# Patient Record
Sex: FEMALE | Race: WHITE | Employment: OTHER | URBAN - METROPOLITAN AREA
[De-identification: names, ages, dates, MRNs, and addresses within clinical notes are randomized per-mention and may not be internally consistent; named-entity substitution may affect disease eponyms.]

---

## 2017-01-06 RX ORDER — FLUTICASONE PROPIONATE 50 MCG
1 SPRAY, SUSPENSION (ML) NASAL DAILY
Qty: 3 BOTTLE | Refills: 3 | Status: SHIPPED | OUTPATIENT
Start: 2017-01-06 | End: 2017-12-17 | Stop reason: SDUPTHER

## 2017-02-13 ENCOUNTER — OFFICE VISIT (OUTPATIENT)
Dept: INTERNAL MEDICINE CLINIC | Age: 77
End: 2017-02-13

## 2017-02-13 VITALS
TEMPERATURE: 97.8 F | DIASTOLIC BLOOD PRESSURE: 78 MMHG | WEIGHT: 222 LBS | BODY MASS INDEX: 40.6 KG/M2 | SYSTOLIC BLOOD PRESSURE: 130 MMHG | HEART RATE: 86 BPM

## 2017-02-13 DIAGNOSIS — N30.01 ACUTE CYSTITIS WITH HEMATURIA: Primary | ICD-10-CM

## 2017-02-13 DIAGNOSIS — J00 ACUTE NASOPHARYNGITIS: ICD-10-CM

## 2017-02-13 DIAGNOSIS — R30.0 DYSURIA: ICD-10-CM

## 2017-02-13 LAB
BILIRUBIN, POC: ABNORMAL
BLOOD URINE, POC: ABNORMAL
CLARITY, POC: ABNORMAL
COLOR, POC: ABNORMAL
GLUCOSE URINE, POC: ABNORMAL
KETONES, POC: ABNORMAL
LEUKOCYTE EST, POC: ABNORMAL
NITRITE, POC: ABNORMAL
PH, POC: 6
PROTEIN, POC: ABNORMAL
SPECIFIC GRAVITY, POC: 1.02
UROBILINOGEN, POC: 0.2

## 2017-02-13 PROCEDURE — 99213 OFFICE O/P EST LOW 20 MIN: CPT | Performed by: NURSE PRACTITIONER

## 2017-02-13 PROCEDURE — 81002 URINALYSIS NONAUTO W/O SCOPE: CPT | Performed by: NURSE PRACTITIONER

## 2017-02-13 PROCEDURE — G8484 FLU IMMUNIZE NO ADMIN: HCPCS | Performed by: NURSE PRACTITIONER

## 2017-02-13 PROCEDURE — 3017F COLORECTAL CA SCREEN DOC REV: CPT | Performed by: NURSE PRACTITIONER

## 2017-02-13 PROCEDURE — 3014F SCREEN MAMMO DOC REV: CPT | Performed by: NURSE PRACTITIONER

## 2017-02-13 PROCEDURE — 1123F ACP DISCUSS/DSCN MKR DOCD: CPT | Performed by: NURSE PRACTITIONER

## 2017-02-13 PROCEDURE — 1036F TOBACCO NON-USER: CPT | Performed by: NURSE PRACTITIONER

## 2017-02-13 PROCEDURE — 4040F PNEUMOC VAC/ADMIN/RCVD: CPT | Performed by: NURSE PRACTITIONER

## 2017-02-13 PROCEDURE — 1090F PRES/ABSN URINE INCON ASSESS: CPT | Performed by: NURSE PRACTITIONER

## 2017-02-13 PROCEDURE — G8417 CALC BMI ABV UP PARAM F/U: HCPCS | Performed by: NURSE PRACTITIONER

## 2017-02-13 PROCEDURE — G8427 DOCREV CUR MEDS BY ELIG CLIN: HCPCS | Performed by: NURSE PRACTITIONER

## 2017-02-13 PROCEDURE — G8399 PT W/DXA RESULTS DOCUMENT: HCPCS | Performed by: NURSE PRACTITIONER

## 2017-02-13 RX ORDER — CIPROFLOXACIN 500 MG/1
500 TABLET, FILM COATED ORAL 2 TIMES DAILY
Qty: 10 TABLET | Refills: 0 | Status: SHIPPED | OUTPATIENT
Start: 2017-02-13 | End: 2017-02-18

## 2017-02-13 ASSESSMENT — ENCOUNTER SYMPTOMS
NAUSEA: 0
SINUS PRESSURE: 1
DIARRHEA: 0
CONSTIPATION: 0
TROUBLE SWALLOWING: 0
SORE THROAT: 0
EYE ITCHING: 0
RECTAL PAIN: 0
WHEEZING: 0
PHOTOPHOBIA: 0
CHEST TIGHTNESS: 0
FACIAL SWELLING: 0
CHOKING: 0
EYE PAIN: 0
ABDOMINAL DISTENTION: 0
ABDOMINAL PAIN: 0
ALLERGIC/IMMUNOLOGIC NEGATIVE: 1
VOMITING: 0
RHINORRHEA: 1
COUGH: 1
STRIDOR: 0
ANAL BLEEDING: 0
EYE REDNESS: 0
BLOOD IN STOOL: 0
VOICE CHANGE: 0
EYE DISCHARGE: 0
SHORTNESS OF BREATH: 1

## 2017-02-15 LAB
ORGANISM: ABNORMAL
URINE CULTURE, ROUTINE: ABNORMAL

## 2017-03-02 PROBLEM — R07.9 CHEST PAIN: Status: ACTIVE | Noted: 2017-03-02

## 2017-03-03 ENCOUNTER — TELEPHONE (OUTPATIENT)
Dept: ORTHOPEDIC SURGERY | Age: 77
End: 2017-03-03

## 2017-03-04 ENCOUNTER — CARE COORDINATION (OUTPATIENT)
Dept: CASE MANAGEMENT | Age: 77
End: 2017-03-04

## 2017-03-07 ENCOUNTER — CARE COORDINATION (OUTPATIENT)
Dept: CASE MANAGEMENT | Age: 77
End: 2017-03-07

## 2017-03-09 ENCOUNTER — CARE COORDINATOR VISIT (OUTPATIENT)
Dept: INTERNAL MEDICINE CLINIC | Age: 77
End: 2017-03-09

## 2017-03-09 ENCOUNTER — OFFICE VISIT (OUTPATIENT)
Dept: INTERNAL MEDICINE CLINIC | Age: 77
End: 2017-03-09

## 2017-03-09 VITALS
SYSTOLIC BLOOD PRESSURE: 136 MMHG | BODY MASS INDEX: 39.86 KG/M2 | WEIGHT: 216.6 LBS | HEART RATE: 72 BPM | HEIGHT: 62 IN | DIASTOLIC BLOOD PRESSURE: 76 MMHG

## 2017-03-09 DIAGNOSIS — J44.9 COPD, SEVERE (HCC): ICD-10-CM

## 2017-03-09 DIAGNOSIS — M19.012 ARTHRITIS OF LEFT SHOULDER REGION: ICD-10-CM

## 2017-03-09 DIAGNOSIS — R07.9 CHEST PAIN, UNSPECIFIED TYPE: ICD-10-CM

## 2017-03-09 DIAGNOSIS — I10 ESSENTIAL HYPERTENSION: Chronic | ICD-10-CM

## 2017-03-09 DIAGNOSIS — Z09 HOSPITAL DISCHARGE FOLLOW-UP: Primary | ICD-10-CM

## 2017-03-09 RX ORDER — TRAMADOL HYDROCHLORIDE 50 MG/1
50 TABLET ORAL EVERY 6 HOURS PRN
Qty: 120 TABLET | Refills: 0 | Status: SHIPPED | OUTPATIENT
Start: 2017-03-09 | End: 2017-05-09 | Stop reason: SDUPTHER

## 2017-03-09 RX ORDER — CELECOXIB 200 MG/1
200 CAPSULE ORAL DAILY
Qty: 90 CAPSULE | Refills: 3 | Status: ON HOLD | OUTPATIENT
Start: 2017-03-09 | End: 2017-12-29 | Stop reason: HOSPADM

## 2017-04-01 PROCEDURE — 99496 TRANSJ CARE MGMT HIGH F2F 7D: CPT | Performed by: NURSE PRACTITIONER

## 2017-04-13 ENCOUNTER — CARE COORDINATION (OUTPATIENT)
Dept: INTERNAL MEDICINE CLINIC | Age: 77
End: 2017-04-13

## 2017-05-09 ENCOUNTER — CARE COORDINATOR VISIT (OUTPATIENT)
Dept: INTERNAL MEDICINE CLINIC | Age: 77
End: 2017-05-09

## 2017-05-09 ENCOUNTER — OFFICE VISIT (OUTPATIENT)
Dept: INTERNAL MEDICINE CLINIC | Age: 77
End: 2017-05-09

## 2017-05-09 VITALS
HEART RATE: 80 BPM | SYSTOLIC BLOOD PRESSURE: 160 MMHG | BODY MASS INDEX: 39.32 KG/M2 | WEIGHT: 215 LBS | DIASTOLIC BLOOD PRESSURE: 80 MMHG

## 2017-05-09 DIAGNOSIS — M19.012 ARTHRITIS OF LEFT SHOULDER REGION: ICD-10-CM

## 2017-05-09 DIAGNOSIS — R73.9 HYPERGLYCEMIA: Primary | ICD-10-CM

## 2017-05-09 PROCEDURE — 3014F SCREEN MAMMO DOC REV: CPT | Performed by: INTERNAL MEDICINE

## 2017-05-09 PROCEDURE — 99214 OFFICE O/P EST MOD 30 MIN: CPT | Performed by: INTERNAL MEDICINE

## 2017-05-09 PROCEDURE — G8427 DOCREV CUR MEDS BY ELIG CLIN: HCPCS | Performed by: INTERNAL MEDICINE

## 2017-05-09 PROCEDURE — G8399 PT W/DXA RESULTS DOCUMENT: HCPCS | Performed by: INTERNAL MEDICINE

## 2017-05-09 PROCEDURE — 1123F ACP DISCUSS/DSCN MKR DOCD: CPT | Performed by: INTERNAL MEDICINE

## 2017-05-09 PROCEDURE — 1036F TOBACCO NON-USER: CPT | Performed by: INTERNAL MEDICINE

## 2017-05-09 PROCEDURE — G8417 CALC BMI ABV UP PARAM F/U: HCPCS | Performed by: INTERNAL MEDICINE

## 2017-05-09 PROCEDURE — 1090F PRES/ABSN URINE INCON ASSESS: CPT | Performed by: INTERNAL MEDICINE

## 2017-05-09 PROCEDURE — 3017F COLORECTAL CA SCREEN DOC REV: CPT | Performed by: INTERNAL MEDICINE

## 2017-05-09 PROCEDURE — 4040F PNEUMOC VAC/ADMIN/RCVD: CPT | Performed by: INTERNAL MEDICINE

## 2017-05-09 RX ORDER — ALBUTEROL SULFATE 90 UG/1
2 AEROSOL, METERED RESPIRATORY (INHALATION) EVERY 6 HOURS PRN
Qty: 1 INHALER | Refills: 3 | Status: SHIPPED | OUTPATIENT
Start: 2017-05-09 | End: 2017-07-18 | Stop reason: SDUPTHER

## 2017-05-09 RX ORDER — TRAMADOL HYDROCHLORIDE 50 MG/1
50 TABLET ORAL EVERY 6 HOURS PRN
Qty: 120 TABLET | Refills: 0 | Status: SHIPPED | OUTPATIENT
Start: 2017-05-09 | End: 2017-07-25 | Stop reason: SDUPTHER

## 2017-05-09 ASSESSMENT — PATIENT HEALTH QUESTIONNAIRE - PHQ9
SUM OF ALL RESPONSES TO PHQ QUESTIONS 1-9: 1
SUM OF ALL RESPONSES TO PHQ9 QUESTIONS 1 & 2: 1
2. FEELING DOWN, DEPRESSED OR HOPELESS: 0
1. LITTLE INTEREST OR PLEASURE IN DOING THINGS: 1

## 2017-05-10 LAB
ESTIMATED AVERAGE GLUCOSE: 105.4 MG/DL
HBA1C MFR BLD: 5.3 %

## 2017-06-07 ENCOUNTER — CARE COORDINATION (OUTPATIENT)
Dept: INTERNAL MEDICINE CLINIC | Age: 77
End: 2017-06-07

## 2017-07-12 ENCOUNTER — CARE COORDINATION (OUTPATIENT)
Dept: INTERNAL MEDICINE CLINIC | Age: 77
End: 2017-07-12

## 2017-07-18 ENCOUNTER — OFFICE VISIT (OUTPATIENT)
Dept: PULMONOLOGY | Age: 77
End: 2017-07-18

## 2017-07-18 VITALS
WEIGHT: 217 LBS | DIASTOLIC BLOOD PRESSURE: 86 MMHG | SYSTOLIC BLOOD PRESSURE: 129 MMHG | HEART RATE: 82 BPM | BODY MASS INDEX: 39.69 KG/M2

## 2017-07-18 DIAGNOSIS — G47.33 OSA ON CPAP: Chronic | ICD-10-CM

## 2017-07-18 DIAGNOSIS — J96.11 CHRONIC RESPIRATORY FAILURE WITH HYPOXIA (HCC): ICD-10-CM

## 2017-07-18 DIAGNOSIS — Z99.89 OSA ON CPAP: Chronic | ICD-10-CM

## 2017-07-18 DIAGNOSIS — I10 ESSENTIAL HYPERTENSION: Chronic | ICD-10-CM

## 2017-07-18 DIAGNOSIS — J44.9 COPD, SEVERE (HCC): Primary | ICD-10-CM

## 2017-07-18 PROCEDURE — 3014F SCREEN MAMMO DOC REV: CPT | Performed by: INTERNAL MEDICINE

## 2017-07-18 PROCEDURE — 99214 OFFICE O/P EST MOD 30 MIN: CPT | Performed by: INTERNAL MEDICINE

## 2017-07-18 PROCEDURE — 3023F SPIROM DOC REV: CPT | Performed by: INTERNAL MEDICINE

## 2017-07-18 PROCEDURE — 4040F PNEUMOC VAC/ADMIN/RCVD: CPT | Performed by: INTERNAL MEDICINE

## 2017-07-18 PROCEDURE — 1090F PRES/ABSN URINE INCON ASSESS: CPT | Performed by: INTERNAL MEDICINE

## 2017-07-18 PROCEDURE — 1036F TOBACCO NON-USER: CPT | Performed by: INTERNAL MEDICINE

## 2017-07-18 PROCEDURE — 3017F COLORECTAL CA SCREEN DOC REV: CPT | Performed by: INTERNAL MEDICINE

## 2017-07-18 PROCEDURE — G8427 DOCREV CUR MEDS BY ELIG CLIN: HCPCS | Performed by: INTERNAL MEDICINE

## 2017-07-18 PROCEDURE — 1123F ACP DISCUSS/DSCN MKR DOCD: CPT | Performed by: INTERNAL MEDICINE

## 2017-07-18 PROCEDURE — G8926 SPIRO NO PERF OR DOC: HCPCS | Performed by: INTERNAL MEDICINE

## 2017-07-18 PROCEDURE — G8399 PT W/DXA RESULTS DOCUMENT: HCPCS | Performed by: INTERNAL MEDICINE

## 2017-07-18 PROCEDURE — G8417 CALC BMI ABV UP PARAM F/U: HCPCS | Performed by: INTERNAL MEDICINE

## 2017-07-18 RX ORDER — ALBUTEROL SULFATE 90 UG/1
2 AEROSOL, METERED RESPIRATORY (INHALATION) EVERY 6 HOURS PRN
Qty: 1 INHALER | Refills: 3 | Status: SHIPPED | OUTPATIENT
Start: 2017-07-18

## 2017-07-25 ENCOUNTER — TELEPHONE (OUTPATIENT)
Dept: RHEUMATOLOGY | Age: 77
End: 2017-07-25

## 2017-07-25 DIAGNOSIS — M19.012 ARTHRITIS OF LEFT SHOULDER REGION: ICD-10-CM

## 2017-07-25 RX ORDER — TRAMADOL HYDROCHLORIDE 50 MG/1
50 TABLET ORAL EVERY 6 HOURS PRN
Qty: 120 TABLET | Refills: 0 | Status: SHIPPED | OUTPATIENT
Start: 2017-07-25 | End: 2017-09-27 | Stop reason: SDUPTHER

## 2017-08-02 ENCOUNTER — OFFICE VISIT (OUTPATIENT)
Dept: INTERNAL MEDICINE CLINIC | Age: 77
End: 2017-08-02

## 2017-08-02 VITALS
BODY MASS INDEX: 39.93 KG/M2 | WEIGHT: 217 LBS | HEIGHT: 62 IN | HEART RATE: 72 BPM | SYSTOLIC BLOOD PRESSURE: 160 MMHG | DIASTOLIC BLOOD PRESSURE: 80 MMHG

## 2017-08-02 DIAGNOSIS — B37.0 THRUSH, ORAL: Primary | ICD-10-CM

## 2017-08-02 PROCEDURE — G8428 CUR MEDS NOT DOCUMENT: HCPCS | Performed by: INTERNAL MEDICINE

## 2017-08-02 PROCEDURE — 1036F TOBACCO NON-USER: CPT | Performed by: INTERNAL MEDICINE

## 2017-08-02 PROCEDURE — 1123F ACP DISCUSS/DSCN MKR DOCD: CPT | Performed by: INTERNAL MEDICINE

## 2017-08-02 PROCEDURE — G8417 CALC BMI ABV UP PARAM F/U: HCPCS | Performed by: INTERNAL MEDICINE

## 2017-08-02 PROCEDURE — 4040F PNEUMOC VAC/ADMIN/RCVD: CPT | Performed by: INTERNAL MEDICINE

## 2017-08-02 PROCEDURE — 3017F COLORECTAL CA SCREEN DOC REV: CPT | Performed by: INTERNAL MEDICINE

## 2017-08-02 PROCEDURE — G8399 PT W/DXA RESULTS DOCUMENT: HCPCS | Performed by: INTERNAL MEDICINE

## 2017-08-02 PROCEDURE — 1090F PRES/ABSN URINE INCON ASSESS: CPT | Performed by: INTERNAL MEDICINE

## 2017-08-02 PROCEDURE — 99213 OFFICE O/P EST LOW 20 MIN: CPT | Performed by: INTERNAL MEDICINE

## 2017-08-24 ENCOUNTER — TELEPHONE (OUTPATIENT)
Dept: INTERNAL MEDICINE CLINIC | Age: 77
End: 2017-08-24

## 2017-08-24 DIAGNOSIS — D64.9 ANEMIA, UNSPECIFIED TYPE: ICD-10-CM

## 2017-08-24 DIAGNOSIS — E03.4 HYPOTHYROIDISM DUE TO ACQUIRED ATROPHY OF THYROID: Primary | ICD-10-CM

## 2017-08-24 DIAGNOSIS — R73.9 HYPERGLYCEMIA: ICD-10-CM

## 2017-08-25 LAB
IRON SATURATION: 24 % (ref 15–50)
IRON: 83 UG/DL (ref 37–145)
TOTAL IRON BINDING CAPACITY: 353 UG/DL (ref 260–445)
TSH REFLEX FT4: 1.5 UIU/ML (ref 0.27–4.2)

## 2017-08-26 LAB
ESTIMATED AVERAGE GLUCOSE: 108.3 MG/DL
HBA1C MFR BLD: 5.4 %

## 2017-08-30 ENCOUNTER — CARE COORDINATION (OUTPATIENT)
Dept: INTERNAL MEDICINE CLINIC | Age: 77
End: 2017-08-30

## 2017-08-31 ENCOUNTER — CARE COORDINATION (OUTPATIENT)
Dept: INTERNAL MEDICINE CLINIC | Age: 77
End: 2017-08-31

## 2017-09-21 RX ORDER — AMLODIPINE BESYLATE 10 MG/1
TABLET ORAL
Qty: 90 TABLET | Refills: 3 | Status: ON HOLD | OUTPATIENT
Start: 2017-09-21 | End: 2017-12-29 | Stop reason: HOSPADM

## 2017-09-27 ENCOUNTER — OFFICE VISIT (OUTPATIENT)
Dept: INTERNAL MEDICINE CLINIC | Age: 77
End: 2017-09-27

## 2017-09-27 VITALS
HEART RATE: 72 BPM | SYSTOLIC BLOOD PRESSURE: 122 MMHG | DIASTOLIC BLOOD PRESSURE: 60 MMHG | BODY MASS INDEX: 39.32 KG/M2 | TEMPERATURE: 98.2 F | WEIGHT: 215 LBS

## 2017-09-27 DIAGNOSIS — R11.2 NON-INTRACTABLE VOMITING WITH NAUSEA, UNSPECIFIED VOMITING TYPE: Primary | ICD-10-CM

## 2017-09-27 DIAGNOSIS — R11.2 NON-INTRACTABLE VOMITING WITH NAUSEA, UNSPECIFIED VOMITING TYPE: ICD-10-CM

## 2017-09-27 DIAGNOSIS — M19.012 ARTHRITIS OF LEFT SHOULDER REGION: ICD-10-CM

## 2017-09-27 LAB
A/G RATIO: 1.7 (ref 1.1–2.2)
ALBUMIN SERPL-MCNC: 4.3 G/DL (ref 3.4–5)
ALP BLD-CCNC: 116 U/L (ref 40–129)
ALT SERPL-CCNC: 14 U/L (ref 10–40)
AMYLASE: 61 U/L (ref 25–115)
ANION GAP SERPL CALCULATED.3IONS-SCNC: 15 MMOL/L (ref 3–16)
AST SERPL-CCNC: 15 U/L (ref 15–37)
BASOPHILS ABSOLUTE: 0.1 K/UL (ref 0–0.2)
BASOPHILS RELATIVE PERCENT: 0.6 %
BILIRUB SERPL-MCNC: 0.4 MG/DL (ref 0–1)
BUN BLDV-MCNC: 15 MG/DL (ref 7–20)
CALCIUM SERPL-MCNC: 9.7 MG/DL (ref 8.3–10.6)
CHLORIDE BLD-SCNC: 103 MMOL/L (ref 99–110)
CO2: 26 MMOL/L (ref 21–32)
CREAT SERPL-MCNC: 0.6 MG/DL (ref 0.6–1.2)
EOSINOPHILS ABSOLUTE: 0.3 K/UL (ref 0–0.6)
EOSINOPHILS RELATIVE PERCENT: 3.3 %
GFR AFRICAN AMERICAN: >60
GFR NON-AFRICAN AMERICAN: >60
GLOBULIN: 2.6 G/DL
GLUCOSE BLD-MCNC: 102 MG/DL (ref 70–99)
HCT VFR BLD CALC: 45 % (ref 36–48)
HEMOGLOBIN: 15.1 G/DL (ref 12–16)
LYMPHOCYTES ABSOLUTE: 2.1 K/UL (ref 1–5.1)
LYMPHOCYTES RELATIVE PERCENT: 22 %
MCH RBC QN AUTO: 31.3 PG (ref 26–34)
MCHC RBC AUTO-ENTMCNC: 33.5 G/DL (ref 31–36)
MCV RBC AUTO: 93.3 FL (ref 80–100)
MONOCYTES ABSOLUTE: 0.5 K/UL (ref 0–1.3)
MONOCYTES RELATIVE PERCENT: 5.3 %
NEUTROPHILS ABSOLUTE: 6.6 K/UL (ref 1.7–7.7)
NEUTROPHILS RELATIVE PERCENT: 68.8 %
PDW BLD-RTO: 14.8 % (ref 12.4–15.4)
PLATELET # BLD: 240 K/UL (ref 135–450)
PMV BLD AUTO: 9.3 FL (ref 5–10.5)
POTASSIUM SERPL-SCNC: 4.9 MMOL/L (ref 3.5–5.1)
RBC # BLD: 4.82 M/UL (ref 4–5.2)
SODIUM BLD-SCNC: 144 MMOL/L (ref 136–145)
TOTAL PROTEIN: 6.9 G/DL (ref 6.4–8.2)
WBC # BLD: 9.5 K/UL (ref 4–11)

## 2017-09-27 PROCEDURE — G8417 CALC BMI ABV UP PARAM F/U: HCPCS | Performed by: INTERNAL MEDICINE

## 2017-09-27 PROCEDURE — G8399 PT W/DXA RESULTS DOCUMENT: HCPCS | Performed by: INTERNAL MEDICINE

## 2017-09-27 PROCEDURE — 1036F TOBACCO NON-USER: CPT | Performed by: INTERNAL MEDICINE

## 2017-09-27 PROCEDURE — 99213 OFFICE O/P EST LOW 20 MIN: CPT | Performed by: INTERNAL MEDICINE

## 2017-09-27 PROCEDURE — 1090F PRES/ABSN URINE INCON ASSESS: CPT | Performed by: INTERNAL MEDICINE

## 2017-09-27 PROCEDURE — 1123F ACP DISCUSS/DSCN MKR DOCD: CPT | Performed by: INTERNAL MEDICINE

## 2017-09-27 PROCEDURE — G8427 DOCREV CUR MEDS BY ELIG CLIN: HCPCS | Performed by: INTERNAL MEDICINE

## 2017-09-27 PROCEDURE — 3017F COLORECTAL CA SCREEN DOC REV: CPT | Performed by: INTERNAL MEDICINE

## 2017-09-27 PROCEDURE — 4040F PNEUMOC VAC/ADMIN/RCVD: CPT | Performed by: INTERNAL MEDICINE

## 2017-09-27 RX ORDER — TRAMADOL HYDROCHLORIDE 50 MG/1
50 TABLET ORAL EVERY 6 HOURS PRN
Qty: 120 TABLET | Refills: 0 | Status: SHIPPED | OUTPATIENT
Start: 2017-09-27 | End: 2017-12-19 | Stop reason: SDUPTHER

## 2017-09-27 RX ORDER — ONDANSETRON 4 MG/1
4 TABLET, FILM COATED ORAL EVERY 8 HOURS PRN
Qty: 12 TABLET | Refills: 0 | Status: SHIPPED | OUTPATIENT
Start: 2017-09-27 | End: 2017-12-22 | Stop reason: SDUPTHER

## 2017-10-19 ENCOUNTER — CARE COORDINATION (OUTPATIENT)
Dept: INTERNAL MEDICINE CLINIC | Age: 77
End: 2017-10-19

## 2017-10-20 ENCOUNTER — CARE COORDINATION (OUTPATIENT)
Dept: INTERNAL MEDICINE CLINIC | Age: 77
End: 2017-10-20

## 2017-10-20 NOTE — CARE COORDINATION
12/22/2017 11:00 AM [facility] MAMMO  1 [facility] MAMMO [facility]   3/6/2018 10:45 AM Julio Longoria MD PULM & CC [facility]

## 2017-10-26 ENCOUNTER — CARE COORDINATION (OUTPATIENT)
Dept: INTERNAL MEDICINE CLINIC | Age: 77
End: 2017-10-26

## 2017-10-26 ENCOUNTER — OFFICE VISIT (OUTPATIENT)
Dept: INTERNAL MEDICINE CLINIC | Age: 77
End: 2017-10-26

## 2017-10-26 VITALS
BODY MASS INDEX: 40.42 KG/M2 | HEART RATE: 72 BPM | SYSTOLIC BLOOD PRESSURE: 120 MMHG | DIASTOLIC BLOOD PRESSURE: 70 MMHG | WEIGHT: 221 LBS

## 2017-10-26 DIAGNOSIS — M65.311 TRIGGER FINGER OF RIGHT THUMB: ICD-10-CM

## 2017-10-26 DIAGNOSIS — M19.90 ARTHRITIS: Primary | ICD-10-CM

## 2017-10-26 PROCEDURE — G8417 CALC BMI ABV UP PARAM F/U: HCPCS | Performed by: INTERNAL MEDICINE

## 2017-10-26 PROCEDURE — 1036F TOBACCO NON-USER: CPT | Performed by: INTERNAL MEDICINE

## 2017-10-26 PROCEDURE — 99213 OFFICE O/P EST LOW 20 MIN: CPT | Performed by: INTERNAL MEDICINE

## 2017-10-26 PROCEDURE — 3017F COLORECTAL CA SCREEN DOC REV: CPT | Performed by: INTERNAL MEDICINE

## 2017-10-26 PROCEDURE — 4040F PNEUMOC VAC/ADMIN/RCVD: CPT | Performed by: INTERNAL MEDICINE

## 2017-10-26 PROCEDURE — G8427 DOCREV CUR MEDS BY ELIG CLIN: HCPCS | Performed by: INTERNAL MEDICINE

## 2017-10-26 PROCEDURE — G8484 FLU IMMUNIZE NO ADMIN: HCPCS | Performed by: INTERNAL MEDICINE

## 2017-10-26 PROCEDURE — 1123F ACP DISCUSS/DSCN MKR DOCD: CPT | Performed by: INTERNAL MEDICINE

## 2017-10-26 PROCEDURE — G8399 PT W/DXA RESULTS DOCUMENT: HCPCS | Performed by: INTERNAL MEDICINE

## 2017-10-26 PROCEDURE — 1090F PRES/ABSN URINE INCON ASSESS: CPT | Performed by: INTERNAL MEDICINE

## 2017-10-26 NOTE — PROGRESS NOTES
The patient is here for an acute appointment    She comes in with pain in her hands    She has symptoms of a trigger finger right thumb    She also has diffuse arthralgias in the DIP and PIP joints associated with Heberden's and Parish's nodes    There was mention of some concern regarding rheumatoid arthritis however after discussion she felt comfortable with the diagnosis of osteoarthritis given the classical findings    She is having some diffuse abdominal pain. She reduced her dose of Celebrex because of the pain. She is uncertain whether it may represent diverticular disease gastritis or ulcers.     Physical examination  Trigger finger present  Heberden's and Parish's nodes  Abdomen no guarding or rebound tenderness bowel sounds are normal  She is not tender in the left lower quadrant  There is no specific tenderness in the epigastric region    Impression and plan  Surgical referral for trigger finger  She may have acetaminophen in addition to the tramadol and Celebrex she is taking for pain  Certainly is possible that abdominal pain may be related to Celebrex but no symptoms of gastrointestinal bleeding, she does not have a surgical abdomen and symptoms and physical findings are not consistent with acute diverticulitis

## 2017-11-08 ENCOUNTER — HOSPITAL ENCOUNTER (OUTPATIENT)
Dept: ENDOSCOPY | Age: 77
Discharge: HOME | End: 2017-11-08
Attending: INTERNAL MEDICINE | Admitting: INTERNAL MEDICINE

## 2017-11-08 LAB
GLUCOSE BLD-MCNC: 91 MG/DL (ref 70–99)
GLUCOSE BLD-MCNC: 93 MG/DL (ref 70–99)
PERFORMED ON: NORMAL
PERFORMED ON: NORMAL

## 2017-11-08 RX ORDER — SODIUM CHLORIDE 0.9 % (FLUSH) 0.9 %
10 SYRINGE (ML) INJECTION EVERY 12 HOURS SCHEDULED
Status: CANCELLED | OUTPATIENT
Start: 2017-11-08

## 2017-11-08 RX ORDER — SODIUM CHLORIDE 9 MG/ML
INJECTION, SOLUTION INTRAVENOUS CONTINUOUS
Status: CANCELLED | OUTPATIENT
Start: 2017-11-08

## 2017-11-08 RX ORDER — SODIUM CHLORIDE 0.9 % (FLUSH) 0.9 %
10 SYRINGE (ML) INJECTION PRN
Status: CANCELLED | OUTPATIENT
Start: 2017-11-08

## 2017-11-08 ASSESSMENT — COPD QUESTIONNAIRES: CAT_SEVERITY: SEVERE

## 2017-11-08 ASSESSMENT — ENCOUNTER SYMPTOMS: SHORTNESS OF BREATH: 1

## 2017-11-08 NOTE — ANESTHESIA POST-OP
Anesthesia Post-op Note    Patient: Antonella Terrazas  MRN: [id number]  YOB: 1940  Date of evaluation: 11/8/2017  Time:  1:25 PM     Procedure(s) Performed:     Last Vitals: There were no vitals taken for this visit. Marielle Phase I:      Marielle Phase II:      Anesthesia Post Evaluation    Final anesthesia type: TIVA  Patient location during evaluation: PACU  Patient participation: complete - patient participated  Level of consciousness: awake and alert  Airway patency: patent  Nausea & Vomiting: no vomiting  Complications: no  Cardiovascular status: hemodynamically stable  Respiratory status: acceptable  Hydration status: euvolemic  Comments:   Patient vomited during colonoscopy bilious fluid. Patient appeared to protect her airway. No signs or symptoms of aspiration. Patient observed in recovery room. Patient is a former nurse and verbalizes understanding to report to ER in the unlikely case she develops wheezing or SOB. She agrees with plan.  Lungs CTA b/l VSS        Emil Arechiga MD  1:25 PM

## 2017-11-08 NOTE — ANESTHESIA PRE-OP
[facility] Anesthesiology  Pre-Anesthesia Evaluation/Consultation       Name:  Mala Mullins                                         Age:  78 y.o. MRN:    [id number]           Procedure (Scheduled): COLONOSCOPY  Surgeon:     Dr. Jonathan Heart MD     Allergies   Allergen Reactions    Adhesive Tape      Blisters takes skin off    Amoxicillin-Pot Clavulanate Hives     Augmentin    Nickel Rash     Patient Active Problem List   Diagnosis    Hypoxemia    COPD (chronic obstructive pulmonary disease)    Pulmonary hypertension    DVT (deep venous thrombosis) (HCC)    Pulmonary embolism (HCC)    Chronic respiratory failure (HCC)    HTN (hypertension)    EKATERINA on CPAP    Abnormal echocardiogram    SOB (shortness of breath)    COPD, severe ([address]    Chest pain     Past Medical History:   Diagnosis Date    Arthritis     osteoarthritis    Cancer ([address]     basal cell on nose 2008    COPD (chronic obstructive pulmonary disease) (HCC)     chronic bronchitis    Diabetes mellitus ([address]     chromium    GERD (gastroesophageal reflux disease)     5/2015 patient states resolved    Hypertension     Impaired glucose tolerance     Kidney stone     several kidney stones    Pneumonia     Pulmonary emboli (HCC)     Sleep apnea      Past Surgical History:   Procedure Laterality Date    ABDOMEN SURGERY      CHOLECYSTECTOMY      COLONOSCOPY      polyps removed    EYE SURGERY      sanju cataracts removed    HYSTERECTOMY      JOINT REPLACEMENT  08/2016    RIGHT    SKIN BIOPSY       Social History   Substance Use Topics    Smoking status: Former Smoker     Years: 30.00     Types: Cigarettes     Quit date: 1/1/1988    Smokeless tobacco: Never Used      Comment: quit date approximate    Alcohol use Yes      Comment: less than once a week       Prior to Admission medications    Medication Sig Start Date End Date Taking?  Authorizing Provider   ondansetron (ZOFRAN) 4 MG tablet Take 1 tablet by mouth every 8 hours as needed for Nausea or Vomiting 9/27/17   Bishop Closs, MD   traMADol Carletha Tina) 50 MG tablet Take 1 tablet by mouth every 6 hours as needed for Pain 9/27/17   Bishop Closs, MD   amLODIPine (NORVASC) 10 MG tablet TAKE 1 TABLET BY MOUTH EVERY DAY 9/21/17   Bishop Closs, MD   Roflumilast (DALIRESP) 500 MCG tablet Take 500 mcg by mouth daily 7/18/17   Phi Waddell MD   tiotropium [facility]) 18 MCG inhalation capsule Inhale 1 capsule into the lungs daily 7/18/17   Phi Waddell MD   fluticasone-salmeterol (ADVAIR DISKUS) 250-50 MCG/DOSE AEPB Inhale 1 puff into the lungs every 12 hours 7/18/17   Phi Waddell MD   albuterol sulfate HFA (PROAIR HFA) 108 (90 Base) MCG/ACT inhaler Inhale 2 puffs into the lungs every 6 hours as needed for Wheezing 7/18/17   Phi Waddell MD   celecoxib (CELEBREX) 200 MG capsule Take 1 capsule by mouth daily  Patient taking differently: Take 200 mg by mouth every other day  3/9/17   Jw Reese CNP   vitamin D (CHOLECALCIFEROL) 1000 UNITS TABS tablet Take 1,000 Units by mouth daily    Historical Provider, MD   magnesium oxide (MAG-OX) 400 MG tablet Take 400 mg by mouth daily    Historical Provider, MD   Chromium 1 MG CAPS Take by mouth    Historical Provider, MD   polyethylene glycol (MIRALAX) PACK packet Take 17 g by mouth daily    Historical Provider, MD   guaiFENesin (MUCINEX) 600 MG extended release tablet Take 600 mg by mouth 2 times daily    Historical Provider, MD   fluticasone (FLONASE) 50 MCG/ACT nasal spray 1 spray by Nasal route daily 1/6/17   Bishop Closs, MD   albuterol sulfate HFA (PROVENTIL HFA) 108 (90 BASE) MCG/ACT inhaler Inhale 2 puffs into the lungs 4 times daily 11/28/16   Bishop Closs, MD   valsartan (DIOVAN) 80 MG tablet TAKE 1 TABLET BY MOUTH EVERY NIGHT 10/10/16   Bishop Closs, MD   gentamicin (GARAMYCIN) 0.1 % cream Apply topically 3 times daily.  6/9/16   Bishop Closs, MD   OXYGEN Inhale into the lungs 2 l Inhale 2 puffs into the lungs 4 times daily 3 Inhaler 3    valsartan (DIOVAN) 80 MG tablet TAKE 1 TABLET BY MOUTH EVERY NIGHT 90 tablet 3    gentamicin (GARAMYCIN) 0.1 % cream Apply topically 3 times daily. 3 Tube 5    OXYGEN Inhale into the lungs 2 l oxygen at night      Glucosamine-Chondroitin (GLUCOSAMINE CHONDR COMPLEX PO) Take 2 tablets by mouth daily       Glucose Blood (BLOOD GLUCOSE TEST STRIPS) STRP Test qd      DX  250.00  DM 50 strip 11    aspirin EC 81 MG EC tablet Twice a week. 30 tablet 3    senna (SENOKOT) 8.6 MG tablet Take 1 tablet by mouth daily as needed. No current facility-administered medications for this encounter. Vital Signs  (Current) There were no vitals filed for this visit.   (for past 48 hrs)  No Data Recorded  (last three values)   BP Readings from Last 3 Encounters:   10/26/17 120/70   09/27/17 122/60   08/02/17 (!) 160/80       CBC  Lab Results   Component Value Date    WBC 9.5 09/27/2017    RBC 4.82 09/27/2017    HGB 15.1 09/27/2017    HCT 45.0 09/27/2017    MCV 93.3 09/27/2017    RDW 14.8 09/27/2017     09/27/2017       CMP    Lab Results   Component Value Date     09/27/2017    K 4.9 09/27/2017     09/27/2017    CO2 26 09/27/2017    BUN 15 09/27/2017    CREATININE 0.6 09/27/2017    GFRAA >60 09/27/2017    GFRAA >60 10/08/2012    AGRATIO 1.7 09/27/2017    LABGLOM >60 09/27/2017    GLUCOSE 102 09/27/2017    PROT 6.9 09/27/2017    PROT 7.3 10/08/2012    CALCIUM 9.7 09/27/2017    BILITOT 0.4 09/27/2017    ALKPHOS 116 09/27/2017    AST 15 09/27/2017    ALT 14 09/27/2017       BMP    Lab Results   Component Value Date     09/27/2017    K 4.9 09/27/2017     09/27/2017    CO2 26 09/27/2017    BUN 15 09/27/2017    CREATININE 0.6 09/27/2017    CALCIUM 9.7 09/27/2017    GFRAA >60 09/27/2017    GFRAA >60 10/08/2012    LABGLOM >60 09/27/2017    GLUCOSE 102 09/27/2017       [place]   Lab Results   Component Value Date    PROTIME 11.4 03/03/2017 INR 1.01 03/03/2017    APTT 26.1 03/03/2017       HCG (If Applicable) No results found for: PREGTESTUR, PREGSERUM, HCG, HCGQUANT     ABGs  Lab Results   Component Value Date    PHART 7.447 10/16/2013    PO2ART 67.5 10/16/2013    MJH9XDA 48.5 10/16/2013    RHG1QMY 32.7 10/16/2013    BEART 7.4 10/16/2013    G8TQYKKQ 94.0 10/16/2013        Type & Screen (If Applicable)  No results found for: LABABO, LABRH      POCGlucose  No results for input(s): GLUCOSE in the last 72 hours. NPO Status  > 8 hours except finished prep 7 am                     BMI  There is no height or weight on file to calculate BMI. Estimated body mass index is 40.42 kg/m² as calculated from the following:    Height as of 10/26/17: 5' 2\" (1.575 m). Weight as of 10/26/17: 221 lb (100.2 kg). Additional Testing (Echo, Stress, ECG, PFTs, etc)        Anesthesia Evaluation  Patient summary reviewed and Nursing notes reviewed no history of anesthetic complications:   Airway: Mallampati: II  TM distance: >3 FB   Neck ROM: full  Mouth opening: > = 3 FB Dental: normal exam         Pulmonary:   (+) COPD: severe,  shortness of breath:  sleep apnea: on CPAP,  decreased breath sounds,      (-) asthma, recent URI, rhonchi and wheezes                        ROS comment: 2 L oxygen with CPAP at night  Former smoker  H/o PE  Used 3 inhalers 7 am   Cardiovascular:  Exercise tolerance: good (>4 METS),   (+) hypertension:,     (-) pacemaker, valvular problems/murmurs, past MI, CAD, CABG/stent, dysrhythmias,  angina,  CHF, orthopnea, PND and  BEAVERS      Rhythm: regular  Rate: normal                 ROS comment: Normal left ventricle size and systolic function with an estimated ejection   fraction of 60%.  No regional wall motion abnormalities are seen.  [place] is mild concentric left ventricular hypertrophy.   E/e\"= 23.   Mild mitral regurgitation.   The left atrium is dilated.  [place] is an aneurysmal interatrial septum with no evidence of shunting.   The

## 2017-11-09 RX ORDER — VALSARTAN 80 MG/1
TABLET ORAL
Qty: 90 TABLET | Refills: 3 | Status: ON HOLD | OUTPATIENT
Start: 2017-11-09 | End: 2017-12-29 | Stop reason: HOSPADM

## 2017-12-15 ENCOUNTER — CARE COORDINATION (OUTPATIENT)
Dept: CARE COORDINATION | Age: 77
End: 2017-12-15

## 2017-12-18 RX ORDER — FLUTICASONE PROPIONATE 50 MCG
SPRAY, SUSPENSION (ML) NASAL
Qty: 48 G | Refills: 3 | Status: ON HOLD | OUTPATIENT
Start: 2017-12-18 | End: 2018-11-23 | Stop reason: HOSPADM

## 2017-12-19 DIAGNOSIS — M19.012 ARTHRITIS OF LEFT SHOULDER REGION: ICD-10-CM

## 2017-12-19 RX ORDER — TRAMADOL HYDROCHLORIDE 50 MG/1
50 TABLET ORAL EVERY 6 HOURS PRN
Qty: 120 TABLET | Refills: 3 | Status: SHIPPED | OUTPATIENT
Start: 2017-12-19 | End: 2018-09-04 | Stop reason: SDUPTHER

## 2017-12-22 ENCOUNTER — TELEPHONE (OUTPATIENT)
Dept: RHEUMATOLOGY | Age: 77
End: 2017-12-22

## 2017-12-22 RX ORDER — ONDANSETRON 4 MG/1
4 TABLET, FILM COATED ORAL EVERY 8 HOURS PRN
Qty: 12 TABLET | Refills: 0 | Status: SHIPPED | OUTPATIENT
Start: 2017-12-22

## 2017-12-23 PROBLEM — K56.609 SBO (SMALL BOWEL OBSTRUCTION) (HCC): Status: ACTIVE | Noted: 2017-12-23

## 2017-12-24 PROBLEM — I63.9 ACUTE CVA (CEREBROVASCULAR ACCIDENT) (HCC): Status: ACTIVE | Noted: 2017-12-24

## 2018-01-11 ENCOUNTER — CARE COORDINATOR VISIT (OUTPATIENT)
Dept: CARE COORDINATION | Age: 78
End: 2018-01-11

## 2018-01-11 ENCOUNTER — OFFICE VISIT (OUTPATIENT)
Dept: INTERNAL MEDICINE CLINIC | Age: 78
End: 2018-01-11

## 2018-01-11 VITALS
HEART RATE: 84 BPM | BODY MASS INDEX: 40.6 KG/M2 | DIASTOLIC BLOOD PRESSURE: 76 MMHG | WEIGHT: 222 LBS | SYSTOLIC BLOOD PRESSURE: 160 MMHG

## 2018-01-11 DIAGNOSIS — J44.9 CHRONIC OBSTRUCTIVE PULMONARY DISEASE, UNSPECIFIED COPD TYPE (HCC): ICD-10-CM

## 2018-01-11 DIAGNOSIS — I10 BENIGN ESSENTIAL HYPERTENSION: ICD-10-CM

## 2018-01-11 DIAGNOSIS — I63.521 CEREBROVASCULAR ACCIDENT (CVA) DUE TO OCCLUSION OF RIGHT ANTERIOR CEREBRAL ARTERY (HCC): ICD-10-CM

## 2018-01-11 DIAGNOSIS — Z09 HOSPITAL DISCHARGE FOLLOW-UP: ICD-10-CM

## 2018-01-11 DIAGNOSIS — K56.609 SMALL BOWEL OBSTRUCTION (HCC): Primary | ICD-10-CM

## 2018-01-11 PROCEDURE — 1111F DSCHRG MED/CURRENT MED MERGE: CPT | Performed by: INTERNAL MEDICINE

## 2018-01-11 PROCEDURE — 99495 TRANSJ CARE MGMT MOD F2F 14D: CPT | Performed by: INTERNAL MEDICINE

## 2018-01-11 RX ORDER — AMLODIPINE BESYLATE 10 MG/1
TABLET ORAL
Qty: 90 TABLET | Refills: 3 | Status: SHIPPED | OUTPATIENT
Start: 2018-01-11 | End: 2018-06-08

## 2018-01-11 RX ORDER — AMLODIPINE BESYLATE 10 MG/1
TABLET ORAL
Qty: 90 TABLET | Refills: 3 | Status: SHIPPED | OUTPATIENT
Start: 2018-01-11 | End: 2018-01-11 | Stop reason: SDUPTHER

## 2018-01-11 RX ORDER — CLOPIDOGREL BISULFATE 75 MG/1
75 TABLET ORAL DAILY
Qty: 90 TABLET | Refills: 3 | Status: SHIPPED | OUTPATIENT
Start: 2018-01-11 | End: 2018-10-05 | Stop reason: SDUPTHER

## 2018-01-11 RX ORDER — CLOPIDOGREL BISULFATE 75 MG/1
75 TABLET ORAL DAILY
Qty: 90 TABLET | Refills: 3 | Status: SHIPPED | OUTPATIENT
Start: 2018-01-11 | End: 2018-01-11 | Stop reason: SDUPTHER

## 2018-01-11 RX ORDER — ATORVASTATIN CALCIUM 40 MG/1
40 TABLET, FILM COATED ORAL DAILY
Qty: 90 TABLET | Refills: 3 | Status: SHIPPED | OUTPATIENT
Start: 2018-01-11 | End: 2018-10-05 | Stop reason: SDUPTHER

## 2018-01-11 NOTE — PROGRESS NOTES
Post-Discharge Transitional Care Management Anmol Joshi   YOB: 1940    Date of Office Visit:  1/11/2018  Date of Hospital Admission: 12/23/17  Date of Hospital Discharge: 12/29/17  [facility] Risk Score [risk of hospital readmission >=10  medium risk (chance of readmission ~ 12%) >14  high risk (chance of readmission ~18%)]:Risk Score: 23.5    Care management risk score Rising risk (score 2-5) and Complex Care (Scores >=6): 5   The patient is here after a recent hospitalization for small bowel obstruction. She has had 2 prior abdominal surgeries. She underwent an extensive evaluation with a suspicion of adhesion causing her obstruction    She was treated with intravenous fluids and bowel rest.    Subsequently she developed some focal left-sided neurological symptoms. MRI showed evidence of a cerebral infarct and white matter disease. She had further evaluation including carotid studies showing no significant carotid stenosis, also had an echocardiogram showing no evidence of a PFO and no evidence of intracardiac thrombus    It was advised that she go on aspirin and Plavix. It looks like she was placed on a proton pump inhibitor for ulcer prophylaxis and wonders whether she needs to continue taking it    She needs refills on her amlodipine, this was stopped during the course of her hospitalization however her systolic blood pressure today is 160. She is also on valsartan this was also stopped will reintroduce medications as above.     The rest of her medications were reviewed and reconciled, advised to stop the Protonix, medications otherwise as listed reviewed with patient    Patient Active Problem List   Diagnosis    Hypoxemia    COPD (chronic obstructive pulmonary disease)    Pulmonary hypertension    DVT (deep venous thrombosis) (HCC)    Pulmonary embolism (HCC)    Chronic respiratory failure (HCC)    HTN (hypertension)    EKATERINA on CPAP    Abnormal echocardiogram every 48hrs             senna (SENOKOT) 8.6 MG tablet  Take 1 tablet by mouth daily as needed. tiotropium (SPIRIVA) 18 MCG inhalation capsule  Inhale 1 capsule into the lungs daily             traMADol (ULTRAM) 50 MG tablet  Take 1 tablet by mouth every 6 hours as needed for Pain .             vitamin D (CHOLECALCIFEROL) 1000 UNITS TABS tablet  Take 1,000 Units by mouth daily                   Medications marked \"taking\" at this time  Outpatient Prescriptions Marked as Taking for the 1/11/18 encounter (Office Visit) with Jozef Penn MD   Medication Sig Dispense Refill    atorvastatin (LIPITOR) 40 MG tablet Take 1 tablet by mouth daily 90 tablet 3    amLODIPine (NORVASC) 10 MG tablet TAKE 1 TABLET BY MOUTH EVERY DAY 90 tablet 3    clopidogrel (PLAVIX) 75 MG tablet Take 1 tablet by mouth daily 90 tablet 3        Medications patient taking as of now reconciled against medications ordered at time of hospital discharge  Completed as above    Vitals:    01/11/18 1512 01/11/18 1545   BP: (!) 160/76 (!) 160/76   Cuff Size:  Large Adult   Pulse: 84    Weight: 222 lb (100.7 kg)      Body mass index is 40.6 kg/m². Wt Readings from Last 3 Encounters:   01/11/18 222 lb (100.7 kg)   12/29/17 216 lb 0.8 oz (98 kg)   10/26/17 221 lb (100.2 kg)     BP Readings from Last 3 Encounters:   01/11/18 (!) 160/76   12/29/17 133/66   10/26/17 120/70        Inpatient course: Discharge summary reviewed- see chart. Chief Complaint   Patient presents with    Follow-Up from Hospital     partial bowel obstruction , stroke        HPI see above  Review of Systems    Non face to face  following discharge, date last encounter closed (first attempt may have been earlier): 1/2/2018 11:12 AM 1/2/2018 11:12 AM    Call initiated 2 business days of discharge:  Yes            Physical Exam        Assessment/Plan:  Vikas Russell was seen today for follow-up from hospital.    Diagnoses and all orders for this visit:    Small bowel

## 2018-03-06 ENCOUNTER — CARE COORDINATION (OUTPATIENT)
Dept: CARE COORDINATION | Age: 78
End: 2018-03-06

## 2018-03-12 ENCOUNTER — OFFICE VISIT (OUTPATIENT)
Dept: PULMONOLOGY | Age: 78
End: 2018-03-12

## 2018-03-12 VITALS — DIASTOLIC BLOOD PRESSURE: 77 MMHG | SYSTOLIC BLOOD PRESSURE: 140 MMHG | HEART RATE: 105 BPM

## 2018-03-12 DIAGNOSIS — G47.33 OSA ON CPAP: Primary | Chronic | ICD-10-CM

## 2018-03-12 DIAGNOSIS — J44.9 COPD, SEVERE (HCC): ICD-10-CM

## 2018-03-12 DIAGNOSIS — Z99.89 OSA ON CPAP: Primary | Chronic | ICD-10-CM

## 2018-03-12 DIAGNOSIS — I27.20 PULMONARY HYPERTENSION (HCC): Chronic | ICD-10-CM

## 2018-03-12 DIAGNOSIS — J96.11 CHRONIC RESPIRATORY FAILURE WITH HYPOXIA (HCC): ICD-10-CM

## 2018-03-12 PROCEDURE — G8598 ASA/ANTIPLAT THER USED: HCPCS | Performed by: INTERNAL MEDICINE

## 2018-03-12 PROCEDURE — 3023F SPIROM DOC REV: CPT | Performed by: INTERNAL MEDICINE

## 2018-03-12 PROCEDURE — 99214 OFFICE O/P EST MOD 30 MIN: CPT | Performed by: INTERNAL MEDICINE

## 2018-03-12 PROCEDURE — 4040F PNEUMOC VAC/ADMIN/RCVD: CPT | Performed by: INTERNAL MEDICINE

## 2018-03-12 PROCEDURE — 1036F TOBACCO NON-USER: CPT | Performed by: INTERNAL MEDICINE

## 2018-03-12 PROCEDURE — 3017F COLORECTAL CA SCREEN DOC REV: CPT | Performed by: INTERNAL MEDICINE

## 2018-03-12 PROCEDURE — G8399 PT W/DXA RESULTS DOCUMENT: HCPCS | Performed by: INTERNAL MEDICINE

## 2018-03-12 PROCEDURE — G8417 CALC BMI ABV UP PARAM F/U: HCPCS | Performed by: INTERNAL MEDICINE

## 2018-03-12 PROCEDURE — G8926 SPIRO NO PERF OR DOC: HCPCS | Performed by: INTERNAL MEDICINE

## 2018-03-12 PROCEDURE — G8482 FLU IMMUNIZE ORDER/ADMIN: HCPCS | Performed by: INTERNAL MEDICINE

## 2018-03-12 PROCEDURE — G8427 DOCREV CUR MEDS BY ELIG CLIN: HCPCS | Performed by: INTERNAL MEDICINE

## 2018-03-12 PROCEDURE — 1123F ACP DISCUSS/DSCN MKR DOCD: CPT | Performed by: INTERNAL MEDICINE

## 2018-03-12 PROCEDURE — 1090F PRES/ABSN URINE INCON ASSESS: CPT | Performed by: INTERNAL MEDICINE

## 2018-03-12 NOTE — PROGRESS NOTES
[facility]  Progress Note  MD Eugene Carpenter   YOB: 1940    Date of Visit:  3/12/2018    Assessment/Plan:  1. COPD, severe ([address]  PFT 2016:  Spirometry reveals normal FVC and FEV1. FEV1-FVC ratio is reduced. There is  no significant change after inhaled bronchodilators. Lung volumes reveal  normal total lung capacity and vital capacity. Residual volume is increased  at 129% predicted. Diffusion capacity is decreased at 74% predicted. In  comparison to a study from October 2013, FVC has improved by 18%, FEV1 has  improved by 53%, and diffusion capacity has improved by 22%.       IMPRESSION:  Mild obstructive lung disease with air trapping. There has been  significant improvement in air flow and lung volumes since the preceding  study. I have independently reviewed pulmonary function testing. There has been improvement in PFT from the previous study. Advair  Spiriva  Albuterol      2. Chronic respiratory failure with hypoxia (HCC)  O2 sats are acceptable on supplemental O2. The patient benefits from the use of supplemental O2.      3. EKATERINA on CPAP  Benefits from CPAP  Wears with O2 nightly    4. Essential hypertension  BP is ok  Stable. FOLLOW UP: 6 months      Chief Complaint   Patient presents with    6 Month Follow-Up     was in the hospital back in December and told she had a stroke       HPI  The patient present for follow up of severe COPD with chronic hypoxemic respiratory failure. She also has EKATERINA and is on nightly CPAP. She was hospitalized with a bowel obstruction. This resolved spontaneously. During her hospitalization, she had a stroke. This also seemed to resolve spontaneously. She is back to her baseline. . No Chest pain, Nausea or vomiting reported. This has stopped.       Review of Systems  As documented in HPI     Physical Exam:  Well developed, well nourished  Alert and oriented  Sclera is

## 2018-04-01 PROBLEM — R11.2 NAUSEA AND VOMITING: Status: ACTIVE | Noted: 2018-04-01

## 2018-04-01 PROBLEM — E78.5 HYPERLIPIDEMIA: Status: ACTIVE | Noted: 2018-04-01

## 2018-04-01 PROBLEM — E66.01 MORBID OBESITY DUE TO EXCESS CALORIES (HCC): Status: ACTIVE | Noted: 2018-04-01

## 2018-04-04 PROBLEM — E87.0 HYPERNATREMIA: Status: ACTIVE | Noted: 2018-04-04

## 2018-04-04 PROBLEM — N17.9 AKI (ACUTE KIDNEY INJURY) (HCC): Status: ACTIVE | Noted: 2018-04-04

## 2018-04-05 PROBLEM — N17.9 AKI (ACUTE KIDNEY INJURY) (HCC): Status: RESOLVED | Noted: 2018-04-04 | Resolved: 2018-04-05

## 2018-04-05 PROBLEM — E87.0 HYPERNATREMIA: Status: RESOLVED | Noted: 2018-04-04 | Resolved: 2018-04-05

## 2018-04-05 PROBLEM — R11.2 NAUSEA AND VOMITING: Status: RESOLVED | Noted: 2018-04-01 | Resolved: 2018-04-05

## 2018-04-05 PROBLEM — K56.609 SBO (SMALL BOWEL OBSTRUCTION) (HCC): Status: RESOLVED | Noted: 2017-12-23 | Resolved: 2018-04-05

## 2018-04-06 ENCOUNTER — CARE COORDINATION (OUTPATIENT)
Dept: CASE MANAGEMENT | Age: 78
End: 2018-04-06

## 2018-04-06 DIAGNOSIS — I27.20 PULMONARY HYPERTENSION (HCC): Primary | Chronic | ICD-10-CM

## 2018-04-06 PROCEDURE — 1111F DSCHRG MED/CURRENT MED MERGE: CPT | Performed by: INTERNAL MEDICINE

## 2018-04-16 ENCOUNTER — CARE COORDINATION (OUTPATIENT)
Dept: CASE MANAGEMENT | Age: 78
End: 2018-04-16

## 2018-04-18 ENCOUNTER — OFFICE VISIT (OUTPATIENT)
Dept: SURGERY | Age: 78
End: 2018-04-18

## 2018-04-18 ENCOUNTER — TELEPHONE (OUTPATIENT)
Dept: INTERNAL MEDICINE CLINIC | Age: 78
End: 2018-04-18

## 2018-04-18 VITALS — BODY MASS INDEX: 39.69 KG/M2 | WEIGHT: 217 LBS | DIASTOLIC BLOOD PRESSURE: 84 MMHG | SYSTOLIC BLOOD PRESSURE: 144 MMHG

## 2018-04-18 DIAGNOSIS — K42.9 UMBILICAL HERNIA WITHOUT OBSTRUCTION AND WITHOUT GANGRENE: Primary | ICD-10-CM

## 2018-04-18 PROCEDURE — G8399 PT W/DXA RESULTS DOCUMENT: HCPCS | Performed by: SURGERY

## 2018-04-18 PROCEDURE — 4040F PNEUMOC VAC/ADMIN/RCVD: CPT | Performed by: SURGERY

## 2018-04-18 PROCEDURE — G8427 DOCREV CUR MEDS BY ELIG CLIN: HCPCS | Performed by: SURGERY

## 2018-04-18 PROCEDURE — 1111F DSCHRG MED/CURRENT MED MERGE: CPT | Performed by: SURGERY

## 2018-04-18 PROCEDURE — 1090F PRES/ABSN URINE INCON ASSESS: CPT | Performed by: SURGERY

## 2018-04-18 PROCEDURE — 1123F ACP DISCUSS/DSCN MKR DOCD: CPT | Performed by: SURGERY

## 2018-04-18 PROCEDURE — 3017F COLORECTAL CA SCREEN DOC REV: CPT | Performed by: SURGERY

## 2018-04-18 PROCEDURE — 1036F TOBACCO NON-USER: CPT | Performed by: SURGERY

## 2018-04-18 PROCEDURE — 99213 OFFICE O/P EST LOW 20 MIN: CPT | Performed by: SURGERY

## 2018-04-18 PROCEDURE — G8417 CALC BMI ABV UP PARAM F/U: HCPCS | Performed by: SURGERY

## 2018-04-18 PROCEDURE — G8598 ASA/ANTIPLAT THER USED: HCPCS | Performed by: SURGERY

## 2018-04-18 ASSESSMENT — ENCOUNTER SYMPTOMS
ALLERGIC/IMMUNOLOGIC NEGATIVE: 1
EYES NEGATIVE: 1
RESPIRATORY NEGATIVE: 1
ABDOMINAL PAIN: 1

## 2018-04-24 DIAGNOSIS — E11.9 TYPE 2 DIABETES MELLITUS WITHOUT COMPLICATION, WITHOUT LONG-TERM CURRENT USE OF INSULIN (HCC): Primary | ICD-10-CM

## 2018-05-01 LAB
ESTIMATED AVERAGE GLUCOSE: 125.5 MG/DL
HBA1C MFR BLD: 6 %

## 2018-05-02 ENCOUNTER — OFFICE VISIT (OUTPATIENT)
Dept: SURGERY | Age: 78
End: 2018-05-02

## 2018-05-02 VITALS — DIASTOLIC BLOOD PRESSURE: 84 MMHG | SYSTOLIC BLOOD PRESSURE: 132 MMHG

## 2018-05-02 DIAGNOSIS — K42.9 UMBILICAL HERNIA WITHOUT OBSTRUCTION AND WITHOUT GANGRENE: Primary | ICD-10-CM

## 2018-05-02 PROCEDURE — 99999 PR OFFICE/OUTPT VISIT,PROCEDURE ONLY: CPT | Performed by: SURGERY

## 2018-05-07 ENCOUNTER — HOSPITAL ENCOUNTER (OUTPATIENT)
Dept: SURGERY | Age: 78
Discharge: HOME | End: 2018-05-07
Attending: SURGERY | Admitting: SURGERY

## 2018-05-07 VITALS
HEIGHT: 62 IN | RESPIRATION RATE: 12 BRPM | DIASTOLIC BLOOD PRESSURE: 68 MMHG | BODY MASS INDEX: 40.35 KG/M2 | TEMPERATURE: 97.3 F | HEART RATE: 75 BPM | OXYGEN SATURATION: 93 % | WEIGHT: 219.3 LBS | SYSTOLIC BLOOD PRESSURE: 117 MMHG

## 2018-05-07 DIAGNOSIS — K42.9 UMBILICAL HERNIA WITHOUT OBSTRUCTION AND WITHOUT GANGRENE: Primary | ICD-10-CM

## 2018-05-07 LAB
ANION GAP SERPL CALCULATED.3IONS-SCNC: 14 MMOL/L (ref 3–16)
BUN BLDV-MCNC: 21 MG/DL (ref 7–20)
CALCIUM SERPL-MCNC: 8.8 MG/DL (ref 8.3–10.6)
CHLORIDE BLD-SCNC: 107 MMOL/L (ref 99–110)
CO2: 22 MMOL/L (ref 21–32)
CREAT SERPL-MCNC: 0.6 MG/DL (ref 0.6–1.2)
GFR AFRICAN AMERICAN: >60
GFR NON-AFRICAN AMERICAN: >60
GLUCOSE BLD-MCNC: 105 MG/DL (ref 70–99)
HCT VFR BLD CALC: 41.6 % (ref 36–48)
HEMOGLOBIN: 14 G/DL (ref 12–16)
MCH RBC QN AUTO: 30.6 PG (ref 26–34)
MCHC RBC AUTO-ENTMCNC: 33.6 G/DL (ref 31–36)
MCV RBC AUTO: 91 FL (ref 80–100)
PDW BLD-RTO: 14.5 % (ref 12.4–15.4)
PLATELET # BLD: 225 K/UL (ref 135–450)
PMV BLD AUTO: 8 FL (ref 5–10.5)
POTASSIUM SERPL-SCNC: 4.1 MMOL/L (ref 3.5–5.1)
RBC # BLD: 4.57 M/UL (ref 4–5.2)
SODIUM BLD-SCNC: 143 MMOL/L (ref 136–145)
WBC # BLD: 7.5 K/UL (ref 4–11)

## 2018-05-07 PROCEDURE — 49585 REPAIR UMBILICAL HERN,5+Y/O,REDUC: CPT | Performed by: SURGERY

## 2018-05-07 RX ORDER — HYDROMORPHONE HCL 110MG/55ML
0.25 PATIENT CONTROLLED ANALGESIA SYRINGE INTRAVENOUS EVERY 5 MIN PRN
Status: DISCONTINUED | OUTPATIENT
Start: 2018-05-07 | End: 2018-05-08 | Stop reason: HOSPADM

## 2018-05-07 RX ORDER — HYDROCODONE BITARTRATE AND ACETAMINOPHEN 5; 325 MG/1; MG/1
1-2 TABLET ORAL EVERY 4 HOURS PRN
Qty: 30 TABLET | Refills: 0 | Status: SHIPPED | OUTPATIENT
Start: 2018-05-07 | End: 2018-05-10

## 2018-05-07 RX ORDER — DIPHENHYDRAMINE HYDROCHLORIDE 50 MG/ML
12.5 INJECTION INTRAMUSCULAR; INTRAVENOUS
Status: ACTIVE | OUTPATIENT
Start: 2018-05-07 | End: 2018-05-07

## 2018-05-07 RX ORDER — SODIUM CHLORIDE 0.9 % (FLUSH) 0.9 %
10 SYRINGE (ML) INJECTION EVERY 12 HOURS SCHEDULED
Status: DISCONTINUED | OUTPATIENT
Start: 2018-05-07 | End: 2018-05-08 | Stop reason: HOSPADM

## 2018-05-07 RX ORDER — SODIUM CHLORIDE 9 MG/ML
INJECTION, SOLUTION INTRAVENOUS CONTINUOUS
Status: DISCONTINUED | OUTPATIENT
Start: 2018-05-07 | End: 2018-05-08 | Stop reason: HOSPADM

## 2018-05-07 RX ORDER — CEFAZOLIN SODIUM 2 G/100ML
2 INJECTION, SOLUTION INTRAVENOUS ONCE
Status: COMPLETED | OUTPATIENT
Start: 2018-05-07 | End: 2018-05-07

## 2018-05-07 RX ORDER — HYDROMORPHONE HCL 110MG/55ML
0.5 PATIENT CONTROLLED ANALGESIA SYRINGE INTRAVENOUS EVERY 5 MIN PRN
Status: DISCONTINUED | OUTPATIENT
Start: 2018-05-07 | End: 2018-05-08 | Stop reason: HOSPADM

## 2018-05-07 RX ORDER — LIDOCAINE HYDROCHLORIDE 10 MG/ML
1 INJECTION, SOLUTION EPIDURAL; INFILTRATION; INTRACAUDAL; PERINEURAL
Status: ACTIVE | OUTPATIENT
Start: 2018-05-07 | End: 2018-05-07

## 2018-05-07 RX ORDER — HYDROCODONE BITARTRATE AND ACETAMINOPHEN 5; 325 MG/1; MG/1
1 TABLET ORAL PRN
Status: ACTIVE | OUTPATIENT
Start: 2018-05-07 | End: 2018-05-07

## 2018-05-07 RX ORDER — MEPERIDINE HYDROCHLORIDE 25 MG/ML
12.5 INJECTION INTRAMUSCULAR; INTRAVENOUS; SUBCUTANEOUS EVERY 5 MIN PRN
Status: DISCONTINUED | OUTPATIENT
Start: 2018-05-07 | End: 2018-05-08 | Stop reason: HOSPADM

## 2018-05-07 RX ORDER — SODIUM CHLORIDE 0.9 % (FLUSH) 0.9 %
10 SYRINGE (ML) INJECTION PRN
Status: DISCONTINUED | OUTPATIENT
Start: 2018-05-07 | End: 2018-05-08 | Stop reason: HOSPADM

## 2018-05-07 RX ORDER — FENTANYL CITRATE 50 UG/ML
50 INJECTION, SOLUTION INTRAMUSCULAR; INTRAVENOUS EVERY 5 MIN PRN
Status: DISCONTINUED | OUTPATIENT
Start: 2018-05-07 | End: 2018-05-08 | Stop reason: HOSPADM

## 2018-05-07 RX ORDER — PROMETHAZINE HYDROCHLORIDE 25 MG/ML
6.25 INJECTION, SOLUTION INTRAMUSCULAR; INTRAVENOUS EVERY 30 MIN PRN
Status: DISCONTINUED | OUTPATIENT
Start: 2018-05-07 | End: 2018-05-08 | Stop reason: HOSPADM

## 2018-05-07 RX ORDER — HYDROCODONE BITARTRATE AND ACETAMINOPHEN 5; 325 MG/1; MG/1
2 TABLET ORAL PRN
Status: ACTIVE | OUTPATIENT
Start: 2018-05-07 | End: 2018-05-07

## 2018-05-07 RX ORDER — FENTANYL CITRATE 50 UG/ML
25 INJECTION, SOLUTION INTRAMUSCULAR; INTRAVENOUS EVERY 5 MIN PRN
Status: DISCONTINUED | OUTPATIENT
Start: 2018-05-07 | End: 2018-05-08 | Stop reason: HOSPADM

## 2018-05-07 RX ADMIN — CEFAZOLIN SODIUM 2 G: 2 INJECTION, SOLUTION INTRAVENOUS at 10:00

## 2018-05-07 RX ADMIN — SODIUM CHLORIDE: 9 INJECTION, SOLUTION INTRAVENOUS at 08:22

## 2018-05-07 RX ADMIN — Medication 0.25 MG: at 11:40

## 2018-05-07 RX ADMIN — Medication 0.25 MG: at 11:26

## 2018-05-07 RX ADMIN — Medication 0.25 MG: at 12:06

## 2018-05-07 ASSESSMENT — COPD QUESTIONNAIRES: CAT_SEVERITY: SEVERE

## 2018-05-07 ASSESSMENT — PAIN SCALES - GENERAL
PAINLEVEL_OUTOF10: 6
PAINLEVEL_OUTOF10: 6
PAINLEVEL_OUTOF10: 0
PAINLEVEL_OUTOF10: 6

## 2018-05-07 ASSESSMENT — ENCOUNTER SYMPTOMS: SHORTNESS OF BREATH: 1

## 2018-05-07 ASSESSMENT — PAIN - FUNCTIONAL ASSESSMENT: PAIN_FUNCTIONAL_ASSESSMENT: 0-10

## 2018-05-11 ENCOUNTER — TELEPHONE (OUTPATIENT)
Dept: PULMONOLOGY | Age: 78
End: 2018-05-11

## 2018-05-12 PROBLEM — J96.20 ACUTE ON CHRONIC RESPIRATORY FAILURE (HCC): Status: ACTIVE | Noted: 2018-05-12

## 2018-05-15 ENCOUNTER — CARE COORDINATION (OUTPATIENT)
Dept: CASE MANAGEMENT | Age: 78
End: 2018-05-15

## 2018-05-15 ENCOUNTER — TELEPHONE (OUTPATIENT)
Dept: PHARMACY | Facility: CLINIC | Age: 78
End: 2018-05-15

## 2018-05-15 DIAGNOSIS — J96.20 ACUTE ON CHRONIC RESPIRATORY FAILURE, UNSPECIFIED WHETHER WITH HYPOXIA OR HYPERCAPNIA (HCC): Primary | ICD-10-CM

## 2018-05-15 PROCEDURE — 1111F DSCHRG MED/CURRENT MED MERGE: CPT | Performed by: INTERNAL MEDICINE

## 2018-05-15 RX ORDER — GUAIFENESIN 600 MG/1
600 TABLET, EXTENDED RELEASE ORAL 2 TIMES DAILY
COMMUNITY

## 2018-05-15 RX ORDER — LANSOPRAZOLE 30 MG/1
30 CAPSULE, DELAYED RELEASE ORAL DAILY PRN
COMMUNITY

## 2018-05-15 RX ORDER — MAGNESIUM 30 MG
30 TABLET ORAL DAILY
Status: ON HOLD | COMMUNITY
End: 2018-11-23 | Stop reason: HOSPADM

## 2018-05-23 ENCOUNTER — OFFICE VISIT (OUTPATIENT)
Dept: SURGERY | Age: 78
End: 2018-05-23

## 2018-05-23 ENCOUNTER — OFFICE VISIT (OUTPATIENT)
Dept: PULMONOLOGY | Age: 78
End: 2018-05-23

## 2018-05-23 VITALS — DIASTOLIC BLOOD PRESSURE: 80 MMHG | SYSTOLIC BLOOD PRESSURE: 122 MMHG

## 2018-05-23 VITALS
HEART RATE: 90 BPM | OXYGEN SATURATION: 93 % | DIASTOLIC BLOOD PRESSURE: 72 MMHG | SYSTOLIC BLOOD PRESSURE: 119 MMHG | HEIGHT: 62 IN | BODY MASS INDEX: 39.93 KG/M2 | WEIGHT: 217 LBS

## 2018-05-23 DIAGNOSIS — J98.11 ATELECTASIS: ICD-10-CM

## 2018-05-23 DIAGNOSIS — K42.9 UMBILICAL HERNIA WITHOUT OBSTRUCTION AND WITHOUT GANGRENE: Primary | ICD-10-CM

## 2018-05-23 DIAGNOSIS — J96.11 CHRONIC RESPIRATORY FAILURE WITH HYPOXIA (HCC): ICD-10-CM

## 2018-05-23 DIAGNOSIS — Z99.89 OSA ON CPAP: ICD-10-CM

## 2018-05-23 DIAGNOSIS — Z09 HOSPITAL DISCHARGE FOLLOW-UP: Primary | ICD-10-CM

## 2018-05-23 DIAGNOSIS — G47.33 OSA ON CPAP: ICD-10-CM

## 2018-05-23 DIAGNOSIS — J44.9 COPD, MILD (HCC): ICD-10-CM

## 2018-05-23 PROCEDURE — 4040F PNEUMOC VAC/ADMIN/RCVD: CPT | Performed by: NURSE PRACTITIONER

## 2018-05-23 PROCEDURE — 3017F COLORECTAL CA SCREEN DOC REV: CPT | Performed by: NURSE PRACTITIONER

## 2018-05-23 PROCEDURE — G8417 CALC BMI ABV UP PARAM F/U: HCPCS | Performed by: NURSE PRACTITIONER

## 2018-05-23 PROCEDURE — 1111F DSCHRG MED/CURRENT MED MERGE: CPT | Performed by: NURSE PRACTITIONER

## 2018-05-23 PROCEDURE — G8926 SPIRO NO PERF OR DOC: HCPCS | Performed by: NURSE PRACTITIONER

## 2018-05-23 PROCEDURE — 99024 POSTOP FOLLOW-UP VISIT: CPT | Performed by: SURGERY

## 2018-05-23 PROCEDURE — G8399 PT W/DXA RESULTS DOCUMENT: HCPCS | Performed by: NURSE PRACTITIONER

## 2018-05-23 PROCEDURE — 99214 OFFICE O/P EST MOD 30 MIN: CPT | Performed by: NURSE PRACTITIONER

## 2018-05-23 PROCEDURE — 1123F ACP DISCUSS/DSCN MKR DOCD: CPT | Performed by: NURSE PRACTITIONER

## 2018-05-23 PROCEDURE — G8598 ASA/ANTIPLAT THER USED: HCPCS | Performed by: NURSE PRACTITIONER

## 2018-05-23 PROCEDURE — 3023F SPIROM DOC REV: CPT | Performed by: NURSE PRACTITIONER

## 2018-05-23 PROCEDURE — 1090F PRES/ABSN URINE INCON ASSESS: CPT | Performed by: NURSE PRACTITIONER

## 2018-05-23 PROCEDURE — G8427 DOCREV CUR MEDS BY ELIG CLIN: HCPCS | Performed by: NURSE PRACTITIONER

## 2018-05-23 PROCEDURE — 1036F TOBACCO NON-USER: CPT | Performed by: NURSE PRACTITIONER

## 2018-05-23 RX ORDER — TIZANIDINE 2 MG/1
2 TABLET ORAL NIGHTLY PRN
Qty: 7 TABLET | Refills: 0 | Status: SHIPPED | OUTPATIENT
Start: 2018-05-23 | End: 2018-05-30

## 2018-05-23 ASSESSMENT — ENCOUNTER SYMPTOMS
SINUS PAIN: 0
CONSTIPATION: 1
DIARRHEA: 0
COLOR CHANGE: 0
COUGH: 0
ABDOMINAL PAIN: 0
SHORTNESS OF BREATH: 1

## 2018-05-30 ENCOUNTER — TELEPHONE (OUTPATIENT)
Dept: CASE MANAGEMENT | Age: 78
End: 2018-05-30

## 2018-05-31 ENCOUNTER — OFFICE VISIT (OUTPATIENT)
Dept: INTERNAL MEDICINE CLINIC | Age: 78
End: 2018-05-31

## 2018-05-31 VITALS
BODY MASS INDEX: 40.85 KG/M2 | HEART RATE: 96 BPM | WEIGHT: 222 LBS | DIASTOLIC BLOOD PRESSURE: 60 MMHG | HEIGHT: 62 IN | SYSTOLIC BLOOD PRESSURE: 128 MMHG

## 2018-05-31 DIAGNOSIS — R09.02 HYPOXIA: ICD-10-CM

## 2018-05-31 DIAGNOSIS — R60.0 LOCALIZED EDEMA: ICD-10-CM

## 2018-05-31 DIAGNOSIS — Z86.718 HISTORY OF DEEP VEIN THROMBOSIS (DVT) OF LOWER EXTREMITY: ICD-10-CM

## 2018-05-31 DIAGNOSIS — R60.0 LOCALIZED EDEMA: Primary | ICD-10-CM

## 2018-05-31 DIAGNOSIS — E66.01 MORBID OBESITY WITH BMI OF 40.0-44.9, ADULT (HCC): ICD-10-CM

## 2018-05-31 LAB — D DIMER: 262 NG/ML DDU (ref 0–229)

## 2018-05-31 PROCEDURE — 3017F COLORECTAL CA SCREEN DOC REV: CPT | Performed by: INTERNAL MEDICINE

## 2018-05-31 PROCEDURE — 4040F PNEUMOC VAC/ADMIN/RCVD: CPT | Performed by: INTERNAL MEDICINE

## 2018-05-31 PROCEDURE — G8427 DOCREV CUR MEDS BY ELIG CLIN: HCPCS | Performed by: INTERNAL MEDICINE

## 2018-05-31 PROCEDURE — 1090F PRES/ABSN URINE INCON ASSESS: CPT | Performed by: INTERNAL MEDICINE

## 2018-05-31 PROCEDURE — 99214 OFFICE O/P EST MOD 30 MIN: CPT | Performed by: INTERNAL MEDICINE

## 2018-05-31 PROCEDURE — 1111F DSCHRG MED/CURRENT MED MERGE: CPT | Performed by: INTERNAL MEDICINE

## 2018-05-31 PROCEDURE — 1123F ACP DISCUSS/DSCN MKR DOCD: CPT | Performed by: INTERNAL MEDICINE

## 2018-05-31 PROCEDURE — 1036F TOBACCO NON-USER: CPT | Performed by: INTERNAL MEDICINE

## 2018-05-31 PROCEDURE — G8598 ASA/ANTIPLAT THER USED: HCPCS | Performed by: INTERNAL MEDICINE

## 2018-05-31 PROCEDURE — G8399 PT W/DXA RESULTS DOCUMENT: HCPCS | Performed by: INTERNAL MEDICINE

## 2018-05-31 PROCEDURE — G8417 CALC BMI ABV UP PARAM F/U: HCPCS | Performed by: INTERNAL MEDICINE

## 2018-05-31 RX ORDER — TIZANIDINE 2 MG/1
2 TABLET ORAL EVERY 8 HOURS PRN
Qty: 30 TABLET | Refills: 1 | Status: ON HOLD | OUTPATIENT
Start: 2018-05-31 | End: 2018-11-23 | Stop reason: HOSPADM

## 2018-05-31 RX ORDER — FUROSEMIDE 20 MG/1
20 TABLET ORAL DAILY
Qty: 60 TABLET | Refills: 3 | Status: SHIPPED | OUTPATIENT
Start: 2018-05-31 | End: 2018-06-29 | Stop reason: SDUPTHER

## 2018-05-31 RX ORDER — TIZANIDINE 2 MG/1
2 TABLET ORAL EVERY 8 HOURS PRN
Qty: 30 TABLET | Refills: 1 | Status: SHIPPED | OUTPATIENT
Start: 2018-05-31 | End: 2018-05-31 | Stop reason: SDUPTHER

## 2018-06-05 ASSESSMENT — PATIENT HEALTH QUESTIONNAIRE - PHQ9
1. LITTLE INTEREST OR PLEASURE IN DOING THINGS: 0
2. FEELING DOWN, DEPRESSED OR HOPELESS: 0
SUM OF ALL RESPONSES TO PHQ9 QUESTIONS 1 & 2: 0
SUM OF ALL RESPONSES TO PHQ QUESTIONS 1-9: 0

## 2018-06-08 ENCOUNTER — OFFICE VISIT (OUTPATIENT)
Dept: INTERNAL MEDICINE CLINIC | Age: 78
End: 2018-06-08

## 2018-06-08 VITALS
SYSTOLIC BLOOD PRESSURE: 140 MMHG | HEART RATE: 88 BPM | WEIGHT: 220 LBS | DIASTOLIC BLOOD PRESSURE: 80 MMHG | BODY MASS INDEX: 40.24 KG/M2

## 2018-06-08 DIAGNOSIS — I10 ESSENTIAL HYPERTENSION: Chronic | ICD-10-CM

## 2018-06-08 DIAGNOSIS — R60.0 LOCALIZED EDEMA: Primary | ICD-10-CM

## 2018-06-08 LAB
A/G RATIO: 1.9 (ref 1.1–2.2)
ALBUMIN SERPL-MCNC: 4.5 G/DL (ref 3.4–5)
ALP BLD-CCNC: 118 U/L (ref 40–129)
ALT SERPL-CCNC: 12 U/L (ref 10–40)
ANION GAP SERPL CALCULATED.3IONS-SCNC: 18 MMOL/L (ref 3–16)
AST SERPL-CCNC: 12 U/L (ref 15–37)
BASOPHILS ABSOLUTE: 0 K/UL (ref 0–0.2)
BASOPHILS RELATIVE PERCENT: 0.5 %
BILIRUB SERPL-MCNC: 0.4 MG/DL (ref 0–1)
BUN BLDV-MCNC: 11 MG/DL (ref 7–20)
CALCIUM SERPL-MCNC: 9.4 MG/DL (ref 8.3–10.6)
CHLORIDE BLD-SCNC: 103 MMOL/L (ref 99–110)
CO2: 24 MMOL/L (ref 21–32)
CREAT SERPL-MCNC: 0.6 MG/DL (ref 0.6–1.2)
EOSINOPHILS ABSOLUTE: 0.3 K/UL (ref 0–0.6)
EOSINOPHILS RELATIVE PERCENT: 3.6 %
GFR AFRICAN AMERICAN: >60
GFR NON-AFRICAN AMERICAN: >60
GLOBULIN: 2.4 G/DL
GLUCOSE BLD-MCNC: 118 MG/DL (ref 70–99)
HCT VFR BLD CALC: 44.9 % (ref 36–48)
HEMOGLOBIN: 15.2 G/DL (ref 12–16)
LYMPHOCYTES ABSOLUTE: 1.5 K/UL (ref 1–5.1)
LYMPHOCYTES RELATIVE PERCENT: 16.4 %
MCH RBC QN AUTO: 30.9 PG (ref 26–34)
MCHC RBC AUTO-ENTMCNC: 33.7 G/DL (ref 31–36)
MCV RBC AUTO: 91.6 FL (ref 80–100)
MONOCYTES ABSOLUTE: 0.6 K/UL (ref 0–1.3)
MONOCYTES RELATIVE PERCENT: 6.2 %
NEUTROPHILS ABSOLUTE: 6.5 K/UL (ref 1.7–7.7)
NEUTROPHILS RELATIVE PERCENT: 73.3 %
PDW BLD-RTO: 14.6 % (ref 12.4–15.4)
PLATELET # BLD: 268 K/UL (ref 135–450)
PMV BLD AUTO: 8.7 FL (ref 5–10.5)
POTASSIUM SERPL-SCNC: 4.1 MMOL/L (ref 3.5–5.1)
RBC # BLD: 4.9 M/UL (ref 4–5.2)
SODIUM BLD-SCNC: 145 MMOL/L (ref 136–145)
TOTAL PROTEIN: 6.9 G/DL (ref 6.4–8.2)
TSH REFLEX FT4: 1.23 UIU/ML (ref 0.27–4.2)
WBC # BLD: 8.9 K/UL (ref 4–11)

## 2018-06-08 PROCEDURE — 1090F PRES/ABSN URINE INCON ASSESS: CPT | Performed by: INTERNAL MEDICINE

## 2018-06-08 PROCEDURE — 3017F COLORECTAL CA SCREEN DOC REV: CPT | Performed by: INTERNAL MEDICINE

## 2018-06-08 PROCEDURE — G8417 CALC BMI ABV UP PARAM F/U: HCPCS | Performed by: INTERNAL MEDICINE

## 2018-06-08 PROCEDURE — G8598 ASA/ANTIPLAT THER USED: HCPCS | Performed by: INTERNAL MEDICINE

## 2018-06-08 PROCEDURE — 1111F DSCHRG MED/CURRENT MED MERGE: CPT | Performed by: INTERNAL MEDICINE

## 2018-06-08 PROCEDURE — G8399 PT W/DXA RESULTS DOCUMENT: HCPCS | Performed by: INTERNAL MEDICINE

## 2018-06-08 PROCEDURE — G8427 DOCREV CUR MEDS BY ELIG CLIN: HCPCS | Performed by: INTERNAL MEDICINE

## 2018-06-08 PROCEDURE — 4040F PNEUMOC VAC/ADMIN/RCVD: CPT | Performed by: INTERNAL MEDICINE

## 2018-06-08 PROCEDURE — 99213 OFFICE O/P EST LOW 20 MIN: CPT | Performed by: INTERNAL MEDICINE

## 2018-06-08 PROCEDURE — 1123F ACP DISCUSS/DSCN MKR DOCD: CPT | Performed by: INTERNAL MEDICINE

## 2018-06-08 PROCEDURE — 1036F TOBACCO NON-USER: CPT | Performed by: INTERNAL MEDICINE

## 2018-06-08 RX ORDER — VALSARTAN 80 MG/1
80 TABLET ORAL DAILY
Qty: 30 TABLET | Refills: 3 | Status: SHIPPED | OUTPATIENT
Start: 2018-06-08 | End: 2018-06-08

## 2018-06-29 RX ORDER — FUROSEMIDE 20 MG/1
40 TABLET ORAL DAILY
Qty: 60 TABLET | Refills: 3 | Status: SHIPPED | OUTPATIENT
Start: 2018-06-29 | End: 2018-07-16 | Stop reason: SDUPTHER

## 2018-07-16 ENCOUNTER — PATIENT MESSAGE (OUTPATIENT)
Dept: INTERNAL MEDICINE CLINIC | Age: 78
End: 2018-07-16

## 2018-07-16 DIAGNOSIS — I10 ESSENTIAL HYPERTENSION: Chronic | ICD-10-CM

## 2018-07-17 RX ORDER — VALSARTAN 80 MG/1
80 TABLET ORAL DAILY
Qty: 90 TABLET | Refills: 3 | Status: SHIPPED | OUTPATIENT
Start: 2018-07-17 | End: 2018-08-20

## 2018-08-13 ENCOUNTER — TELEPHONE (OUTPATIENT)
Dept: CARDIOLOGY CLINIC | Age: 78
End: 2018-08-13

## 2018-08-14 ENCOUNTER — OFFICE VISIT (OUTPATIENT)
Dept: INTERNAL MEDICINE CLINIC | Age: 78
End: 2018-08-14

## 2018-08-14 VITALS
DIASTOLIC BLOOD PRESSURE: 86 MMHG | WEIGHT: 224 LBS | BODY MASS INDEX: 41.22 KG/M2 | HEART RATE: 88 BPM | HEIGHT: 62 IN | SYSTOLIC BLOOD PRESSURE: 132 MMHG

## 2018-08-14 DIAGNOSIS — R60.0 BILATERAL LOWER EXTREMITY EDEMA: ICD-10-CM

## 2018-08-14 DIAGNOSIS — R60.0 BILATERAL LOWER EXTREMITY EDEMA: Primary | ICD-10-CM

## 2018-08-14 LAB
A/G RATIO: 1.9 (ref 1.1–2.2)
ALBUMIN SERPL-MCNC: 4.4 G/DL (ref 3.4–5)
ALP BLD-CCNC: 122 U/L (ref 40–129)
ALT SERPL-CCNC: 14 U/L (ref 10–40)
ANION GAP SERPL CALCULATED.3IONS-SCNC: 15 MMOL/L (ref 3–16)
AST SERPL-CCNC: 16 U/L (ref 15–37)
BILIRUB SERPL-MCNC: 0.3 MG/DL (ref 0–1)
BUN BLDV-MCNC: 21 MG/DL (ref 7–20)
CALCIUM SERPL-MCNC: 9.5 MG/DL (ref 8.3–10.6)
CHLORIDE BLD-SCNC: 103 MMOL/L (ref 99–110)
CO2: 28 MMOL/L (ref 21–32)
CREAT SERPL-MCNC: 0.8 MG/DL (ref 0.6–1.2)
CREATININE URINE: 128.8 MG/DL (ref 28–259)
GFR AFRICAN AMERICAN: >60
GFR NON-AFRICAN AMERICAN: >60
GLOBULIN: 2.3 G/DL
GLUCOSE BLD-MCNC: 95 MG/DL (ref 70–99)
MICROALBUMIN UR-MCNC: <1.2 MG/DL
MICROALBUMIN/CREAT UR-RTO: NORMAL MG/G (ref 0–30)
POTASSIUM SERPL-SCNC: 4.3 MMOL/L (ref 3.5–5.1)
PROTEIN PROTEIN: 15 MG/DL
SODIUM BLD-SCNC: 146 MMOL/L (ref 136–145)
SODIUM URINE: 75 MMOL/L
TOTAL PROTEIN: 6.7 G/DL (ref 6.4–8.2)
TSH REFLEX: 2.74 UIU/ML (ref 0.27–4.2)

## 2018-08-14 PROCEDURE — 99213 OFFICE O/P EST LOW 20 MIN: CPT | Performed by: NURSE PRACTITIONER

## 2018-08-14 PROCEDURE — 1036F TOBACCO NON-USER: CPT | Performed by: NURSE PRACTITIONER

## 2018-08-14 PROCEDURE — G8598 ASA/ANTIPLAT THER USED: HCPCS | Performed by: NURSE PRACTITIONER

## 2018-08-14 PROCEDURE — 1123F ACP DISCUSS/DSCN MKR DOCD: CPT | Performed by: NURSE PRACTITIONER

## 2018-08-14 PROCEDURE — G8428 CUR MEDS NOT DOCUMENT: HCPCS | Performed by: NURSE PRACTITIONER

## 2018-08-14 PROCEDURE — 1101F PT FALLS ASSESS-DOCD LE1/YR: CPT | Performed by: NURSE PRACTITIONER

## 2018-08-14 PROCEDURE — 4040F PNEUMOC VAC/ADMIN/RCVD: CPT | Performed by: NURSE PRACTITIONER

## 2018-08-14 PROCEDURE — G8417 CALC BMI ABV UP PARAM F/U: HCPCS | Performed by: NURSE PRACTITIONER

## 2018-08-14 PROCEDURE — 1090F PRES/ABSN URINE INCON ASSESS: CPT | Performed by: NURSE PRACTITIONER

## 2018-08-14 PROCEDURE — G8399 PT W/DXA RESULTS DOCUMENT: HCPCS | Performed by: NURSE PRACTITIONER

## 2018-08-14 NOTE — PROGRESS NOTES
120 tablet 3    fluticasone (FLONASE) 50 MCG/ACT nasal spray INHALE 1 PUFF EACH NOSTRIL EVERY DAY 48 g 3    albuterol sulfate HFA (PROAIR HFA) 108 (90 Base) MCG/ACT inhaler Inhale 2 puffs into the lungs every 6 hours as needed for Wheezing 1 Inhaler 3    vitamin D (CHOLECALCIFEROL) 1000 UNITS TABS tablet Take 1,000 Units by mouth daily      Chromium 1 MG CAPS Take 1 capsule by mouth 2 times daily       gentamicin (GARAMYCIN) 0.1 % cream Apply topically 3 times daily. 3 Tube 5    OXYGEN Inhale into the lungs 2 l oxygen at night      Glucosamine-Chondroitin (GLUCOSAMINE CHONDR COMPLEX PO) Take 2 tablets by mouth daily       Glucose Blood (BLOOD GLUCOSE TEST STRIPS) STRP Test qd      DX  250.00  DM 50 strip 11    senna (SENOKOT) 8.6 MG tablet Take 1 tablet by mouth daily as needed for Constipation        No current facility-administered medications for this visit. Review of Systems   Cardiovascular: Positive for leg swelling. OBJECTIVE:  Physical Exam   Constitutional: She appears well-developed and well-nourished. No distress. HENT:   Head: Normocephalic and atraumatic. Eyes: Conjunctivae are normal. No scleral icterus. Cardiovascular: Normal rate and regular rhythm. Pulmonary/Chest: Effort normal and breath sounds normal. No respiratory distress. Musculoskeletal:        Right lower leg: She exhibits tenderness and edema. Left lower leg: She exhibits tenderness and edema. Mild bilateral lower extremity edema with 1+ pitting   Skin: Skin is warm and dry. She is not diaphoretic. No erythema.       /86   Pulse 88   Ht 5' 2\" (1.575 m)   Wt 224 lb (101.6 kg)   BMI 40.97 kg/m²      PHQ Scores 6/5/2018 5/9/2017 1/28/2016   PHQ2 Score 0 1 0   PHQ9 Score 0 1 0     Interpretation of Total Score Depression Severity: 1-4 = Minimal depression, 5-9 = Mild depression, 10-14 = Moderate depression, 15-19 = Moderately severe depression, 20-27 = Severe depression        ASSESSMENT/PLAN:  Juan was seen today for leg swelling. Diagnoses and all orders for this visit:    Bilateral lower extremity edema  -     Compression Stockings MISC; by Does not apply route  -     Comprehensive Metabolic Panel  -     PROTEIN, URINE, RANDOM  -     SODIUM, URINE, RANDOM  -     MICROALBUMIN / CREATININE URINE RATIO  -     TSH with Reflex; Future      - She remains very bothered by her lower extremity edema which on examination appears mild.   - She has developed some varicosities which also bother her  - Recommended compression  - Check for reversible causes      AILIN Estes - CNP

## 2018-08-20 ENCOUNTER — PATIENT MESSAGE (OUTPATIENT)
Dept: INTERNAL MEDICINE CLINIC | Age: 78
End: 2018-08-20

## 2018-08-20 ENCOUNTER — TELEPHONE (OUTPATIENT)
Dept: INTERNAL MEDICINE CLINIC | Age: 78
End: 2018-08-20

## 2018-08-20 RX ORDER — LOSARTAN POTASSIUM 50 MG/1
50 TABLET ORAL DAILY
Qty: 90 TABLET | Refills: 3 | Status: SHIPPED | OUTPATIENT
Start: 2018-08-20

## 2018-08-20 NOTE — TELEPHONE ENCOUNTER
From: Amelia Alexander  To: Marcy Foley MD  Sent: 8/20/2018 3:21 PM EDT  Subject: Prescription Question    Got a recall medication letter from [facility] today valsartan 80 mgm qd recalled said to stop taking immediately and call PCP to order another BP med. I'm not able to take amolodipine due to feet and leg edema    Please call script in 90 days [facility] on [address] asap of med for hypertension     Tried to call your office phone overloaded couldn't get through    [facility] [place] is on Valsartan 80 mgm twice a day and needs a 90 day supply of what you put him on called into [facility] on [address].

## 2018-08-22 ENCOUNTER — PATIENT MESSAGE (OUTPATIENT)
Dept: INTERNAL MEDICINE CLINIC | Age: 78
End: 2018-08-22

## 2018-08-22 DIAGNOSIS — N06.9 ISOLATED PROTEINURIA WITH MORPHOLOGIC LESION: Primary | ICD-10-CM

## 2018-08-23 NOTE — TELEPHONE ENCOUNTER
From: Belen Vazquez  To: Gomez Bowie MD  Sent: 8/22/2018 2:10 PM EDT  Subject: Test Results Question    NP [facility] said I should have a repeat around August 30 of blood and urine had small amount protein in test he ran last week. Please advise I see Dr Wyatt Patella August 30 for first appointment.

## 2018-08-27 LAB
BILIRUBIN URINE: NEGATIVE
BLOOD, URINE: NEGATIVE
CLARITY: CLEAR
COLOR: YELLOW
GLUCOSE URINE: NEGATIVE MG/DL
KETONES, URINE: NEGATIVE MG/DL
LEUKOCYTE ESTERASE, URINE: NEGATIVE
MICROSCOPIC EXAMINATION: NORMAL
NITRITE, URINE: NEGATIVE
PH UA: 6
PROTEIN UA: NEGATIVE MG/DL
SPECIFIC GRAVITY UA: 1.02
URINE TYPE: NORMAL
UROBILINOGEN, URINE: 0.2 E.U./DL

## 2018-08-30 ENCOUNTER — OFFICE VISIT (OUTPATIENT)
Dept: CARDIOLOGY CLINIC | Age: 78
End: 2018-08-30

## 2018-08-30 VITALS
RESPIRATION RATE: 16 BRPM | BODY MASS INDEX: 41.48 KG/M2 | DIASTOLIC BLOOD PRESSURE: 64 MMHG | OXYGEN SATURATION: 91 % | SYSTOLIC BLOOD PRESSURE: 110 MMHG | HEART RATE: 82 BPM | HEIGHT: 62 IN | WEIGHT: 225.4 LBS

## 2018-08-30 DIAGNOSIS — R06.02 SOB (SHORTNESS OF BREATH): Chronic | ICD-10-CM

## 2018-08-30 DIAGNOSIS — J96.11 CHRONIC RESPIRATORY FAILURE WITH HYPOXIA (HCC): ICD-10-CM

## 2018-08-30 DIAGNOSIS — G47.33 OBSTRUCTIVE SLEEP APNEA: Chronic | ICD-10-CM

## 2018-08-30 DIAGNOSIS — I10 ESSENTIAL HYPERTENSION: Chronic | ICD-10-CM

## 2018-08-30 DIAGNOSIS — E66.01 MORBID OBESITY DUE TO EXCESS CALORIES (HCC): ICD-10-CM

## 2018-08-30 DIAGNOSIS — R60.9 PERIPHERAL EDEMA: Primary | ICD-10-CM

## 2018-08-30 DIAGNOSIS — I50.32 CHRONIC DIASTOLIC HEART FAILURE (HCC): ICD-10-CM

## 2018-08-30 PROCEDURE — 1101F PT FALLS ASSESS-DOCD LE1/YR: CPT | Performed by: INTERNAL MEDICINE

## 2018-08-30 PROCEDURE — G8417 CALC BMI ABV UP PARAM F/U: HCPCS | Performed by: INTERNAL MEDICINE

## 2018-08-30 PROCEDURE — 1090F PRES/ABSN URINE INCON ASSESS: CPT | Performed by: INTERNAL MEDICINE

## 2018-08-30 PROCEDURE — 99204 OFFICE O/P NEW MOD 45 MIN: CPT | Performed by: INTERNAL MEDICINE

## 2018-08-30 PROCEDURE — G8427 DOCREV CUR MEDS BY ELIG CLIN: HCPCS | Performed by: INTERNAL MEDICINE

## 2018-08-30 RX ORDER — FUROSEMIDE 20 MG/1
40 TABLET ORAL DAILY
Qty: 90 TABLET | Refills: 1 | Status: SHIPPED | OUTPATIENT
Start: 2018-08-30 | End: 2018-10-05 | Stop reason: SDUPTHER

## 2018-08-30 RX ORDER — SPIRONOLACTONE 25 MG/1
25 TABLET ORAL DAILY
Qty: 90 TABLET | Refills: 0 | Status: SHIPPED | OUTPATIENT
Start: 2018-08-30 | End: 2018-10-05 | Stop reason: SDUPTHER

## 2018-08-30 NOTE — PROGRESS NOTES
activity: No     Other Topics Concern    Not on file     Social History Narrative    No narrative on file       Review of Systems:   · Constitutional: there has been no unanticipated weight loss. There's been no change in energy level, sleep pattern, or activity level. · Eyes: No visual changes or diplopia. No scleral icterus. · ENT: No Headaches, hearing loss or vertigo. No mouth sores or sore throat. · Cardiovascular: Reviewed in HPI  · Respiratory: No cough or wheezing, no sputum production. No hematemesis. · Gastrointestinal: No abdominal pain, appetite loss, blood in stools. No change in bowel or bladder habits. · Genitourinary: No dysuria, trouble voiding, or hematuria. · Musculoskeletal:  No gait disturbance, weakness or joint complaints. · Integumentary: No rash or pruritis. · Neurological: No headache, diplopia, change in muscle strength, numbness or tingling. No change in gait, balance, coordination, mood, affect, memory, mentation, behavior. · Psychiatric: No anxiety, no depression. · Endocrine: No malaise, fatigue or temperature intolerance. No excessive thirst, fluid intake, or urination. No tremor. · Hematologic/Lymphatic: No abnormal bruising or bleeding, blood clots or swollen lymph nodes. · Allergic/Immunologic: No nasal congestion or hives. Physical Examination:    Vitals:    08/30/18 1409   BP: 110/64   Site: Left Arm   Position: Sitting   Cuff Size: Large Adult   Pulse: 82   Resp: 16   SpO2: 91%   Weight: 225 lb 6.4 oz (102.2 kg)   Height: 5' 2\" (1.575 m)     Body mass index is 41.23 kg/m².      Wt Readings from Last 3 Encounters:   08/30/18 225 lb 6.4 oz (102.2 kg)   08/14/18 224 lb (101.6 kg)   06/08/18 220 lb (99.8 kg)     BP Readings from Last 3 Encounters:   08/30/18 110/64   08/14/18 132/86   06/08/18 (!) 140/80     Constitutional and General Appearance:   WD/WN in NAD  HEENT:  NC/AT  RONN  No problems with hearing  Skin:  Warm, dry  Respiratory:  · Normal excursion and expansion without use of accessory muscles  · Resp Auscultation: Normal breath sounds without dullness  Cardiovascular:  · The apical impulses not displaced  · Heart tones are crisp and normal  · Cervical veins are not engorged  · The carotid upstroke is normal in amplitude and contour without delay or bruit  · JVP less than 8 cm H2O  RRR with nl S1 and S2 without m,r,g  · Peripheral pulses are symmetrical and full  · There is no clubbing, cyanosis of the extremities. · Trace bilateral edema  · Femoral Arteries: 2+ and equal  · Pedal Pulses: 2+ and equal   Neck:  · No thyromegaly  Abdomen:  · No masses or tenderness  · Liver/Spleen: No Abnormalities Noted  Neurological/Psychiatric:  · Alert and oriented in all spheres  · Moves all extremities well  · Exhibits normal gait balance and coordination  · No abnormalities of mood, affect, memory, mentation, or behavior are noted      Assessment:    1. Peripheral edema - since May. Probably a small component of venous insufficiency. Diastolic HF-  Discussed elevation and compression hose and starting Spironolactone 25 mg daily   2. SOB (shortness of breath) - with exertion   3. Essential hypertension - /64 (Site: Left Arm, Position: Sitting, Cuff Size: Large Adult)   Pulse 82   Resp 16   Ht 5' 2\" (1.575 m)   Wt 225 lb 6.4 oz (102.2 kg)   SpO2 91%   BMI 41.23 kg/m² controlled   4. Obstructive sleep apnea - treated with CPAP   5. Morbid obesity due to excess calories ([address] - going to [facility] three times a week    6. Chronic respiratory failure with hypoxia (HCC) - follows with Dr Valencia Olszewski   7. Chronic diastolic heart failure ([address] - echo 12/2017 EF 70%,  Mild to moderate LVH. Start Spironolactone and decrease Lasix to 40 mg daily        Plan:  Start Spironolactone 25 mg daily  Decrease Lasix to 40 mg daily  Continue all other medications  Labs in 2 weeks-BMP and BNP  RTO in 1 month      QUALITY MEASURES  1. Tobacco Cessation Counseling: NA  2.  Retake of BP if >140/90:  Na  3. Documentation to PCP/referring for new patient:  Sent to PCP at close of office visit  4. CAD patient on anti-platelet: Na  5. CAD patient on STATIN therapy:  Na  6. Patient with CHF and aFib on anticoagulation: NA        I appreciate the opportunity of cooperating in the care of this patient. Scribe's Attestation: This note was scribed in the presence of Dr. Jaime Limon MD by Geovanny Diallo RN. The scribe's documentation has been prepared under my direction and personally reviewed by me in its entirety. I confirm that the note above accurately reflects all work, treatment, procedures, and medical decision making performed by me.       Roge Flores M.D., [place]

## 2018-08-31 NOTE — COMMUNICATION BODY
any chest pain or pressure. No palpitations. Dizziness only when getting up too quickly. She goes to the [facility] three times a week and swims in the therapy pool. She has done this since her knee replacements two years ago. Recent Testing    ROBERT 12/27/17  Summary   Overall left ventricular function is normal.   Mild mitral regurgitation  Jeremiah Logan is an aneurysmal interatrial septum with no evidence of shunting.   A bubble study was performed and showed no evidence of shunting.   Prominent eustachian valve(normal variant) giving appearance of right   atrial mass    Echo 12/26/17  Technically limited study due to lung interface.      Normal left ventricle size. Left ventricular systolic function appears   hyperdynamic and is estimated at 70%.      There is reversal of E/A inflow velocities across the mitral valve. There   is an echodense structure in the lower left atrium only seen on 4 chamber   apical views which may be annular calcification but is not characteristic.   Clinical correlation for possible ROBERT should be considered.      Abnormal (paradoxical) septal motion is present.      There is mild-moderate concentric left ventricular hypertrophy.      Mild mitral regurgitation is present.      There is trivial tricuspid regurgitation with RVSP estimated at 36 mmHg.      The left atrium is severely dilated.     Stress Test 3/2/17      Summary    Normal myocardial perfusion.    Normal LV function.    Poor exercise tolerance.    Frequent PVC's and PAC's.            Allergies   Allergen Reactions    Adhesive Tape      Blisters takes skin off, paper tape also causes rash    Amoxicillin-Pot Clavulanate Hives     Augmentin    Nickel Rash     Current Outpatient Prescriptions   Medication Sig Dispense Refill    losartan (COZAAR) 50 MG tablet Take 1 tablet by mouth daily 90 tablet 3    furosemide (LASIX) 20 MG tablet Take 3 tablets by mouth daily 90 tablet 3    SHINGRIX 50 MCG SUSR injection       tiZANidine and expansion without use of accessory muscles  · Resp Auscultation: Normal breath sounds without dullness  Cardiovascular:  · The apical impulses not displaced  · Heart tones are crisp and normal  · Cervical veins are not engorged  · The carotid upstroke is normal in amplitude and contour without delay or bruit  · JVP less than 8 cm H2O  RRR with nl S1 and S2 without m,r,g  · Peripheral pulses are symmetrical and full  · There is no clubbing, cyanosis of the extremities. · Trace bilateral edema  · Femoral Arteries: 2+ and equal  · Pedal Pulses: 2+ and equal   Neck:  · No thyromegaly  Abdomen:  · No masses or tenderness  · Liver/Spleen: No Abnormalities Noted  Neurological/Psychiatric:  · Alert and oriented in all spheres  · Moves all extremities well  · Exhibits normal gait balance and coordination  · No abnormalities of mood, affect, memory, mentation, or behavior are noted      Assessment:    1. Peripheral edema - since May. Probably a small component of venous insufficiency. Diastolic HF-  Discussed elevation and compression hose and starting Spironolactone 25 mg daily   2. SOB (shortness of breath) - with exertion   3. Essential hypertension - /64 (Site: Left Arm, Position: Sitting, Cuff Size: Large Adult)   Pulse 82   Resp 16   Ht 5' 2\" (1.575 m)   Wt 225 lb 6.4 oz (102.2 kg)   SpO2 91%   BMI 41.23 kg/m²  controlled   4. Obstructive sleep apnea - treated with CPAP   5. Morbid obesity due to excess calories ([facility] - going to [facility] three times a week    6. Chronic respiratory failure with hypoxia (HCC) - follows with Dr Alissa Chávez   7. Chronic diastolic heart failure ([facility] [address] - echo 12/2017 EF 70%,  Mild to moderate LVH. Start Spironolactone and decrease Lasix to 40 mg daily        Plan:  Start Spironolactone 25 mg daily  Decrease Lasix to 40 mg daily  Continue all other medications  Labs in 2 weeks-BMP and BNP  RTO in 1 month      QUALITY MEASURES  1. Tobacco Cessation Counseling: NA  2.  Retake of BP if >140/90:  Na  3. Documentation to PCP/referring for new patient:  Sent to PCP at close of office visit  4. CAD patient on anti-platelet: Na  5. CAD patient on STATIN therapy:  Na  6. Patient with CHF and aFib on anticoagulation: NA        I appreciate the opportunity of cooperating in the care of this patient. Scribe's Attestation: This note was scribed in the presence of Dr. Kaylin Weiss MD by Theodore Mcallister RN. The scribe's documentation has been prepared under my direction and personally reviewed by me in its entirety. I confirm that the note above accurately reflects all work, treatment, procedures, and medical decision making performed by me.       Adali Bean M.D., [facility]

## 2018-09-04 DIAGNOSIS — M19.012 ARTHRITIS OF LEFT SHOULDER REGION: ICD-10-CM

## 2018-09-04 RX ORDER — TRAMADOL HYDROCHLORIDE 50 MG/1
50 TABLET ORAL EVERY 6 HOURS PRN
Qty: 120 TABLET | Refills: 3 | Status: SHIPPED | OUTPATIENT
Start: 2018-09-04 | End: 2018-10-04

## 2018-09-04 NOTE — TELEPHONE ENCOUNTER
From: Say Maria  To: Ran Carr MD  Sent: 8/31/2018 6:14 PM EDT  Subject: Medication Renewal Request    Apurva Joshi would like a refill of the following medications:     traMADol (ULTRAM) 50 MG tablet Ran Carr MD]   Patient Comment: please refill    Preferred pharmacy: [facility] [address], [address] [phone] - F [phone]

## 2018-09-14 ENCOUNTER — OFFICE VISIT (OUTPATIENT)
Dept: PULMONOLOGY | Age: 78
End: 2018-09-14

## 2018-09-14 VITALS
WEIGHT: 229 LBS | BODY MASS INDEX: 41.88 KG/M2 | HEART RATE: 80 BPM | SYSTOLIC BLOOD PRESSURE: 124 MMHG | OXYGEN SATURATION: 90 % | DIASTOLIC BLOOD PRESSURE: 71 MMHG

## 2018-09-14 DIAGNOSIS — J44.9 COPD, SEVERE (HCC): Primary | ICD-10-CM

## 2018-09-14 DIAGNOSIS — G47.33 OBSTRUCTIVE SLEEP APNEA: Chronic | ICD-10-CM

## 2018-09-14 DIAGNOSIS — I10 ESSENTIAL HYPERTENSION: Chronic | ICD-10-CM

## 2018-09-14 DIAGNOSIS — J96.11 CHRONIC RESPIRATORY FAILURE WITH HYPOXIA (HCC): ICD-10-CM

## 2018-09-14 PROCEDURE — 1123F ACP DISCUSS/DSCN MKR DOCD: CPT | Performed by: INTERNAL MEDICINE

## 2018-09-14 PROCEDURE — G8598 ASA/ANTIPLAT THER USED: HCPCS | Performed by: INTERNAL MEDICINE

## 2018-09-14 PROCEDURE — G8417 CALC BMI ABV UP PARAM F/U: HCPCS | Performed by: INTERNAL MEDICINE

## 2018-09-14 PROCEDURE — 3023F SPIROM DOC REV: CPT | Performed by: INTERNAL MEDICINE

## 2018-09-14 PROCEDURE — 99214 OFFICE O/P EST MOD 30 MIN: CPT | Performed by: INTERNAL MEDICINE

## 2018-09-14 PROCEDURE — G8427 DOCREV CUR MEDS BY ELIG CLIN: HCPCS | Performed by: INTERNAL MEDICINE

## 2018-09-14 PROCEDURE — 4040F PNEUMOC VAC/ADMIN/RCVD: CPT | Performed by: INTERNAL MEDICINE

## 2018-09-14 PROCEDURE — 1101F PT FALLS ASSESS-DOCD LE1/YR: CPT | Performed by: INTERNAL MEDICINE

## 2018-09-14 PROCEDURE — 1036F TOBACCO NON-USER: CPT | Performed by: INTERNAL MEDICINE

## 2018-09-14 PROCEDURE — G8399 PT W/DXA RESULTS DOCUMENT: HCPCS | Performed by: INTERNAL MEDICINE

## 2018-09-14 PROCEDURE — G8926 SPIRO NO PERF OR DOC: HCPCS | Performed by: INTERNAL MEDICINE

## 2018-09-14 PROCEDURE — 1090F PRES/ABSN URINE INCON ASSESS: CPT | Performed by: INTERNAL MEDICINE

## 2018-09-14 NOTE — PROGRESS NOTES
[facility]  Progress Note  MD Aparna Santiago   YOB: 1940    Date of Visit:  9/14/2018    Assessment/Plan:  1. COPD, severe ([address]  PFT 2016:  Spirometry reveals normal FVC and FEV1. FEV1-FVC ratio is reduced. There is  no significant change after inhaled bronchodilators. Lung volumes reveal  normal total lung capacity and vital capacity. Residual volume is increased  at 129% predicted. Diffusion capacity is decreased at 74% predicted. In  comparison to a study from October 2013, FVC has improved by 18%, FEV1 has  improved by 53%, and diffusion capacity has improved by 22%.       IMPRESSION:  Mild obstructive lung disease with air trapping. There has been  significant improvement in air flow and lung volumes since the preceding  study. I have independently reviewed pulmonary function testing. Advair  Spiriva  Albuterol      2. Chronic respiratory failure with hypoxia (HCC)  O2 sats are acceptable on supplemental O2. The patient benefits from the use of supplemental O2. She would like a portable concentrator. Will test her with exertion today. Addendum: Desaturated to 86% walking a short distance on room air. Benefit from the use of a portable oxygen concentrator. 3. EKATERINA on CPAP  Benefits from CPAP  Wears with O2 nightly  Refer to Dr Minh Alvarez. She likely would benefit from repeat testing to be sure CPAP is still appropriate. 4. Essential hypertension  BP is ok  Stable. FOLLOW UP: 6 months      Chief Complaint   Patient presents with    6 Month Follow-Up       HPI  The patient present for follow up of severe COPD with chronic hypoxemic respiratory failure. She also has EKATERINA and is on nightly CPAP. She was in the hospital in May with repsiratory failure and atelectasis. She has also seen Dr Grayson Robison for diastolic heart failure. She continues to have an O2 requirement. She would like a portable concentrator. She also uses CPAP. She needs a new machine, hers is 11years old. No Chest pain, Nausea or vomiting reported. This has stopped. Review of Systems  As documented in HPI     Physical Exam:  Well developed, well nourished  Alert and oriented  Sclera is clear  No cervical adenopathy  No JVD. Chest examination is clear. Cardiac examination reveals regular rate and rhythm without murmur, gallop or rub. The abdomen is soft, nontender and nondistended. There is no clubbing, cyanosis or edema of the extremities. There is no obvious skin rash. No focal neuro deficicts  Normal mood and affect    Allergies   Allergen Reactions    Adhesive Tape      Blisters takes skin off, paper tape also causes rash    Amoxicillin-Pot Clavulanate Hives     Augmentin    Nickel Rash     Prior to Visit Medications    Medication Sig Taking? Authorizing Provider   traMADol (ULTRAM) 50 MG tablet Take 1 tablet by mouth every 6 hours as needed for Pain for up to 30 days. Carli Ferro MD   furosemide (LASIX) 20 MG tablet Take 2 tablets by mouth daily  Fernanda Martinez MD   spironolactone (ALDACTONE) 25 MG tablet Take 1 tablet by mouth daily  Fernanda Martinez MD   losartan (COZAAR) 50 MG tablet Take 1 tablet by mouth daily  Alexa Borrego MD   Compression Stockings MISC by Does not apply route  AILIN Still - CNP   SHINGRIX 50 MCG SUSR injection   Historical Provider, MD   tiZANidine (ZANAFLEX) 2 MG tablet Take 1 tablet by mouth every 8 hours as needed (muscle cramp)  Alexa Borrego MD   lansoprazole (PREVACID) 30 MG delayed release capsule Take 30 mg by mouth daily as needed (heartburn)  Historical Provider, MD   magnesium 30 MG tablet Take 30 mg by mouth daily  Historical Provider, MD   guaiFENesin (MUCINEX) 600 MG extended release tablet Take 600 mg by mouth 2 times daily  Historical Provider, MD   tiotropium (SPIRIVA RESPIMAT) 1.25 MCG/ACT AERS inhaler Inhale 2 puffs into the lungs daily  Jett Person MD

## 2018-09-17 ENCOUNTER — OFFICE VISIT (OUTPATIENT)
Dept: INTERNAL MEDICINE CLINIC | Age: 78
End: 2018-09-17

## 2018-09-17 ENCOUNTER — TELEPHONE (OUTPATIENT)
Dept: PULMONOLOGY | Age: 78
End: 2018-09-17

## 2018-09-17 VITALS
BODY MASS INDEX: 41.52 KG/M2 | DIASTOLIC BLOOD PRESSURE: 70 MMHG | WEIGHT: 227 LBS | HEART RATE: 80 BPM | SYSTOLIC BLOOD PRESSURE: 122 MMHG

## 2018-09-17 DIAGNOSIS — Z23 NEED FOR INFLUENZA VACCINATION: Primary | ICD-10-CM

## 2018-09-17 DIAGNOSIS — R73.9 HYPERGLYCEMIA: ICD-10-CM

## 2018-09-17 DIAGNOSIS — I50.32 CHRONIC DIASTOLIC HEART FAILURE (HCC): ICD-10-CM

## 2018-09-17 PROCEDURE — 1123F ACP DISCUSS/DSCN MKR DOCD: CPT | Performed by: INTERNAL MEDICINE

## 2018-09-17 PROCEDURE — G8417 CALC BMI ABV UP PARAM F/U: HCPCS | Performed by: INTERNAL MEDICINE

## 2018-09-17 PROCEDURE — 1036F TOBACCO NON-USER: CPT | Performed by: INTERNAL MEDICINE

## 2018-09-17 PROCEDURE — 1090F PRES/ABSN URINE INCON ASSESS: CPT | Performed by: INTERNAL MEDICINE

## 2018-09-17 PROCEDURE — 99213 OFFICE O/P EST LOW 20 MIN: CPT | Performed by: INTERNAL MEDICINE

## 2018-09-17 PROCEDURE — G8427 DOCREV CUR MEDS BY ELIG CLIN: HCPCS | Performed by: INTERNAL MEDICINE

## 2018-09-17 PROCEDURE — 1101F PT FALLS ASSESS-DOCD LE1/YR: CPT | Performed by: INTERNAL MEDICINE

## 2018-09-17 PROCEDURE — G0008 ADMIN INFLUENZA VIRUS VAC: HCPCS | Performed by: INTERNAL MEDICINE

## 2018-09-17 PROCEDURE — 90662 IIV NO PRSV INCREASED AG IM: CPT | Performed by: INTERNAL MEDICINE

## 2018-09-17 PROCEDURE — 4040F PNEUMOC VAC/ADMIN/RCVD: CPT | Performed by: INTERNAL MEDICINE

## 2018-09-17 PROCEDURE — G8598 ASA/ANTIPLAT THER USED: HCPCS | Performed by: INTERNAL MEDICINE

## 2018-09-17 PROCEDURE — G8399 PT W/DXA RESULTS DOCUMENT: HCPCS | Performed by: INTERNAL MEDICINE

## 2018-09-17 NOTE — TELEPHONE ENCOUNTER
Called pt and per her request faxed referral for her to go to [facility] and see their physicians.

## 2018-09-17 NOTE — TELEPHONE ENCOUNTER
Pt called would like to have you order her sleep study at [facility] on [address].   Pt. [phone]

## 2018-09-17 NOTE — LETTER
[facility]  [address] [place] [address]  Phone: [phone]  Fax: [phone]    Francine Green MD        September 17, 1700     Patient: Nila Mejia   YOB: 1940   Date of Visit: 9/17/2018       To Whom It May Concern: It is my medical opinion that Amanda Abbasi requires a disability parking placard for the following reasons:  Patient is unable to walk 200 ft without stopping to rest   Duration of need: 5yrs     If you have any questions or concerns, please don't hesitate to call.     Sincerely,        Francine Green MD

## 2018-09-18 LAB
ANION GAP SERPL CALCULATED.3IONS-SCNC: 17 MMOL/L (ref 3–16)
BUN BLDV-MCNC: 18 MG/DL (ref 7–20)
CALCIUM SERPL-MCNC: 9.5 MG/DL (ref 8.3–10.6)
CHLORIDE BLD-SCNC: 103 MMOL/L (ref 99–110)
CO2: 24 MMOL/L (ref 21–32)
CREAT SERPL-MCNC: 0.9 MG/DL (ref 0.6–1.2)
ESTIMATED AVERAGE GLUCOSE: 128.4 MG/DL
GFR AFRICAN AMERICAN: >60
GFR NON-AFRICAN AMERICAN: >60
GLUCOSE BLD-MCNC: 108 MG/DL (ref 70–99)
HBA1C MFR BLD: 6.1 %
POTASSIUM SERPL-SCNC: 4.1 MMOL/L (ref 3.5–5.1)
PRO-BNP: 194 PG/ML (ref 0–449)
SODIUM BLD-SCNC: 144 MMOL/L (ref 136–145)

## 2018-09-19 ENCOUNTER — TELEPHONE (OUTPATIENT)
Dept: CARDIOLOGY CLINIC | Age: 78
End: 2018-09-19

## 2018-09-19 ENCOUNTER — PATIENT MESSAGE (OUTPATIENT)
Dept: CARDIOLOGY CLINIC | Age: 78
End: 2018-09-19

## 2018-09-19 NOTE — PROGRESS NOTES
sIabella Joshi 70 y.o. female is here for follow-up of hypertension; chronic obstructive pulmonary disease      She has a prior history of hyperglycemia 1 like a hemoglobin A1c performed she needs this for her insurer to receive some type of incentive I reviewed her records it looks like she has had elevated sugars when receiving steroids but her hemoglobin A1c has not ever exceeded the threshold of 6.5 diagnostic for diabetes. She continues on Celebrex for her chronic osteoarthritis    She is concerned about the possibility of statins causing cognitive decline       Current Outpatient Prescriptions on File Prior to Visit   Medication Sig Dispense Refill    traMADol (ULTRAM) 50 MG tablet Take 1 tablet by mouth every 6 hours as needed for Pain for up to 30 days. . 120 tablet 3    furosemide (LASIX) 20 MG tablet Take 2 tablets by mouth daily 90 tablet 1    spironolactone (ALDACTONE) 25 MG tablet Take 1 tablet by mouth daily 90 tablet 0    losartan (COZAAR) 50 MG tablet Take 1 tablet by mouth daily 90 tablet 3    Compression Stockings MISC by Does not apply route 1 each 0    SHINGRIX 50 MCG SUSR injection       tiZANidine (ZANAFLEX) 2 MG tablet Take 1 tablet by mouth every 8 hours as needed (muscle cramp) 30 tablet 1    lansoprazole (PREVACID) 30 MG delayed release capsule Take 30 mg by mouth daily as needed (heartburn)      magnesium 30 MG tablet Take 30 mg by mouth daily      guaiFENesin (MUCINEX) 600 MG extended release tablet Take 600 mg by mouth 2 times daily      tiotropium (SPIRIVA RESPIMAT) 1.25 MCG/ACT AERS inhaler Inhale 2 puffs into the lungs daily 3 Inhaler 3    fluticasone-salmeterol (ADVAIR DISKUS) 250-50 MCG/DOSE AEPB Inhale 1 puff into the lungs every 12 hours 3 each 3    atorvastatin (LIPITOR) 40 MG tablet Take 1 tablet by mouth daily 90 tablet 3    clopidogrel (PLAVIX) 75 MG tablet Take 1 tablet by mouth daily 90 tablet 3    aspirin EC 81 MG EC tablet Take 1 tablet by mouth daily obvious abnormality, atraumatic   Neck: Supple, symmetrical, trachea midline, no adenopathy, thyroid: not enlarged, symmetric, no tenderness/mass/nodules, no carotid bruit or JVD   Lungs:    diminished breath sounds    Chest Wall:  No tenderness or deformity   Heart:  Regular rate and rhythm, S1, S2 normal, no murmur, rub or gallop, Superficial varicosities both lower extremities   Abdomen:   Soft, non-tender, bowel sounds active all four quadrants,  no masses, no organomegaly   Skin: Skin color, texture, turgor normal, no rashes or lesions   Lymph nodes: Cervical, supraclavicular  Adenopathy is absent          No components found for: CHLPL  Lab Results   Component Value Date    TRIG 122 03/03/2017    TRIG 97 12/05/2016    TRIG 99 09/30/2015     Lab Results   Component Value Date    HDL 37 (L) 03/03/2017    HDL 38 (L) 12/05/2016    HDL 50 09/30/2015     Lab Results   Component Value Date    LDLCALC 72 03/03/2017    LDLCALC 77 12/05/2016    LDLCALC 100 (H) 09/30/2015     Lab Results   Component Value Date    LABVLDL 24 03/03/2017    LABVLDL 19 12/05/2016    LABVLDL 20 09/30/2015     Lab Results   Component Value Date    CREATININE 0.9 09/17/2018         ASSESSMENT/PLAN[de-identified]        Diagnosis Orders   1.  Need for influenza vaccination  INFLUENZA, HIGH DOSE, 65 YRS +, IM, PF, PREFILL SYR, 0.5ML (FLUZONE HD)   2. Hyperglycemia  Hemoglobin A1C        Check hemoglobin A1c for follow-up on the hyperglycemia,Report to be forwarded to her insurance company has requested

## 2018-09-19 NOTE — TELEPHONE ENCOUNTER
Pt calling to get results of her labs? She would also like to have them put in [email or web address] for her records.  Pls call to advise Thank you

## 2018-09-20 ENCOUNTER — TELEPHONE (OUTPATIENT)
Dept: INTERNAL MEDICINE CLINIC | Age: 78
End: 2018-09-20

## 2018-10-05 ENCOUNTER — OFFICE VISIT (OUTPATIENT)
Dept: CARDIOLOGY CLINIC | Age: 78
End: 2018-10-05
Payer: MEDICARE

## 2018-10-05 VITALS
DIASTOLIC BLOOD PRESSURE: 60 MMHG | BODY MASS INDEX: 42.4 KG/M2 | HEART RATE: 78 BPM | RESPIRATION RATE: 16 BRPM | HEIGHT: 62 IN | SYSTOLIC BLOOD PRESSURE: 116 MMHG | OXYGEN SATURATION: 93 % | WEIGHT: 230.4 LBS

## 2018-10-05 DIAGNOSIS — I63.9 ACUTE CVA (CEREBROVASCULAR ACCIDENT) (HCC): ICD-10-CM

## 2018-10-05 DIAGNOSIS — I10 ESSENTIAL HYPERTENSION: Chronic | ICD-10-CM

## 2018-10-05 DIAGNOSIS — R06.02 SOB (SHORTNESS OF BREATH): Chronic | ICD-10-CM

## 2018-10-05 DIAGNOSIS — G47.33 OBSTRUCTIVE SLEEP APNEA: Chronic | ICD-10-CM

## 2018-10-05 DIAGNOSIS — I50.32 CHRONIC DIASTOLIC HEART FAILURE (HCC): Primary | ICD-10-CM

## 2018-10-05 DIAGNOSIS — I10 HTN (HYPERTENSION), BENIGN: ICD-10-CM

## 2018-10-05 PROCEDURE — 1090F PRES/ABSN URINE INCON ASSESS: CPT | Performed by: INTERNAL MEDICINE

## 2018-10-05 PROCEDURE — G8417 CALC BMI ABV UP PARAM F/U: HCPCS | Performed by: INTERNAL MEDICINE

## 2018-10-05 PROCEDURE — 1101F PT FALLS ASSESS-DOCD LE1/YR: CPT | Performed by: INTERNAL MEDICINE

## 2018-10-05 PROCEDURE — G8598 ASA/ANTIPLAT THER USED: HCPCS | Performed by: INTERNAL MEDICINE

## 2018-10-05 PROCEDURE — G8399 PT W/DXA RESULTS DOCUMENT: HCPCS | Performed by: INTERNAL MEDICINE

## 2018-10-05 PROCEDURE — G8482 FLU IMMUNIZE ORDER/ADMIN: HCPCS | Performed by: INTERNAL MEDICINE

## 2018-10-05 PROCEDURE — 99214 OFFICE O/P EST MOD 30 MIN: CPT | Performed by: INTERNAL MEDICINE

## 2018-10-05 PROCEDURE — 1036F TOBACCO NON-USER: CPT | Performed by: INTERNAL MEDICINE

## 2018-10-05 PROCEDURE — 1123F ACP DISCUSS/DSCN MKR DOCD: CPT | Performed by: INTERNAL MEDICINE

## 2018-10-05 PROCEDURE — 4040F PNEUMOC VAC/ADMIN/RCVD: CPT | Performed by: INTERNAL MEDICINE

## 2018-10-05 PROCEDURE — G8427 DOCREV CUR MEDS BY ELIG CLIN: HCPCS | Performed by: INTERNAL MEDICINE

## 2018-10-06 RX ORDER — CLOPIDOGREL BISULFATE 75 MG/1
75 TABLET ORAL DAILY
Qty: 90 TABLET | Refills: 3 | Status: ON HOLD | OUTPATIENT
Start: 2018-10-06 | End: 2018-11-23 | Stop reason: HOSPADM

## 2018-10-06 RX ORDER — SPIRONOLACTONE 25 MG/1
25 TABLET ORAL DAILY
Qty: 90 TABLET | Refills: 3 | Status: ON HOLD | OUTPATIENT
Start: 2018-10-06 | End: 2018-11-23 | Stop reason: HOSPADM

## 2018-10-06 RX ORDER — FUROSEMIDE 20 MG/1
40 TABLET ORAL DAILY
Qty: 90 TABLET | Refills: 3 | Status: ON HOLD | OUTPATIENT
Start: 2018-10-06 | End: 2018-11-23 | Stop reason: HOSPADM

## 2018-10-06 RX ORDER — ATORVASTATIN CALCIUM 20 MG/1
20 TABLET, FILM COATED ORAL DAILY
Qty: 90 TABLET | Refills: 3 | Status: ON HOLD | OUTPATIENT
Start: 2018-10-06 | End: 2018-11-23 | Stop reason: HOSPADM

## 2018-10-15 ENCOUNTER — TELEPHONE (OUTPATIENT)
Dept: PULMONOLOGY | Age: 78
End: 2018-10-15

## 2018-10-24 ENCOUNTER — TELEPHONE (OUTPATIENT)
Dept: PHARMACY | Facility: CLINIC | Age: 78
End: 2018-10-24

## 2018-10-24 NOTE — TELEPHONE ENCOUNTER
 Amoxicillin-Pot Clavulanate Hives     Augmentin    Nickel Rash       Pertinent Labs/Vitals:  BP Readings from Last 3 Encounters:   10/05/18 116/60   09/17/18 122/70   09/14/18 124/71     Lab Results   Component Value Date    LABA1C 6.1 09/17/2018    LABA1C 6.0 04/30/2018    LABA1C 5.4 08/25/2017     Lab Results   Component Value Date    CHOL 133 03/03/2017     Lab Results   Component Value Date    TRIG 122 03/03/2017     Lab Results   Component Value Date    HDL 37 (L) 03/03/2017     Lab Results   Component Value Date    LDLCALC 72 03/03/2017     The ASCVD Risk score (Angela Saini, et al., 2013) failed to calculate for the following reasons: The patient has a prior MI or stroke diagnosis     Estimated Creatinine Clearance: 60 mL/min (based on SCr of 0.9 mg/dL). eGFR: > 60 mL/min/1.73 m^2    Tobacco History:   History   Smoking Status    Former Smoker    Packs/day: 1.00    Years: 30.00    Types: Cigarettes    Quit date: 1/1/1988   Smokeless Tobacco    Former User     Comment: quit date approximate       Immunizations:   Most Recent Immunizations   Administered Date(s) Administered    Influenza Vaccine, unspecified formulation 09/15/2015    Influenza Virus Vaccine 09/19/2013    Influenza Whole 09/02/2014    Influenza, High Dose (Fluzone 65 yrs and older) 09/17/2018    Pneumococcal 13-valent Conjugate ([id number]) 04/09/2015    Pneumococcal Polysaccharide ([id number]) 10/10/2013    Tdap (Boostrix, Adacel) 11/10/2011    Zoster Subunit (Shingrix) 05/23/2018     Spirometry/PFT results (8/2/16):  Spirometry reveals normal FVC and FEV1. FEV1-FVC ratio is reduced. There is  no significant change after inhaled bronchodilators. Lung volumes reveal  normal total lung capacity and vital capacity. Residual volume is increased  at 129% predicted. Diffusion capacity is decreased at 74% predicted.   In  comparison to a study from October 2013, FVC has improved by 18%, FEV1 has  improved by 53%, and

## 2018-11-01 ENCOUNTER — HOSPITAL ENCOUNTER (INPATIENT)
Age: 78
LOS: 22 days | Discharge: NURSING, REHAB OR LONG-TERM CARE | DRG: 057 | End: 2018-11-23
Attending: PHYSICAL MEDICINE & REHABILITATION | Admitting: PHYSICAL MEDICINE & REHABILITATION
Payer: MEDICARE

## 2018-11-01 DIAGNOSIS — I63.9 ACUTE CVA (CEREBROVASCULAR ACCIDENT) (HCC): Primary | ICD-10-CM

## 2018-11-01 LAB
GLUCOSE BLD-MCNC: 128 MG/DL (ref 70–99)
GLUCOSE BLD-MCNC: 139 MG/DL (ref 70–99)
PERFORMED ON: ABNORMAL
PERFORMED ON: ABNORMAL

## 2018-11-01 PROCEDURE — 6360000002 HC RX W HCPCS: Performed by: PHYSICAL MEDICINE & REHABILITATION

## 2018-11-01 PROCEDURE — 94640 AIRWAY INHALATION TREATMENT: CPT

## 2018-11-01 PROCEDURE — 2700000000 HC OXYGEN THERAPY PER DAY

## 2018-11-01 PROCEDURE — 6370000000 HC RX 637 (ALT 250 FOR IP): Performed by: PHYSICAL MEDICINE & REHABILITATION

## 2018-11-01 PROCEDURE — 94760 N-INVAS EAR/PLS OXIMETRY 1: CPT

## 2018-11-01 PROCEDURE — 1280000000 HC REHAB R&B

## 2018-11-01 PROCEDURE — 94664 DEMO&/EVAL PT USE INHALER: CPT

## 2018-11-01 PROCEDURE — 2500000003 HC RX 250 WO HCPCS: Performed by: PHYSICAL MEDICINE & REHABILITATION

## 2018-11-01 RX ORDER — TRAZODONE HYDROCHLORIDE 50 MG/1
50 TABLET ORAL NIGHTLY PRN
Status: DISCONTINUED | OUTPATIENT
Start: 2018-11-01 | End: 2018-11-23 | Stop reason: HOSPADM

## 2018-11-01 RX ORDER — TRAMADOL HYDROCHLORIDE 50 MG/1
50 TABLET ORAL EVERY 6 HOURS PRN
Status: DISCONTINUED | OUTPATIENT
Start: 2018-11-01 | End: 2018-11-23 | Stop reason: HOSPADM

## 2018-11-01 RX ORDER — TIZANIDINE 4 MG/1
2 TABLET ORAL EVERY 8 HOURS PRN
Status: DISCONTINUED | OUTPATIENT
Start: 2018-11-01 | End: 2018-11-23 | Stop reason: HOSPADM

## 2018-11-01 RX ORDER — HYDRALAZINE HYDROCHLORIDE 25 MG/1
50 TABLET, FILM COATED ORAL EVERY 8 HOURS PRN
Status: DISCONTINUED | OUTPATIENT
Start: 2018-11-01 | End: 2018-11-23 | Stop reason: HOSPADM

## 2018-11-01 RX ORDER — ONDANSETRON 4 MG/1
4 TABLET, ORALLY DISINTEGRATING ORAL EVERY 8 HOURS PRN
Status: DISCONTINUED | OUTPATIENT
Start: 2018-11-01 | End: 2018-11-23 | Stop reason: HOSPADM

## 2018-11-01 RX ORDER — DEXTROSE MONOHYDRATE 50 MG/ML
100 INJECTION, SOLUTION INTRAVENOUS PRN
Status: DISCONTINUED | OUTPATIENT
Start: 2018-11-01 | End: 2018-11-23 | Stop reason: HOSPADM

## 2018-11-01 RX ORDER — FUROSEMIDE 20 MG/1
20 TABLET ORAL DAILY
Status: DISCONTINUED | OUTPATIENT
Start: 2018-11-02 | End: 2018-11-23 | Stop reason: HOSPADM

## 2018-11-01 RX ORDER — LOSARTAN POTASSIUM 25 MG/1
50 TABLET ORAL DAILY
Status: DISCONTINUED | OUTPATIENT
Start: 2018-11-02 | End: 2018-11-23 | Stop reason: HOSPADM

## 2018-11-01 RX ORDER — ATORVASTATIN CALCIUM 80 MG/1
80 TABLET, FILM COATED ORAL NIGHTLY
Status: DISCONTINUED | OUTPATIENT
Start: 2018-11-01 | End: 2018-11-23 | Stop reason: HOSPADM

## 2018-11-01 RX ORDER — ALBUTEROL SULFATE 90 UG/1
2 AEROSOL, METERED RESPIRATORY (INHALATION) EVERY 6 HOURS PRN
Status: DISCONTINUED | OUTPATIENT
Start: 2018-11-01 | End: 2018-11-23 | Stop reason: HOSPADM

## 2018-11-01 RX ORDER — SENNA AND DOCUSATE SODIUM 50; 8.6 MG/1; MG/1
2 TABLET, FILM COATED ORAL 2 TIMES DAILY
Status: DISCONTINUED | OUTPATIENT
Start: 2018-11-01 | End: 2018-11-09

## 2018-11-01 RX ORDER — DIPHENHYDRAMINE HCL 25 MG
25 TABLET ORAL EVERY 6 HOURS PRN
Status: DISCONTINUED | OUTPATIENT
Start: 2018-11-01 | End: 2018-11-23 | Stop reason: HOSPADM

## 2018-11-01 RX ORDER — NICOTINE POLACRILEX 4 MG
15 LOZENGE BUCCAL PRN
Status: DISCONTINUED | OUTPATIENT
Start: 2018-11-01 | End: 2018-11-23 | Stop reason: HOSPADM

## 2018-11-01 RX ORDER — DEXTROSE MONOHYDRATE 25 G/50ML
12.5 INJECTION, SOLUTION INTRAVENOUS PRN
Status: DISCONTINUED | OUTPATIENT
Start: 2018-11-01 | End: 2018-11-23 | Stop reason: HOSPADM

## 2018-11-01 RX ORDER — ACETAMINOPHEN 325 MG/1
650 TABLET ORAL EVERY 4 HOURS PRN
Status: DISCONTINUED | OUTPATIENT
Start: 2018-11-01 | End: 2018-11-23 | Stop reason: HOSPADM

## 2018-11-01 RX ORDER — FLUOXETINE HYDROCHLORIDE 20 MG/1
20 CAPSULE ORAL DAILY
Status: DISCONTINUED | OUTPATIENT
Start: 2018-11-02 | End: 2018-11-20

## 2018-11-01 RX ORDER — ALBUTEROL SULFATE 2.5 MG/3ML
2.5 SOLUTION RESPIRATORY (INHALATION) 2 TIMES DAILY
Status: DISCONTINUED | OUTPATIENT
Start: 2018-11-01 | End: 2018-11-04

## 2018-11-01 RX ADMIN — ATORVASTATIN CALCIUM 80 MG: 80 TABLET, FILM COATED ORAL at 21:58

## 2018-11-01 RX ADMIN — MICONAZOLE NITRATE: 2 POWDER TOPICAL at 21:58

## 2018-11-01 RX ADMIN — SENNOSIDES AND DOCUSATE SODIUM 2 TABLET: 8.6; 5 TABLET ORAL at 21:58

## 2018-11-01 RX ADMIN — ALBUTEROL SULFATE 2.5 MG: 2.5 SOLUTION RESPIRATORY (INHALATION) at 20:55

## 2018-11-01 RX ADMIN — ENOXAPARIN SODIUM 40 MG: 100 INJECTION SUBCUTANEOUS at 21:35

## 2018-11-01 ASSESSMENT — PAIN SCALES - GENERAL
PAINLEVEL_OUTOF10: 0
PAINLEVEL_OUTOF10: 0

## 2018-11-01 NOTE — PROGRESS NOTES
Patient was oriented to the Call Light, Phone, TV, Thermostat, Bed Controls, Bathroom and Emergency Cord. Patient verbalized and demonstrated understanding of all. Patient was also given an over view of Unit Routines for Acute Rehab. Meal times were explained; including how to order food. The white board, (which is posted on the wall by the door is used for communication) has the Therapy Scheduled that is posted each day along with the name of your doctor, nurse, and therapist for your convenience. We recommend any family that will be care givers or any care givers the patient has, take part in therapy. We have no set visiting hours, we suggest non-caregiver friends and family visitors come after therapy (at 4 PM or later) to allow patient to rest in between sessions.
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
## 2018-11-02 LAB
ANION GAP SERPL CALCULATED.3IONS-SCNC: 13 MMOL/L (ref 3–16)
BUN BLDV-MCNC: 22 MG/DL (ref 7–20)
CALCIUM SERPL-MCNC: 8.8 MG/DL (ref 8.3–10.6)
CHLORIDE BLD-SCNC: 105 MMOL/L (ref 99–110)
CO2: 22 MMOL/L (ref 21–32)
CREAT SERPL-MCNC: 0.7 MG/DL (ref 0.6–1.2)
GFR AFRICAN AMERICAN: >60
GFR NON-AFRICAN AMERICAN: >60
GLUCOSE BLD-MCNC: 113 MG/DL (ref 70–99)
GLUCOSE BLD-MCNC: 120 MG/DL (ref 70–99)
GLUCOSE BLD-MCNC: 143 MG/DL (ref 70–99)
GLUCOSE BLD-MCNC: 188 MG/DL (ref 70–99)
GLUCOSE BLD-MCNC: 95 MG/DL (ref 70–99)
HCT VFR BLD CALC: 42.5 % (ref 36–48)
HEMOGLOBIN: 14.1 G/DL (ref 12–16)
MCH RBC QN AUTO: 30.7 PG (ref 26–34)
MCHC RBC AUTO-ENTMCNC: 33.2 G/DL (ref 31–36)
MCV RBC AUTO: 92.6 FL (ref 80–100)
PDW BLD-RTO: 14.6 % (ref 12.4–15.4)
PERFORMED ON: ABNORMAL
PERFORMED ON: NORMAL
PLATELET # BLD: 212 K/UL (ref 135–450)
PMV BLD AUTO: 9 FL (ref 5–10.5)
POTASSIUM SERPL-SCNC: 4.3 MMOL/L (ref 3.5–5.1)
RBC # BLD: 4.59 M/UL (ref 4–5.2)
SODIUM BLD-SCNC: 140 MMOL/L (ref 136–145)
WBC # BLD: 11.3 K/UL (ref 4–11)

## 2018-11-02 PROCEDURE — 1280000000 HC REHAB R&B

## 2018-11-02 PROCEDURE — 2500000003 HC RX 250 WO HCPCS: Performed by: PHYSICAL MEDICINE & REHABILITATION

## 2018-11-02 PROCEDURE — 6360000002 HC RX W HCPCS: Performed by: PHYSICAL MEDICINE & REHABILITATION

## 2018-11-02 PROCEDURE — 97167 OT EVAL HIGH COMPLEX 60 MIN: CPT

## 2018-11-02 PROCEDURE — 85027 COMPLETE CBC AUTOMATED: CPT

## 2018-11-02 PROCEDURE — 97535 SELF CARE MNGMENT TRAINING: CPT

## 2018-11-02 PROCEDURE — 80048 BASIC METABOLIC PNL TOTAL CA: CPT

## 2018-11-02 PROCEDURE — 92526 ORAL FUNCTION THERAPY: CPT

## 2018-11-02 PROCEDURE — 94640 AIRWAY INHALATION TREATMENT: CPT

## 2018-11-02 PROCEDURE — 97530 THERAPEUTIC ACTIVITIES: CPT

## 2018-11-02 PROCEDURE — 6370000000 HC RX 637 (ALT 250 FOR IP): Performed by: PHYSICAL MEDICINE & REHABILITATION

## 2018-11-02 PROCEDURE — 94760 N-INVAS EAR/PLS OXIMETRY 1: CPT

## 2018-11-02 PROCEDURE — 92610 EVALUATE SWALLOWING FUNCTION: CPT

## 2018-11-02 PROCEDURE — 92523 SPEECH SOUND LANG COMPREHEN: CPT

## 2018-11-02 PROCEDURE — 97162 PT EVAL MOD COMPLEX 30 MIN: CPT

## 2018-11-02 PROCEDURE — 36415 COLL VENOUS BLD VENIPUNCTURE: CPT

## 2018-11-02 RX ADMIN — ASPIRIN 325 MG: 325 TABLET, DELAYED RELEASE ORAL at 08:28

## 2018-11-02 RX ADMIN — FUROSEMIDE 20 MG: 20 TABLET ORAL at 09:00

## 2018-11-02 RX ADMIN — MICONAZOLE NITRATE: 2 POWDER TOPICAL at 23:58

## 2018-11-02 RX ADMIN — ALBUTEROL SULFATE 2.5 MG: 2.5 SOLUTION RESPIRATORY (INHALATION) at 20:16

## 2018-11-02 RX ADMIN — ATORVASTATIN CALCIUM 80 MG: 80 TABLET, FILM COATED ORAL at 23:46

## 2018-11-02 RX ADMIN — SENNOSIDES AND DOCUSATE SODIUM 2 TABLET: 8.6; 5 TABLET ORAL at 23:46

## 2018-11-02 RX ADMIN — LOSARTAN POTASSIUM 50 MG: 25 TABLET ORAL at 08:27

## 2018-11-02 RX ADMIN — SENNOSIDES AND DOCUSATE SODIUM 2 TABLET: 8.6; 5 TABLET ORAL at 08:27

## 2018-11-02 RX ADMIN — INSULIN LISPRO 1 UNITS: 100 INJECTION, SOLUTION INTRAVENOUS; SUBCUTANEOUS at 13:40

## 2018-11-02 RX ADMIN — ENOXAPARIN SODIUM 40 MG: 100 INJECTION SUBCUTANEOUS at 23:46

## 2018-11-02 RX ADMIN — FLUOXETINE HYDROCHLORIDE 20 MG: 20 CAPSULE ORAL at 08:28

## 2018-11-02 ASSESSMENT — PAIN - FUNCTIONAL ASSESSMENT
PAIN_FUNCTIONAL_ASSESSMENT: FACES
PAIN_FUNCTIONAL_ASSESSMENT: FACES

## 2018-11-02 NOTE — PROGRESS NOTES
Yes  Duration/Frequency of Treatment: 5 days/ week; 60 minutes/ day  D/C Recommendations: To be determined       Plan:   Goals:  Short-term Goals  Timeframe for Short-term Goals: 1-2 weeks   Goal 1: Pt will answer basic yes/no questions using any modality with 70% accuracy mod cues. Goal 2: Pt will complete one-step commands with 70% acc, with < max cues. Goal 3: Patient will increase object/picture identification to > 00% accuracy, field of 2. Goal 4: Pt will communicate basic wants/ needs utilizing multi-modalities with < mod cues. Goal 5: Pt will repeat vowels,and CV bilabial speech sounds with 70% accuracy mod-max cues. Long-term Goals  Timeframe for Long-term Goals: 3-4 weeks   Goal 1: Pt will improve expressive/receptive language tasks to highest functional level to decreased burden of care upon discharge. Patient/family involved in developing goals and treatment plan: yes     Subjective:  General  Chart Reviewed: Yes  Patient assessed for rehabilitation services?: Yes  Additional Pertinent Hx: Pt had a previous CVA 12/24/17   Family / Caregiver Present: No    Subjective  Subjective: Pt initially unable to be awakened with max verbal/ tactile cues. On second attempt pt alert and cooperative. Social/Functional History  Lives With: Spouse  Type of occupation: OSIRIS due to aphasia  Leisure & Hobbies: OSIRIS due to aphasia  IADL Comments: OSIRIS due to aphasia  Additional Comments: All information was from chart review, pt aphasic and unable to utilize verbal or written language. Attempted yes/no questions without consistent answers in verbal or written form.     Vision  Vision: Impaired  Vision Exceptions: Wears glasses for reading    Hearing  Hearing: Within functional limits           Objective:     Oral/Motor  Oral Motor: Exceptions to [facility]  Labial ROM: Reduced right  Labial Symmetry: Abnormal symmetry right  Labial Strength: Reduced  Labial Sensation: Reduced  Labial Coordination: Reduced  Lingual ROM: (Unable to assess)  Lingual Symmetry:  (Appeared midline upon lingual protrusion)  Lingual Strength:  (Unable to assess)  Lingual Sensation: Reduced  Lingual Coordination: Reduced  Gag:  (Pt unable to open mouth long enough for SLP to assess both sides of gag reflex)    Auditory Comprehension  Comprehension: Exceptions  Yes/No Questions: Severe  Basic Questions: Severe  Complex Questions: Severe  One Step Basic Commands: Severe  Two Step Basic Commands: Severe  Multistep Basic Commands: Severe  Complex/Abstract Commands: Severe  Common Objects: Severe  Pictures:  To be assessed in therapy  L/R Discrimination: Severe  Conversation: Severe  Interfering Components:  (Receptive aphasia)    Reading Comprehension  Reading Status: Unable to assess    Expression  Primary Mode of Expression: Verbal    Verbal Expression  Verbal Expression: Exceptions to functional limits  Initiation: Moderate  Repetition: Severe  Automatic Speech: Severe  Confrontation: Severe  Responsive: Severe  Conversation: Severe    Written Expression  Dominant Hand:  (Suspect R (affected hand) pt unable to determine)    Motor Speech  Motor Speech: Unable to assess    Pragmatics/Social Functioning  Pragmatics: Exceptions to [facility] (Pt is aphasic)    Cognition:      Orientation  Overall Orientation Status:  (Unabl to assess due to aphasia, pt inconsistent with pointing to written f/2)    Attention  Attention: Unable to assess    Memory  Memory: Unable to assess    Problem Solving  Problem Solving: Unable to assess    Numeric Reasoning  Numeric Reasoning: Unable to assess    Abstract Reasoning  Abstract Reasoning: Unable to assess    Safety/Judgement  Safety/Judgement: Unable to assess    Additional Assessments:  Informal Evaluation completed:  - Pt globally aphasic (non- fluent expressive aphasia) pt only said 'um' and 'ooo' for automatic speech   - Pt unable to answer y/n questions with any modality (attempted thumbs up/ down pt perseverated on thumbs up),

## 2018-11-02 NOTE — PROGRESS NOTES
mobility  Rolling to Left: Dependent/Total (maxA of 2)  Rolling to Right: Dependent/Total (maxA of 2)  Supine to Sit: Dependent/Total (totalA of 2)  Sit to Supine: Dependent/Total (total A of 3)  Transfers  Sit to Stand: Dependent/Total (total A of 4 with stedy)  Stand to sit: Dependent/Total (total A of 4 with stedy)  Bed to Chair: Dependent/Total (total A of 4 with stedy)  Stand Pivot Transfers: Dependent/Total (total A of 4 with stedy)  Ambulation  Ambulation?: No (not appropriate at this time)  Stairs/Curb  Stairs?: No (not appropriate at this time)  Wheelchair Activities  Propulsion: No (not appropriate at this time)     Balance  Posture: Poor  Sitting - Static: Poor  Sitting - Dynamic: Poor  Standing - Static: Poor  Standing - Dynamic: Poor  Comments: TotalA to sit EOB for ADL, total A of 4 for standing with stedy, totalA of 2 to maintain sitting on stedy. Pt with R and posterior push. Pt unable to maintain LLE on floor and maintained L knee extension throughout sitting trial.        Assessment   Body structures, Functions, Activity limitations: Decreased functional mobility ; Decreased ROM; Decreased strength;Decreased balance;Decreased endurance;Decreased cognition;Decreased coordination  Assessment: Pt presents with decreased balance, endurance, and functional mobility related to recent CVA. Pt limited by glocal aphasia. Pt will benefit from skilled PT services to address the above stated deficits and promote safe discharge to community environment.   Treatment Diagnosis: decreased functional mobility, endurance, and balance  Prognosis: Guarded  Decision Making: Medium Complexity  Patient Education: role of PT (no evidence of learning)  Barriers to Learning: aphasia  REQUIRES PT FOLLOW UP: Yes  Activity Tolerance  Activity Tolerance: Patient limited by fatigue;Patient limited by cognitive status         Plan   Plan  Times per week: 60 minutes a day 5 days a week  Current Treatment Recommendations: Strengthening, ROM, Balance Training, Functional Mobility Training, Transfer Training, Endurance Training, Wheelchair Mobility Training, Gait Training, Stair training, Neuromuscular Re-education, Safety Education & Training, Patient/Caregiver Education & Training, Equipment Evaluation, Education, & procurement  Safety Devices  Type of devices:  All fall risk precautions in place, Bed alarm in place, Left in bed, Patient at risk for falls, Nurse notified, Call light within reach (soft touch call light)  Restraints  Initially in place: No    G-Code     OutComes Score                                           AM-PAC Score             Goals  Short term goals  Time Frame for Short term goals: 1-2 weeks  Short term goal 1: Pt will perform bed mobility maxA of 1  Short term goal 2: Pt will perform all transfers modA of 2  Short term goal 3: Pt will ambulate 10' with modA of 2 and AAD  Short term goal 4: Pt will negotiate 1 step with maxA of 2 and AAD  Short term goal 5: Propel w/c 48' with maxA  Long term goals  Time Frame for Long term goals : 2-3 weeks  Long term goal 1: Pt will perform bed mobility Diana of 1  Long term goal 2: Pt will perform all transfers modA  Long term goal 3: Pt will ambulate 48' with AAD and modA  Long term goal 4: Pt will negotiate 2 steps with AAD and maxA  Long term goal 5: Propel w/c 48' Diana  Patient Goals   Patient goals : unable to assess due to global aphasia       Therapy Time   Individual Concurrent Group Co-treatment   Time In       0830   Time Out       0930   Minutes       60      Timed Code Treatment Minutes:  45    Total Treatment Minutes:  [address] [facility] [place] [address]

## 2018-11-02 NOTE — PLAN OF CARE
Problem: PAIN  Goal: STG - Patient will increase activity level  Outcome: Ongoing  Pt unable to verbalize pain due to global aphasia. No moaning no grimacing, no guarding. Pt appears comfortable at this time with eyes closed and resp easy and even.

## 2018-11-02 NOTE — PROGRESS NOTES
all  Cognition Comment: Pt unable to answer yes/no questions, unable to follow commands        Sensation  Overall Sensation Status:  (unable to assess due to aphasia)        LUE AROM (degrees)  LUE General AROM: Unable to formally assess due to pt cognition   RUE AROM (degrees)  RUE General AROM: Unable to formally assess due to pt cognition   LUE Strength  LUE Strength Comment: Unable to formally assess due to pt cognition   RUE Strength  RUE Strength Comment: Unable to formally assess due to pt cognition      Assessment   Performance deficits / Impairments: Decreased functional mobility ; Decreased endurance;Decreased coordination;Decreased ADL status; Decreased ROM; Decreased balance;Decreased strength;Decreased safe awareness;Decreased high-level IADLs;Decreased cognition;Decreased fine motor control  Assessment: 70 y.o. female presents with the above deficits which are impacting her occupational performance. Pt currently requires minimum of assist x2 for ADLs and transfers. Treatment Diagnosis: Significant deficits in function d/t CVA  Prognosis: Fair  Decision Making: High Complexity  Patient Education: Role of OT, therapy schedule   Barriers to Learning: Aphasia   REQUIRES OT FOLLOW UP: Yes  Activity Tolerance  Activity Tolerance: Treatment limited secondary to decreased cognition  Safety Devices  Safety Devices in place: Yes  Type of devices: Bed alarm in place; Left in bed;Patient at risk for falls;Call light within reach;Nurse notified         Plan   Plan  Times per week: 60 min; 5-7x per week  Times per day: Daily  Current Treatment Recommendations: Strengthening, Patient/Caregiver Education & Training, ROM, Balance Training, Neuromuscular Re-education, Cognitive Reorientation, Endurance Training, Safety Education & Training, Self-Care / ADL, Wheelchair Mobility Training          Goals  Short term goals  Time Frame for Short term goals: 10 days   Short term goal 1: Transfer for ADL completion w/ Max

## 2018-11-03 LAB
GLUCOSE BLD-MCNC: 109 MG/DL (ref 70–99)
GLUCOSE BLD-MCNC: 112 MG/DL (ref 70–99)
GLUCOSE BLD-MCNC: 118 MG/DL (ref 70–99)
GLUCOSE BLD-MCNC: 154 MG/DL (ref 70–99)
GLUCOSE BLD-MCNC: 169 MG/DL (ref 70–99)
PERFORMED ON: ABNORMAL

## 2018-11-03 PROCEDURE — 92526 ORAL FUNCTION THERAPY: CPT

## 2018-11-03 PROCEDURE — 94640 AIRWAY INHALATION TREATMENT: CPT

## 2018-11-03 PROCEDURE — 92507 TX SP LANG VOICE COMM INDIV: CPT

## 2018-11-03 PROCEDURE — 6360000002 HC RX W HCPCS: Performed by: PHYSICAL MEDICINE & REHABILITATION

## 2018-11-03 PROCEDURE — 1280000000 HC REHAB R&B

## 2018-11-03 PROCEDURE — 97530 THERAPEUTIC ACTIVITIES: CPT

## 2018-11-03 PROCEDURE — 6370000000 HC RX 637 (ALT 250 FOR IP): Performed by: PHYSICAL MEDICINE & REHABILITATION

## 2018-11-03 PROCEDURE — 94760 N-INVAS EAR/PLS OXIMETRY 1: CPT

## 2018-11-03 RX ADMIN — FUROSEMIDE 20 MG: 20 TABLET ORAL at 09:07

## 2018-11-03 RX ADMIN — ALBUTEROL SULFATE 2.5 MG: 2.5 SOLUTION RESPIRATORY (INHALATION) at 07:40

## 2018-11-03 RX ADMIN — INSULIN LISPRO 1 UNITS: 100 INJECTION, SOLUTION INTRAVENOUS; SUBCUTANEOUS at 16:28

## 2018-11-03 RX ADMIN — ASPIRIN 325 MG: 325 TABLET, DELAYED RELEASE ORAL at 09:08

## 2018-11-03 RX ADMIN — MICONAZOLE NITRATE: 2 POWDER TOPICAL at 09:09

## 2018-11-03 RX ADMIN — LOSARTAN POTASSIUM 50 MG: 25 TABLET ORAL at 09:07

## 2018-11-03 RX ADMIN — FLUOXETINE HYDROCHLORIDE 20 MG: 20 CAPSULE ORAL at 09:08

## 2018-11-03 RX ADMIN — SENNOSIDES AND DOCUSATE SODIUM 2 TABLET: 8.6; 5 TABLET ORAL at 09:08

## 2018-11-03 RX ADMIN — TIOTROPIUM BROMIDE 18 MCG: 18 CAPSULE ORAL; RESPIRATORY (INHALATION) at 07:43

## 2018-11-03 RX ADMIN — INSULIN LISPRO 1 UNITS: 100 INJECTION, SOLUTION INTRAVENOUS; SUBCUTANEOUS at 12:11

## 2018-11-03 RX ADMIN — ENOXAPARIN SODIUM 40 MG: 100 INJECTION SUBCUTANEOUS at 23:10

## 2018-11-03 RX ADMIN — ATORVASTATIN CALCIUM 80 MG: 80 TABLET, FILM COATED ORAL at 23:10

## 2018-11-03 RX ADMIN — ALBUTEROL SULFATE 2.5 MG: 2.5 SOLUTION RESPIRATORY (INHALATION) at 18:21

## 2018-11-03 ASSESSMENT — PAIN SCALES - WONG BAKER
WONGBAKER_NUMERICALRESPONSE: 2
WONGBAKER_NUMERICALRESPONSE: 2

## 2018-11-03 NOTE — PROGRESS NOTES
multi-modalities with < mod cues. General word ID (personal/ basic orientation)   - 2/5   - using hand to cover word. Unsure if pt was able to comprehend directions. - attempted hand over hand to form pointing pattern however unsuccessful     Pt will repeat vowels,and CV bilabial speech sounds with 70% accuracy mod-max cues. Automatic   - mimic 'o' sound given max verbal and tactile stimuli    - Close approximation of \"you\" to end Happy Birthday   - counting to 10 - 0% approximations     Other areas targeted:     Education:   Unable to demonstrate comprehension of basic education provided     Safety Devices: [x] Call light within reach  [x] Chair alarm activated  [] Bed alarm activated  [] Other: [] Call light within reach  [] Chair alarm activated  [] Bed alarm activated  [] Other:      Assessment:   Speech Therapy Diagnosis  Cognitive Diagnosis: OSIRIS due to severe aphasia, will continue to assess as able. Aphasia Diagnosis: Severe expressive non-fluent aphasia and severe receptive aphasia   Communication Diagnosis: Severe due to global aphasia     Current Diet Order: DIET DYSPHAGIA II MECHANICALLY ALTERED; Nectar Thick;  No Drinking Straw   Recommended Form of Meds: Crushed in puree as able  Compensatory Swallowing Strategies: Small bites/sips, Eat/Feed slowly, Check for pocketing of food on the Right, External pacing, Remain upright for 30-45 minutes after meals, Upright as possible for all oral intake (ORAL CARE following meals)     Plan:     Frequency:  5days/week   60 minutes/day     Discharge Recommendations:   Barriers: severe communication deficits   Discharge Recommendations:  [] Home independently  [] Home with assistance []  24 hour supervision  [x] SNF [] Other:  Continued SLP Treatment:  [x] Yes [] No [] TBD based on progress while on ARU [] Vital Stim indicated [] Other:   Estimated discharge date: TBD     Type of Total Treatment Minutes   Session 1   Session 2   Time In 1000    Time Out 1030

## 2018-11-03 NOTE — PROGRESS NOTES
RLE)   Pain Assessment  Patient Currently in Pain: No  Vital Signs  Patient Currently in Pain: No   Orientation     Objective                Bed mobility  Bridging: Dependent/Total  Rolling to Left: Minimal assistance (Max Ax2)  Rolling to Right: Minimal assistance (max Ax2)  Supine to Sit: Dependent/Total (mod of two)  Sit to Supine: Dependent/Total (maxAx2)  Scooting: Dependent/Total  Comment: lateral scooting, pushes past midline, facilitated use of left ue as base of support                          Cognition  Overall Cognitive Status: Exceptions  Arousal/Alertness: Appropriate responses to stimuli  Following Commands: Does not follow commands  Attention Span: Unable to maintain attention  Safety Judgement: Decreased awareness of need for safety  Problem Solving: Decreased awareness of errors  Insights: Not aware of deficits  Initiation: Requires cues for all  Sequencing: Requires cues for all  Cognition Comment: Pt unable to answer yes/no questions, unable to follow commands     Perception  Overall Perceptual Status: Impaired  Unilateral Attention: Cues to attend to right side of body;Cues to maintain midline in sitting;Cues to attend right visual field  Initiation: Hand over hand to initiate tasks                                   Assessment   Performance deficits / Impairments: Decreased functional mobility ; Decreased endurance;Decreased coordination;Decreased ADL status; Decreased ROM; Decreased balance;Decreased strength;Decreased safe awareness;Decreased high-level IADLs;Decreased cognition;Decreased fine motor control  Assessment: 70 y.o. female presents with the above deficits which are impacting her occupational performance. Pt currently requires minimum of assist x2 for ADLs and transfers.    Treatment Diagnosis: Significant deficits in function d/t CVA  Prognosis: Fair  Patient Education: Role of OT, therapy schedule , safety with Stedy use  REQUIRES OT FOLLOW UP: Yes  Activity Tolerance  Activity

## 2018-11-03 NOTE — PROGRESS NOTES
towards HOB with Dx2. Pt left supine in bed with all needs in reach and alarm engaged. Assessment   Body structures, Functions, Activity limitations: Decreased functional mobility ; Decreased ROM; Decreased strength;Decreased balance;Decreased endurance;Decreased cognition;Decreased coordination  Assessment: Pt continues to require Max Ax2 for bed mobility and Dx2-3 for transfers and use of lift equipment for safe transfers. Pt also continues to be limited by aphasia. Pt would benefit from continued skilled PT services to address deficits.    Treatment Diagnosis: decreased functional mobility, endurance, and balance  Prognosis: Guarded  Patient Education: role of PT (no evidence of learning), forward lean for transfers (no evidence of learning)  REQUIRES PT FOLLOW UP: Yes  Activity Tolerance  Activity Tolerance: Patient limited by fatigue;Patient limited by cognitive status     G-Code     OutComes Score       AM-PAC Score             Goals  Short term goals  Time Frame for Short term goals: 1-2 weeks  Short term goal 1: Pt will perform bed mobility maxA of 1  Short term goal 2: Pt will perform all transfers modA of 2  Short term goal 3: Pt will ambulate 10' with modA of 2 and AAD  Short term goal 4: Pt will negotiate 1 step with maxA of 2 and AAD  Short term goal 5: Propel w/c 48' with maxA  Long term goals  Time Frame for Long term goals : 2-3 weeks  Long term goal 1: Pt will perform bed mobility Diana of 1  Long term goal 2: Pt will perform all transfers modA  Long term goal 3: Pt will ambulate 48' with AAD and modA  Long term goal 4: Pt will negotiate 2 steps with AAD and maxA  Long term goal 5: Propel w/c 48' Diana  Patient Goals   Patient goals : unable to assess due to global aphasia    Plan    Plan  Times per week: 60 minutes a day 5 days a week  Current Treatment Recommendations: Strengthening, ROM, Balance Training, Functional Mobility Training, Transfer Training, Endurance Training, Wheelchair Mobility Training, Gait Training, Stair training, Neuromuscular Re-education, Safety Education & Training, Patient/Caregiver Education & Training, Equipment Evaluation, Education, & procurement  Safety Devices  Type of devices:  All fall risk precautions in place, Patient at risk for falls, Nurse notified, Call light within reach, Chair alarm in place, Gait belt, Left in chair (soft touch call light in reach; RN in room )  Restraints  Initially in place: No     Therapy Time   Individual Concurrent Group Co-treatment   Time In       0830   Time Out       0900   Minutes       30        Timed Code Treatment Minutes:   30    Total Treatment Minutes:  30    Second Session Therapy Time:   Individual Concurrent Group Co-treatment   Time In       1045   Time Out       1115   Minutes       30     Timed Code Treatment Minutes:  30+30    Total Treatment Minutes:  [address], [address], [address] [facility]

## 2018-11-03 NOTE — PROGRESS NOTES
PRINCE)   Pain Assessment  Patient Currently in Pain: Yes  Pain Assessment: Faces  Bailey-Baker Pain Rating: Hurts a little bit  Vital Signs  Patient Currently in Pain: Yes   Orientation  Orientation  Overall Orientation Status:  (Unable to formally assess d/t aphasia)  Objective    ADL  Grooming: Moderate assistance  Additional Comments: Brushing teeth with wet toothbrush while in chair, cues needed for thoroness, completing 50% with set up only. Standing Balance  Sit to stand: Dependent/Total  Stand to sit: Dependent/Total  Bed mobility  Supine to Sit: Dependent/Total (Max A x2)  Sit to Supine: Dependent/Total (maxAx2)  Transfers  Sit Pivot Transfers: Dependent/Total (Max A x2 )  Sit to stand: Dependent/Total  Stand to sit: Dependent/Total  Transfer Comments: Attempted a squat pivot transfer: Dependent x2 for attempts to clear EOD< however unable. MaxAx3 and max verbal and visual cue to stand for use of the [place]. Patient demoed posterior and R lateral pushing behavior, pt unable to follow directions to self correct. Placement of arm trough             Cognition  Overall Cognitive Status: Exceptions  Arousal/Alertness: Delayed responses to stimuli  Following Commands: Does not follow commands  Attention Span: Unable to maintain attention  Safety Judgement: Decreased awareness of need for safety  Problem Solving: Decreased awareness of errors  Insights: Not aware of deficits  Initiation: Requires cues for all  Sequencing: Requires cues for all  Cognition Comment: Pt unable to answer yes/no questions, unable to follow commands            Assessment   Performance deficits / Impairments: Decreased functional mobility ; Decreased endurance;Decreased coordination;Decreased ADL status; Decreased ROM; Decreased balance;Decreased strength;Decreased safe awareness;Decreased high-level IADLs;Decreased cognition;Decreased fine motor control  Assessment: 70 y.o. female presents with the above deficits which are impacting her occupational performance. Pt currently requires minimum of assist x2 for ADLs and transfers. Treatment Diagnosis: Significant deficits in function d/t CVA  Prognosis: Fair  Patient Education: Role of OT, therapy schedule , safety with Stedy use  REQUIRES OT FOLLOW UP: Yes  Activity Tolerance  Activity Tolerance: Treatment limited secondary to decreased cognition  Safety Devices  Safety Devices in place: Yes  Type of devices: Patient at risk for falls;Call light within reach;Nurse notified; Chair alarm in place; Left in chair          Plan   Plan  Times per week: 60 min; 5-7x per week  Times per day: Daily  Current Treatment Recommendations: Strengthening, Patient/Caregiver Education & Training, ROM, Balance Training, Neuromuscular Re-education, Cognitive Reorientation, Endurance Training, Safety Education & Training, Self-Care / ADL, Wheelchair Mobility Training       Goals  Short term goals  Time Frame for Short term goals: 10 days   Short term goal 1: Transfer for ADL completion w/ Max A  Short term goal 2: UB dressing w/ Max A   Short term goal 3: Toileting w/ Max A  Short term goal 4: Bathing w/ Max A  Short term goal 5: LB dressing w/ Max A and AE as needed   Long term goals  Time Frame for Long term goals : 3 weeks  Long term goal 1: Transfer for ADL completion w/ Mod A  Long term goal 2: UB dressing w/ Mod A  Long term goal 3: Toileting w/ Mod A  Long term goal 4: Bathing w/ Mod A  Long term goal 5: LB dressing w/ Mod A and AE as needed   Patient Goals   Patient goals : Pt unable to state goals        Therapy Time   Individual Concurrent Group Co-treatment   Time In 0830         Time Out 0900         Minutes 30         Timed Code Treatment Minutes: [id number] [facility], [facility], [facility] [address]. OTR/L, [id number]

## 2018-11-04 LAB
GLUCOSE BLD-MCNC: 104 MG/DL (ref 70–99)
GLUCOSE BLD-MCNC: 110 MG/DL (ref 70–99)
GLUCOSE BLD-MCNC: 110 MG/DL (ref 70–99)
GLUCOSE BLD-MCNC: 144 MG/DL (ref 70–99)
PERFORMED ON: ABNORMAL

## 2018-11-04 PROCEDURE — 94640 AIRWAY INHALATION TREATMENT: CPT

## 2018-11-04 PROCEDURE — 94760 N-INVAS EAR/PLS OXIMETRY 1: CPT

## 2018-11-04 PROCEDURE — 6360000002 HC RX W HCPCS: Performed by: PHYSICAL MEDICINE & REHABILITATION

## 2018-11-04 PROCEDURE — 2700000000 HC OXYGEN THERAPY PER DAY

## 2018-11-04 PROCEDURE — 1280000000 HC REHAB R&B

## 2018-11-04 PROCEDURE — 6370000000 HC RX 637 (ALT 250 FOR IP): Performed by: PHYSICAL MEDICINE & REHABILITATION

## 2018-11-04 RX ORDER — ALBUTEROL SULFATE 2.5 MG/3ML
2.5 SOLUTION RESPIRATORY (INHALATION) EVERY 4 HOURS PRN
Status: DISCONTINUED | OUTPATIENT
Start: 2018-11-04 | End: 2018-11-13

## 2018-11-04 RX ADMIN — ALBUTEROL SULFATE 2.5 MG: 2.5 SOLUTION RESPIRATORY (INHALATION) at 06:21

## 2018-11-04 RX ADMIN — TIOTROPIUM BROMIDE 18 MCG: 18 CAPSULE ORAL; RESPIRATORY (INHALATION) at 06:31

## 2018-11-04 RX ADMIN — ASPIRIN 325 MG: 325 TABLET, DELAYED RELEASE ORAL at 09:04

## 2018-11-04 RX ADMIN — INSULIN LISPRO 1 UNITS: 100 INJECTION, SOLUTION INTRAVENOUS; SUBCUTANEOUS at 11:30

## 2018-11-04 RX ADMIN — FUROSEMIDE 20 MG: 20 TABLET ORAL at 09:04

## 2018-11-04 RX ADMIN — TRAMADOL HYDROCHLORIDE 50 MG: 50 TABLET, FILM COATED ORAL at 09:04

## 2018-11-04 RX ADMIN — LOSARTAN POTASSIUM 50 MG: 25 TABLET ORAL at 09:04

## 2018-11-04 RX ADMIN — FLUOXETINE HYDROCHLORIDE 20 MG: 20 CAPSULE ORAL at 09:04

## 2018-11-04 ASSESSMENT — PAIN SCALES - GENERAL
PAINLEVEL_OUTOF10: 10
PAINLEVEL_OUTOF10: 3

## 2018-11-04 NOTE — PLAN OF CARE
Problem: IP BOWEL ELIMINATION  Goal: LTG - patient will have regular and routine bowel evacuation  Outcome: Ongoing  Pt had loose med incont.  BM

## 2018-11-04 NOTE — PROGRESS NOTES
RESPIRATORY THERAPY ASSESSMENT    Name:  Jane Santana  Medical Record Number:  [id number]  Age: 70 y.o. Gender: female  : 1940  Today's Date:  2018  Room:  [place] 743508/8261-82    Assessment   Patient Admission Diagnosis      Allergies  Allergies   Allergen Reactions    Adhesive Tape      Blisters takes skin off, paper tape also causes rash    Amoxicillin-Pot Clavulanate Hives     Augmentin    Nickel Rash       Minimum Predicted Vital Capacity:     merline          Actual Vital Capacity:      merline          Pulmonary History: COPD EKATERINA  Home Oxygen Therapy:  2 liters/min via nasal cannula  Home Respiratory Therapy: Albuterol, Spiriva and Advair  Current Respiratory Therapy:  ALBUTEROL MDI PRN AND PROVENTIL HHN BID  Treatment Type: MDI  Medications: Tiotropium    Respiratory Severity Index(RSI)   Patients with orders for inhalation medications, oxygen, or any therapeutic treatment modality will be placed on Respiratory Protocol. They will be assessed with the first treatment and at least every 72 hours thereafter. The following severity scale will be used to determine frequency of treatment intervention.     Smoking History: Pulmonary Disease or Smoking History, Greater than 15 pack year = 2    Social History  Social History   Substance Use Topics    Smoking status: Former Smoker     Packs/day: 1.00     Years: 30.00     Types: Cigarettes     Quit date: 1988    Smokeless tobacco: Former User      Comment: quit date approximate    Alcohol use No      Comment: less than once a week       Recent Surgical History: None = 0  Past Surgical History  Past Surgical History:   Procedure Laterality Date    ABDOMEN SURGERY      CHOLECYSTECTOMY      robotic    COLONOSCOPY      polyps removed    EYE SURGERY      sanju cataracts removed    HYSTERECTOMY      robotic    JOINT REPLACEMENT  2016    RIGHT tkr    SKIN BIOPSY      UMBILICAL HERNIA REPAIR      OPEN UMBILICAL HERNIA REPAIR Level of Consciousness: Alert, Follows Commands but Disoriented = 1    Level of Activity: Walking with assistance = 1    Respiratory Pattern: Regular Pattern; RR 8-20 = 0    Breath Sounds: Clear = 0    Sputum   ,  , Sputum How Obtained: Cough on request  Cough: Strong, spontaneous, non-productive = 0    Vital Signs   /78   Pulse 88   Temp 97.8 °F (36.6 °C) (Axillary)   Resp 18   Ht 5' 2\" (1.575 m)   Wt 234 lb 3.2 oz (106.2 kg)   SpO2 92%   BMI 42.84 kg/m²   SPO2 (COPD values may differ): 90-91% on room air or greater than 92% on FiO2 24- 28% = 1    Peak Flow (asthma only): not applicable = 0    RSI: 5-6 = Q4hr PRN (every four hours as needed) for dyspnea        Plan       Goals: medication delivery, mobilize retained secretions, volume expansion and improve oxygenation  Plan of Care: albuterol prn     Patient/caregiver was educated on the proper method of use of Respiratory Therapy device:  Yes      Level of understanding, patient and caregiver was able to:(check appropriate box(es))   [] Verbalize understanding   [] Demonstrate understanding       [] Teach back        [] Needs reinforcement       []  No available caregiver               []  Other:     Response to education:  Excellent     Teaching Time:  5  minutes      Is patient being placed on Home Treatment Regimen? No     Electronically signed by Hua Reyes RCP on 11/4/2018 at 4:27 PM    Respiratory Protocol Guidelines     1. Assessment and treatment by Respiratory Therapy will be initiated for medication and therapeutic interventions upon initiation of aerosolized medication. 2. Physician will be contacted for respiratory rate (RR) greater than 35 breaths per minute. Therapy will be held for heart rate (HR) greater than 140 beats per minute, pending direction from physician. 3. Bronchodilators will be administered via Metered Dose Inhaler (MDI) with spacer when the following criteria are met:  a.  Alert and cooperative     b. HR < 140 bpm  c. RR < 30 bpm                d. Can demonstrate a 2-3 second inspiratory hold  4. Bronchodilators will be administered via Hand Held Nebulizer [facility]) to patients when ANY of the following criteria are met  a. Incognizant or uncooperative          b. Patients treated with HHN at Home        c. Unable to demonstrate proper MDI or HFA technique     d. RR > 30 bpm   5. Bronchodilators will be delivered via Metered Dose Inhaler (MDI) or Hydrofluoroalkane (HFA) with spacer to intubated patients on mechanical ventilation. 6. Inhalation medication orders will be delivered and/or substituted as outlined below. Aerosolized Medications Ordering and Administration Guidelines:    1. All Medications will be ordered by a physician, and their frequency and/or modality will be adjusted as defined by the patients Respiratory Severity Index (RSI) score. 2. If the patient does not have documented COPD, consider discontinuing anticholinergics when RSI is less than 9.  3. If the bronchospasm worsens (increased RSI), then the bronchodilator frequency can be increased to a maximum of every 4 hours. If greater than every 4 hours is required, the physician will be contacted. 4. If the bronchospasm improves, the frequency of the bronchodilator can be decreased, based on the patient's RSI, but not less than home treatment regimen frequency. 5. Bronchodilator(s) will be discontinued if patient has a RSI less than 9 and has received no scheduled or as needed treatment for 72  Hrs. Patients Ordered on a Mucolytic Agent:    1. Must always be administered with a bronchodilator. 2. Discontinue if patient experiences worsened bronchospasm, or secretions have lessened to the point that the patient is able to clear them with a cough. Anti-inflammatory and Combination Medications:    1.  If the patient lacks prior history of lung disease, is not using inhaled anti-inflammatory medication at home, and lacks wheezing by examination or

## 2018-11-05 LAB
ANION GAP SERPL CALCULATED.3IONS-SCNC: 10 MMOL/L (ref 3–16)
BUN BLDV-MCNC: 27 MG/DL (ref 7–20)
CALCIUM SERPL-MCNC: 8.8 MG/DL (ref 8.3–10.6)
CHLORIDE BLD-SCNC: 107 MMOL/L (ref 99–110)
CO2: 24 MMOL/L (ref 21–32)
CREAT SERPL-MCNC: 0.7 MG/DL (ref 0.6–1.2)
GFR AFRICAN AMERICAN: >60
GFR NON-AFRICAN AMERICAN: >60
GLUCOSE BLD-MCNC: 102 MG/DL (ref 70–99)
GLUCOSE BLD-MCNC: 114 MG/DL (ref 70–99)
HCT VFR BLD CALC: 43.3 % (ref 36–48)
HEMOGLOBIN: 14.6 G/DL (ref 12–16)
MCH RBC QN AUTO: 31 PG (ref 26–34)
MCHC RBC AUTO-ENTMCNC: 33.8 G/DL (ref 31–36)
MCV RBC AUTO: 91.6 FL (ref 80–100)
PDW BLD-RTO: 14.9 % (ref 12.4–15.4)
PERFORMED ON: ABNORMAL
PLATELET # BLD: 210 K/UL (ref 135–450)
PMV BLD AUTO: 9 FL (ref 5–10.5)
POTASSIUM SERPL-SCNC: 4.2 MMOL/L (ref 3.5–5.1)
RBC # BLD: 4.73 M/UL (ref 4–5.2)
SODIUM BLD-SCNC: 141 MMOL/L (ref 136–145)
WBC # BLD: 11 K/UL (ref 4–11)

## 2018-11-05 PROCEDURE — 97535 SELF CARE MNGMENT TRAINING: CPT

## 2018-11-05 PROCEDURE — 97530 THERAPEUTIC ACTIVITIES: CPT

## 2018-11-05 PROCEDURE — 92507 TX SP LANG VOICE COMM INDIV: CPT

## 2018-11-05 PROCEDURE — 80048 BASIC METABOLIC PNL TOTAL CA: CPT

## 2018-11-05 PROCEDURE — 36415 COLL VENOUS BLD VENIPUNCTURE: CPT

## 2018-11-05 PROCEDURE — 6370000000 HC RX 637 (ALT 250 FOR IP): Performed by: PHYSICAL MEDICINE & REHABILITATION

## 2018-11-05 PROCEDURE — 97112 NEUROMUSCULAR REEDUCATION: CPT

## 2018-11-05 PROCEDURE — 92526 ORAL FUNCTION THERAPY: CPT

## 2018-11-05 PROCEDURE — 94640 AIRWAY INHALATION TREATMENT: CPT

## 2018-11-05 PROCEDURE — 6360000002 HC RX W HCPCS: Performed by: PHYSICAL MEDICINE & REHABILITATION

## 2018-11-05 PROCEDURE — 1280000000 HC REHAB R&B

## 2018-11-05 PROCEDURE — 85027 COMPLETE CBC AUTOMATED: CPT

## 2018-11-05 RX ADMIN — ENOXAPARIN SODIUM 40 MG: 100 INJECTION SUBCUTANEOUS at 19:44

## 2018-11-05 RX ADMIN — TIOTROPIUM BROMIDE 18 MCG: 18 CAPSULE ORAL; RESPIRATORY (INHALATION) at 06:00

## 2018-11-05 RX ADMIN — FUROSEMIDE 20 MG: 20 TABLET ORAL at 08:31

## 2018-11-05 RX ADMIN — LOSARTAN POTASSIUM 50 MG: 25 TABLET ORAL at 08:31

## 2018-11-05 RX ADMIN — ASPIRIN 325 MG: 325 TABLET, DELAYED RELEASE ORAL at 08:31

## 2018-11-05 RX ADMIN — ENOXAPARIN SODIUM 40 MG: 100 INJECTION SUBCUTANEOUS at 00:09

## 2018-11-05 RX ADMIN — ATORVASTATIN CALCIUM 80 MG: 80 TABLET, FILM COATED ORAL at 00:09

## 2018-11-05 RX ADMIN — FLUOXETINE HYDROCHLORIDE 20 MG: 20 CAPSULE ORAL at 08:31

## 2018-11-05 RX ADMIN — SENNOSIDES AND DOCUSATE SODIUM 2 TABLET: 8.6; 5 TABLET ORAL at 19:44

## 2018-11-05 RX ADMIN — SENNOSIDES AND DOCUSATE SODIUM 2 TABLET: 8.6; 5 TABLET ORAL at 08:31

## 2018-11-05 RX ADMIN — MICONAZOLE NITRATE: 2 POWDER TOPICAL at 19:46

## 2018-11-05 RX ADMIN — ATORVASTATIN CALCIUM 80 MG: 80 TABLET, FILM COATED ORAL at 19:44

## 2018-11-05 RX ADMIN — MICONAZOLE NITRATE: 2 POWDER TOPICAL at 00:09

## 2018-11-05 RX ADMIN — MICONAZOLE NITRATE: 2 POWDER TOPICAL at 12:31

## 2018-11-05 ASSESSMENT — PAIN SCALES - GENERAL
PAINLEVEL_OUTOF10: 0

## 2018-11-05 NOTE — PROGRESS NOTES
Signs  Patient Currently in Pain: Denies      Objective    ADL  Grooming: Moderate assistance (to brush hair while seated in recliner)  UE Bathing: Dependent/Total  LE Bathing: Dependent/Total  UE Dressing: Dependent/Total  LE Dressing: Dependent/Total  Toileting: None  Additional Comments: Pt assisted to EOB to complete ADLs. Max A for sitting balance (pt continues to demo pushing behavior w/ LUE) while this writer directed ADL. Pt requires HOHA to attend to R side. Pt completed UB/LB bathing/dressing at EOB. Balance  Sitting Balance: Dependent/Total (Varied assist from Max A-Dependent. Pt continues to demo pushing behavior w/ LUE)  Standing Balance: Dependent/Total (Assist x3 at stedy)  Standing Balance  Sit to stand: Dependent/Total  Stand to sit: Dependent/Total  Comment: Assist x3 at stedy    Bed mobility  Supine to Sit: Dependent/Total (Max A x2)  Scooting: Dependent/Total    Transfers  Sit to stand: Dependent/Total  Stand to sit: Dependent/Total          Additional Activity: Pt completed fine motor tasks while seated unsupported in recliner. Pt required Max A for sitting balance throughout. Pt demoed poor activity tolerance, only able to sit unsupported for ~2 min prior to requiring a breaks. Pt sat unsupported for ~10 min total.      Assessment   Performance deficits / Impairments: Decreased functional mobility ; Decreased endurance;Decreased coordination;Decreased ADL status; Decreased ROM; Decreased balance;Decreased strength;Decreased safe awareness;Decreased high-level IADLs;Decreased cognition;Decreased fine motor control  Assessment: 70 y.o. female presents with the above deficits which are impacting her occupational performance. Pt currently requires minimum of assist x2 for ADLs and transfers.    Treatment Diagnosis: Significant deficits in function d/t CVA  Patient Education: Role of OT, therapy schedule , safety with Stedy use  REQUIRES OT FOLLOW UP: Yes  Activity Tolerance  Activity Tolerance:

## 2018-11-05 NOTE — PROGRESS NOTES
arrival.  Unable to assess pain due to aphasia. Orientation     Cognition      Objective   Bed mobility  Rolling to Left: Dependent/Total  Supine to Sit: Dependent/Total (maxA of 2)  Scooting: Dependent/Total  Transfers  Sit to Stand: Dependent/Total (totalA of 3 with stedy)  Stand to sit: Dependent/Total (totalA of 3 with stedy)  Stand Pivot Transfers: Dependent/Total (totalA of 3 with stedy)        Balance  Posture: Poor  Sitting - Static: Poor  Sitting - Dynamic: Poor  Standing - Static: Poor  Standing - Dynamic: Poor  Comments: TotalA sitting EOB for washing with pt demonstrating R and posterior push. Push decreased with LUE on lap pal, up. TotalA sitting in recliner to facilitate forward lean and to maintain midline for reaching activities with pt demonstrating less push to the R and posterior but still leaning R and posteriorly. Comment: Performed bed mobility and transfers as mentioned above. Pt continues to demonstrate flexion synergy pattern with RLE. Performed balance exercises as mentioned above reaching for cones 2x4, tossing sensory ball, playing connect 4, and tic tac toe. Pt requiring rest breaks throughout due to fatigue. Pt remained in chair with alarm on and all needs in reach. Assessment   Body structures, Functions, Activity limitations: Decreased functional mobility ; Decreased ROM; Decreased strength;Decreased balance;Decreased endurance;Decreased cognition;Decreased coordination  Assessment: Pt continues to be dependent for bed mobility and transfers. Pt demonstrating decreased R and posterior pushing when performing dynamic sitting exercises, however continues to demonstrate a R and posterior lean.   Treatment Diagnosis: decreased functional mobility, endurance, and balance  Prognosis: Guarded  REQUIRES PT FOLLOW UP: Yes  Activity Tolerance  Activity Tolerance: Patient limited by fatigue;Patient limited by cognitive status     G-Code     OutComes Score

## 2018-11-05 NOTE — PROGRESS NOTES
Pt unable to put own mask on. Asked pt to show me that she can remove mask on her own and she could not. Spoke with nurse about home unit. Patient does indeed need help with her unit.

## 2018-11-05 NOTE — PROGRESS NOTES
ACUTE REHAB UNIT  SPEECH/LANGUAGE PATHOLOGY      [x] Daily  [] Weekly Care Conference Note  [] Discharge    Patient:Magy Ambrose     VRW:[birth year]  M:[id number]  Room #: BUT-0877/7801-73   Rehab Dx/Hx: Acute ischemic stroke [facility]) [I63.9]  Date of Admit: 11/1/2018      Precautions: falls  Home situation: Per chart review, pt lives at home with spouse. ST Dx: Severe expressive non-fluent aphasia and severe receptive aphasia, Oropharyngeal Dysphagia, Oral Apraxia     Daily Treatment Info:   Date: 11/5/2018   Tx session 1 Tx session 2 Tx session 3   Pain Pt pointed to \"no\" when SLP asked if pt was in pain. Pt appeared pleasant,  With written question provided with y/n written response pt indicated she was in pain. With use of faces rating scale pt pointed to Mild (1-3), unable to indicate location on body or picture image. No facial grimacing noted. Pt indicated she was in pain provided with written choices. Pt unable to rate or locate using Faces rating scale. Pt appeared comfortable, no facial grimacing noted. Subjective     Pt found lying upright in bed following breakfast meal. Per RN pt consumed ~75% with no noted difficulty. Pt currently on 1 L of O2 via NC. Per RN pt was 88% on RA this AM, placed on 1L with stats coming up to 94%. Pt alert and pleasant during session. Pt found lying in bed slouched down. Pt repositioned upright in bed with assistance from another SLP. Pt alert and cooperative throughout. Pt found alert lying back in bed. Placed HOB as far upright as possible for po intake. Pt alert and pleasant. Objective:  Goals      Pt will tolerate trials of soft regular solids with no overt s/s of aspiration, significant pocketing on R or anterior bolus loss our of R side. Goal not targeted this session. Pt produced congested productive cough expelling yellowish phlegm from mouth. Goal not targeted this session.     Softened solids (suzanne cracker in pudding)- pt tolerated with no overt s/s of aspiration and effective oral clearance     Fleet Medico cracker- pt tolerated 1.5 with no overt s/s of aspiration with effective oral clearance     Pt will tolerate thin liquids in isolation and in combination with other textures with no overt s/s of aspiration. Goal not targeted this session. Pt denied wanting anything to drink. Thins via cup in isolation (pt controlling sip size/ bolus rate)- pt tolerated 16 oz of thin water producing a delayed throat clear, occasionally pt would take large successive drinks   Thins via cup in isolation 4 oz of 2% milk- pt tolerated with no overt s/s of aspiration    Pt will answer basic yes/no questions using any modality with 70% accuracy mod cues. Basic/ Relevant Y/N questions-   - using written responses with pt pointing- 0/3 (unable to tell if pt was pointing to answer or covering up the other word), inconsistent, attempted thumbs up/ thumbs down system pt unable to produce thumbs down with model  - Unable to shake head y/ n or verbalize with max cues  - Pt with no response for x 1 question, instead blowing kisses with hand    Goal not targeted this session. Pt said 'yeah' throughout session but inconsistent/ unable to check for reliability. Pt attempting to say 'no' holding /o/ out     Basic/ Relevant Y/N questions-   - using written responses with pt pointing- 2/7 (inconsistent with pt primarily pointing to 'no')        Patient will increase object/picture identification to > 86% accuracy, field of 2. Goal not targeted this session. Picture ID- f/2 6/11 (55%)  Object to word ID f/2- 5/8 (63%)    Pt will communicate basic wants/ needs utilizing multi-modalities with < mod cues. Unable to tell if pt wanted TV on/ off. Pt indicated she wasn't in pain via pointing to written 'no' response. Able to indicate not wanting a drink, unsure of overall accuracy.    Pt able to communicate wanting a drink provided with written f/2 (using back of hand pt pointed to water provided with written f/3). Pt able to indicate wanting more to drink pointing to written choices. Attempted to see if pt had her reading glasses present. Pt pointed to 'yes' unable to indicate where they were. Pt able to produce 'thumbs up' throughout session when SLP asking if pt was ok, required model. Pt indicated she wanted something to eat and drink with written choices. With f/3 written choices pt indicated she wanted milk. Able to indicate she liked suzanne crackers and pudding with written choices. Pt able to indicate wanting TV off and lights on with written choices and making sounds/ shaking head when SLP turned TV on. Pt will repeat vowels,and CV bilabial speech sounds with 70% accuracy mod-max cues. Vowels- pt able to produce 'o' Independently, unable to produce other vowels with max cues  Goal not targeted this session. Vowels- pt able to produce 'o' Independently, unable to produce other vowels with max cues     M+ vowel- 0/5 max cues     Pt will complete one-step commands with 70% acc, with < max cues. Goal not targeted this session. Goal not targeted this session. Pt able to protrude tongue provided with functional command and visual cue. Other areas targeted: Automatics with melodic intonation and tapping- SG/ counting, pt did not attempt approximations despite max cues    Predictable phrase completions- 1/3 pointing to written responses f/2 (via pointing/ covering up response ?  Or using marker to deborah which response she picked)     Automatics with melodic intonation and tapping- ABC's (gross approximation to 'p' and 'me'), 'Happy Birthday' gross approximation ~10%, 'Twinkle Twinkle Little Star' ~5% gross approximation (pt smiling throughout vs attempting to produce words/ sounds)     4 oz of pudding- pt consumed with no overt s/s of aspiration, anterior bolus loss out of R side x 1, reduced awareness     Education:   Unable to demonstrate comprehension of basic education provided throughout session. Provided verbal information to pt re: aphasia, unable to assess comprehension of information, suspect limited. Left written handouts for pt's family to explain aphasia. Left sheet for family to fill out in order to utilize in treatment as well as informed family to bring in family photos with names on the back to utilize them in treatment. Unable to demonstrate comprehension of basic education provided throughout session. Safety Devices: [x] Call light within reach  [] Chair alarm activated  [x] Bed alarm activated  [] Other: [x] Call light within reach  [] Chair alarm activated  [x] Bed alarm activated  [] Other:  [x] Call light within reach  [] Chair alarm activated  [x] Bed alarm activated  [x] Other: Left in presence of RN to assist with Lunch meal.      Assessment:   Speech Therapy Diagnosis  Cognitive Diagnosis: OSIRIS due to severe aphasia, will continue to assess as able. Aphasia Diagnosis: Severe expressive non-fluent aphasia and severe receptive aphasia   Communication Diagnosis: Severe due to global aphasia     Current Diet Order: DIET DYSPHAGIA II MECHANICALLY ALTERED; Nectar Thick; No Drinking Straw   Recommended Form of Meds: Crushed in puree as able *TOTAL FEED due to impulsivity and reduced sensation on R side of mouth. *  Compensatory Swallowing Strategies: Small bites/sips, Eat/Feed slowly, Check for pocketing of food on the Right, External pacing, Remain upright for 30-45 minutes after meals, Upright as possible for all oral intake (ORAL CARE following meals)     Plan:     Frequency:  5days/week   60 minutes/day     Discharge Recommendations:   Barriers: severe communication deficits   Discharge Recommendations:  [] Home independently  [] Home with assistance []  24 hour supervision  [x] SNF [] Other:  Continued SLP Treatment:  [x] Yes [] No [] TBD based on progress while on ARU [] Vital Stim indicated [] Other:   Estimated discharge date: TBD Type of Total Treatment Minutes   Session 1   Session 2 Session 3   Time In 0830 1000 1120   Time Out 0900 1030 1150   Timed Code Minutes  0 0 0   Individual Treatment Minutes  30 30 30   Co-Treatment Minutes       Group Treatment Minutes       Concurrent Treatment Minutes         TOTAL DAILY MINUTES:  90 (made up missed minutes from over the weekend)       Electronically Signed by     Jenifer Mchugh. A.  CCC-SLP #[id number] 11/5/2018 7:39 AM  Speech-Language Pathologist

## 2018-11-06 PROCEDURE — 92526 ORAL FUNCTION THERAPY: CPT

## 2018-11-06 PROCEDURE — 97530 THERAPEUTIC ACTIVITIES: CPT

## 2018-11-06 PROCEDURE — 94660 CPAP INITIATION&MGMT: CPT

## 2018-11-06 PROCEDURE — 94760 N-INVAS EAR/PLS OXIMETRY 1: CPT

## 2018-11-06 PROCEDURE — 6370000000 HC RX 637 (ALT 250 FOR IP): Performed by: PHYSICAL MEDICINE & REHABILITATION

## 2018-11-06 PROCEDURE — 6360000002 HC RX W HCPCS: Performed by: PHYSICAL MEDICINE & REHABILITATION

## 2018-11-06 PROCEDURE — 1280000000 HC REHAB R&B

## 2018-11-06 PROCEDURE — 97535 SELF CARE MNGMENT TRAINING: CPT

## 2018-11-06 PROCEDURE — 97112 NEUROMUSCULAR REEDUCATION: CPT

## 2018-11-06 PROCEDURE — 92507 TX SP LANG VOICE COMM INDIV: CPT

## 2018-11-06 RX ORDER — METHYLPHENIDATE HYDROCHLORIDE 10 MG/1
5 TABLET ORAL 2 TIMES DAILY
Status: DISCONTINUED | OUTPATIENT
Start: 2018-11-06 | End: 2018-11-12

## 2018-11-06 RX ORDER — ACETAMINOPHEN 80 MG
TABLET,CHEWABLE ORAL
Status: DISCONTINUED
Start: 2018-11-06 | End: 2018-11-07

## 2018-11-06 RX ADMIN — METHYLPHENIDATE HYDROCHLORIDE 5 MG: 10 TABLET ORAL at 09:29

## 2018-11-06 RX ADMIN — MICONAZOLE NITRATE: 2 POWDER TOPICAL at 20:35

## 2018-11-06 RX ADMIN — ENOXAPARIN SODIUM 40 MG: 100 INJECTION SUBCUTANEOUS at 20:36

## 2018-11-06 RX ADMIN — LOSARTAN POTASSIUM 50 MG: 25 TABLET ORAL at 09:29

## 2018-11-06 RX ADMIN — SENNOSIDES AND DOCUSATE SODIUM 2 TABLET: 8.6; 5 TABLET ORAL at 09:29

## 2018-11-06 RX ADMIN — FUROSEMIDE 20 MG: 20 TABLET ORAL at 09:29

## 2018-11-06 RX ADMIN — ATORVASTATIN CALCIUM 80 MG: 80 TABLET, FILM COATED ORAL at 20:36

## 2018-11-06 RX ADMIN — ASPIRIN 325 MG: 325 TABLET, DELAYED RELEASE ORAL at 09:29

## 2018-11-06 RX ADMIN — METHYLPHENIDATE HYDROCHLORIDE 5 MG: 10 TABLET ORAL at 14:37

## 2018-11-06 RX ADMIN — MICONAZOLE NITRATE: 2 POWDER TOPICAL at 09:33

## 2018-11-06 RX ADMIN — FLUOXETINE HYDROCHLORIDE 20 MG: 20 CAPSULE ORAL at 09:29

## 2018-11-06 ASSESSMENT — PAIN SCALES - GENERAL
PAINLEVEL_OUTOF10: 0
PAINLEVEL_OUTOF10: 0

## 2018-11-06 NOTE — PROGRESS NOTES
Physical Therapy  Facility/Department: [facility] ACUTE REHAB UNIT  Daily Treatment Note  NAME: Orlando Silva  :   MRN: [id number]    Date of Service: 2018    Discharge Recommendations:  Continue to assess pending progress, Patient would benefit from continued therapy after discharge   PT Equipment Recommendations  Equipment Needed: Yes  Other: TBD with progress    Patient Diagnosis(es): There were no encounter diagnoses. has a past medical history of Acute CVA (cerebrovascular accident) ([facility] [address] [facility] ([facility]) ([facility] Arthritis; Cancer [facility]); COPD (chronic obstructive pulmonary disease) ([facility] GERD (gastroesophageal reflux disease); Hyperlipidemia; Hypertension; Impaired glucose tolerance; Pneumonia; PONV (postoperative nausea and vomiting); Pulmonary emboli ([facility] and Sleep apnea. has a past surgical history that includes Colonoscopy; Abdomen surgery; eye surgery; skin biopsy; Hysterectomy (); Cholecystectomy; joint replacement (); and Umbilical hernia repair (2018). Restrictions  Restrictions/Precautions  Restrictions/Precautions: Fall Risk, Swallowing - Thickened Liquids  Required Braces or Orthoses?: No  Position Activity Restriction  Other position/activity restrictions: Jonh Smith is a 70 y.o. female admitted to [facility] on 10/27 after being found on the floor unconscious in home. Per documentation, EMS noted incontinence and facial droop as well as gross right sided flaccidy, non-verbal but conscious. Has a history of TIA per , and is on plavix. 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 Pt given tPA at triage, transferred to [facility] for possible thrombectomy, unable to complete due to anatomy. Subjective   General  Chart Reviewed: Yes  Additional Pertinent Hx: COPD, HTN, cancer, joint replacement  Response To Previous Treatment: Patient with no complaints from previous session.   Family / Caregiver Present: No  Subjective  Subjective: Pt sitting in recliner on

## 2018-11-07 PROCEDURE — 97530 THERAPEUTIC ACTIVITIES: CPT

## 2018-11-07 PROCEDURE — 6360000002 HC RX W HCPCS: Performed by: PHYSICAL MEDICINE & REHABILITATION

## 2018-11-07 PROCEDURE — 6370000000 HC RX 637 (ALT 250 FOR IP): Performed by: PHYSICAL MEDICINE & REHABILITATION

## 2018-11-07 PROCEDURE — 94760 N-INVAS EAR/PLS OXIMETRY 1: CPT

## 2018-11-07 PROCEDURE — 94640 AIRWAY INHALATION TREATMENT: CPT

## 2018-11-07 PROCEDURE — 1280000000 HC REHAB R&B

## 2018-11-07 PROCEDURE — 2700000000 HC OXYGEN THERAPY PER DAY

## 2018-11-07 PROCEDURE — 97535 SELF CARE MNGMENT TRAINING: CPT

## 2018-11-07 PROCEDURE — 92526 ORAL FUNCTION THERAPY: CPT

## 2018-11-07 PROCEDURE — 92507 TX SP LANG VOICE COMM INDIV: CPT

## 2018-11-07 RX ADMIN — LOSARTAN POTASSIUM 50 MG: 25 TABLET ORAL at 09:57

## 2018-11-07 RX ADMIN — FLUOXETINE HYDROCHLORIDE 20 MG: 20 CAPSULE ORAL at 09:57

## 2018-11-07 RX ADMIN — MICONAZOLE NITRATE: 2 POWDER TOPICAL at 21:17

## 2018-11-07 RX ADMIN — ENOXAPARIN SODIUM 40 MG: 100 INJECTION SUBCUTANEOUS at 21:16

## 2018-11-07 RX ADMIN — SENNOSIDES AND DOCUSATE SODIUM 2 TABLET: 8.6; 5 TABLET ORAL at 21:16

## 2018-11-07 RX ADMIN — TIOTROPIUM BROMIDE 18 MCG: 18 CAPSULE ORAL; RESPIRATORY (INHALATION) at 05:56

## 2018-11-07 RX ADMIN — METHYLPHENIDATE HYDROCHLORIDE 5 MG: 10 TABLET ORAL at 09:58

## 2018-11-07 RX ADMIN — SENNOSIDES AND DOCUSATE SODIUM 2 TABLET: 8.6; 5 TABLET ORAL at 09:58

## 2018-11-07 RX ADMIN — ATORVASTATIN CALCIUM 80 MG: 80 TABLET, FILM COATED ORAL at 21:16

## 2018-11-07 RX ADMIN — ASPIRIN 325 MG: 325 TABLET, DELAYED RELEASE ORAL at 09:57

## 2018-11-07 RX ADMIN — FUROSEMIDE 20 MG: 20 TABLET ORAL at 09:45

## 2018-11-07 RX ADMIN — METHYLPHENIDATE HYDROCHLORIDE 5 MG: 10 TABLET ORAL at 14:19

## 2018-11-07 RX ADMIN — MICONAZOLE NITRATE: 2 POWDER TOPICAL at 14:21

## 2018-11-07 ASSESSMENT — PAIN SCALES - GENERAL: PAINLEVEL_OUTOF10: 0

## 2018-11-07 NOTE — PROGRESS NOTES
Occupational Therapy  Facility/Department: [facility] ACUTE REHAB UNIT  Daily Treatment Note  NAME: Uzma Glover  :   MRN: [id number]    Date of Service: 2018    Discharge Recommendations:  Continue to assess pending progress       Patient Diagnosis(es): There were no encounter diagnoses. has a past medical history of Acute CVA (cerebrovascular accident) ([facility] [address] [facility]) ([facility] Arthritis; Cancer [facility]); COPD (chronic obstructive pulmonary disease) ([facility] GERD (gastroesophageal reflux disease); Hyperlipidemia; Hypertension; Impaired glucose tolerance; Pneumonia; PONV (postoperative nausea and vomiting); Pulmonary emboli ([facility] and Sleep apnea. has a past surgical history that includes Colonoscopy; Abdomen surgery; eye surgery; skin biopsy; Hysterectomy (); Cholecystectomy; joint replacement (); and Umbilical hernia repair (2018). Restrictions  Restrictions/Precautions  Restrictions/Precautions: Fall Risk, Swallowing - Thickened Liquids  Required Braces or Orthoses?: No  Position Activity Restriction  Other position/activity restrictions: Arash Gloria is a 70 y.o. female admitted to [facility] on 10/27 after being found on the floor unconscious in home. Per documentation, EMS noted incontinence and facial droop as well as gross right sided flaccidy, non-verbal but conscious. Has a history of TIA per , and is on plavix. 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 Pt given tPA at triage, transferred to [facility] for possible thrombectomy, unable to complete due to anatomy. Subjective   General  Chart Reviewed: Yes  Patient assessed for rehabilitation services?: Yes  Response to previous treatment: Patient with no complaints from previous session  Family / Caregiver Present: No  Diagnosis: CVA  Subjective  Subjective: Pt supine in bed upon entry, alert.  Increased command following noted this session   Pain Assessment  Patient Currently in Pain: Denies (however pt may be stimuli  Following Commands: Follows one step commands with increased time  Attention Span: Difficulty dividing attention; Difficulty attending to directions  Initiation: Requires cues for all  Sequencing: Requires cues for all  Cognition Comment: Pt continues to be inconsistent w/ yes/no answers       Addendum [id number]: Pt seated in w/c upon entry, gesturing to bed. Pt assisted w/ weight shifting to place prosper pads. Maxi eugene utilized to completed w/c to bed transfer. Pt incontinent of urine. Mod A to roll to R x2, Max A to roll to L x2 to assist w/ pericare and placement of brief. Pt's R arm propped up on pillow. Multi-podus boot applied. Pt supine in bed, 2L O2 placed, bed alarm on, needs within reach. Assessment   Performance deficits / Impairments: Decreased functional mobility ; Decreased endurance;Decreased coordination;Decreased ADL status; Decreased ROM; Decreased balance;Decreased strength;Decreased safe awareness;Decreased high-level IADLs;Decreased cognition;Decreased fine motor control  Assessment: Pt demos increased command following this session. Pt continues to require assist x2 for all transfers/ADL completion. Patient Education: safety with Stedy use, attention to R side   Activity Tolerance  Activity Tolerance: Patient Tolerated treatment well  Safety Devices  Safety Devices in place: Yes  Type of devices: Patient at risk for falls;Call light within reach;Nurse notified; Bed alarm in place; Left in bed (Pt left in w/c, hooked up to room O2, arm trough in place )          Plan   Plan  Times per week: 60 min; 5-7x per week  Times per day: Daily  Current Treatment Recommendations: Strengthening, Patient/Caregiver Education & Training, ROM, Balance Training, Neuromuscular Re-education, Cognitive Reorientation, Endurance Training, Safety Education & Training, Self-Care / ADL, Wheelchair Mobility Training       Goals  Short term goals  Time Frame for Short term goals: 10 days   Short term goal 1: Transfer for ADL completion w/ Max A  Short term goal 2: UB dressing w/ Max A   Short term goal 3: Toileting w/ Max A  Short term goal 4: Bathing w/ Max A  Short term goal 5: LB dressing w/ Max A and AE as needed   Long term goals  Time Frame for Long term goals : 3 weeks  Long term goal 1: Transfer for ADL completion w/ Mod A  Long term goal 2: UB dressing w/ Mod A  Long term goal 3: Toileting w/ Mod A  Long term goal 4: Bathing w/ Mod A  Long term goal 5: LB dressing w/ Mod A and AE as needed   Patient Goals   Patient goals : Pt unable to state goals        Therapy Time   Individual Concurrent Group Co-treatment   Time In       1000   Time Out       1100   Minutes       60        Timed Code Treatment Minutes:   60    Second Session Therapy Time:   Individual Concurrent Group Co-treatment   Time In 1664      Time Out 1422      Minutes 17        Timed Code Treatment Minutes:  60 + 17     Total Treatment Minutes:  77 minutes         Brian Lazar, CHRISTOPHER Lazar OTR/L, [address] [facility] #[id number]

## 2018-11-08 LAB
ANION GAP SERPL CALCULATED.3IONS-SCNC: 10 MMOL/L (ref 3–16)
BUN BLDV-MCNC: 21 MG/DL (ref 7–20)
CALCIUM SERPL-MCNC: 8.1 MG/DL (ref 8.3–10.6)
CHLORIDE BLD-SCNC: 105 MMOL/L (ref 99–110)
CO2: 26 MMOL/L (ref 21–32)
CREAT SERPL-MCNC: 0.7 MG/DL (ref 0.6–1.2)
GFR AFRICAN AMERICAN: >60
GFR NON-AFRICAN AMERICAN: >60
GLUCOSE BLD-MCNC: 132 MG/DL (ref 70–99)
GLUCOSE BLD-MCNC: 143 MG/DL (ref 70–99)
HCT VFR BLD CALC: 40.4 % (ref 36–48)
HEMOGLOBIN: 13.6 G/DL (ref 12–16)
MCH RBC QN AUTO: 30.9 PG (ref 26–34)
MCHC RBC AUTO-ENTMCNC: 33.7 G/DL (ref 31–36)
MCV RBC AUTO: 91.9 FL (ref 80–100)
PDW BLD-RTO: 15 % (ref 12.4–15.4)
PERFORMED ON: ABNORMAL
PLATELET # BLD: 215 K/UL (ref 135–450)
PMV BLD AUTO: 9 FL (ref 5–10.5)
POTASSIUM SERPL-SCNC: 4.3 MMOL/L (ref 3.5–5.1)
RBC # BLD: 4.4 M/UL (ref 4–5.2)
SODIUM BLD-SCNC: 141 MMOL/L (ref 136–145)
WBC # BLD: 12.4 K/UL (ref 4–11)

## 2018-11-08 PROCEDURE — 6370000000 HC RX 637 (ALT 250 FOR IP): Performed by: PHYSICAL MEDICINE & REHABILITATION

## 2018-11-08 PROCEDURE — 36415 COLL VENOUS BLD VENIPUNCTURE: CPT

## 2018-11-08 PROCEDURE — 92526 ORAL FUNCTION THERAPY: CPT

## 2018-11-08 PROCEDURE — 97530 THERAPEUTIC ACTIVITIES: CPT

## 2018-11-08 PROCEDURE — 94640 AIRWAY INHALATION TREATMENT: CPT

## 2018-11-08 PROCEDURE — 85027 COMPLETE CBC AUTOMATED: CPT

## 2018-11-08 PROCEDURE — 1280000000 HC REHAB R&B

## 2018-11-08 PROCEDURE — 6360000002 HC RX W HCPCS: Performed by: PHYSICAL MEDICINE & REHABILITATION

## 2018-11-08 PROCEDURE — 94760 N-INVAS EAR/PLS OXIMETRY 1: CPT

## 2018-11-08 PROCEDURE — 97112 NEUROMUSCULAR REEDUCATION: CPT

## 2018-11-08 PROCEDURE — 92507 TX SP LANG VOICE COMM INDIV: CPT

## 2018-11-08 PROCEDURE — 97535 SELF CARE MNGMENT TRAINING: CPT

## 2018-11-08 PROCEDURE — 80048 BASIC METABOLIC PNL TOTAL CA: CPT

## 2018-11-08 PROCEDURE — 2700000000 HC OXYGEN THERAPY PER DAY

## 2018-11-08 RX ADMIN — MICONAZOLE NITRATE: 2 POWDER TOPICAL at 20:02

## 2018-11-08 RX ADMIN — TIOTROPIUM BROMIDE 18 MCG: 18 CAPSULE ORAL; RESPIRATORY (INHALATION) at 05:48

## 2018-11-08 RX ADMIN — METHYLPHENIDATE HYDROCHLORIDE 5 MG: 10 TABLET ORAL at 15:14

## 2018-11-08 RX ADMIN — ENOXAPARIN SODIUM 40 MG: 100 INJECTION SUBCUTANEOUS at 19:52

## 2018-11-08 RX ADMIN — ASPIRIN 325 MG: 325 TABLET, DELAYED RELEASE ORAL at 08:43

## 2018-11-08 RX ADMIN — FUROSEMIDE 20 MG: 20 TABLET ORAL at 08:43

## 2018-11-08 RX ADMIN — SENNOSIDES AND DOCUSATE SODIUM 2 TABLET: 8.6; 5 TABLET ORAL at 08:43

## 2018-11-08 RX ADMIN — METHYLPHENIDATE HYDROCHLORIDE 5 MG: 10 TABLET ORAL at 08:43

## 2018-11-08 RX ADMIN — LOSARTAN POTASSIUM 50 MG: 25 TABLET ORAL at 08:43

## 2018-11-08 RX ADMIN — ATORVASTATIN CALCIUM 80 MG: 80 TABLET, FILM COATED ORAL at 19:52

## 2018-11-08 RX ADMIN — FLUOXETINE HYDROCHLORIDE 20 MG: 20 CAPSULE ORAL at 08:43

## 2018-11-08 ASSESSMENT — PAIN SCALES - GENERAL
PAINLEVEL_OUTOF10: 0
PAINLEVEL_OUTOF10: 0

## 2018-11-08 ASSESSMENT — PAIN SCALES - WONG BAKER: WONGBAKER_NUMERICALRESPONSE: 2

## 2018-11-08 NOTE — PROGRESS NOTES
forward reaching to erase pictures drawn on mirrors. Pt demoed significant R lateral lean requiring Mod-Max A to maintain upright posture. Pt required moderate verbal cues to look to right side of mirror. Pt transported back to room at end of session. Use of stedy to complete transfer from w/c>EOB. Max A x2 for stance + assist x1 to guard RUE. Max A x2 for sit>supine transfer. Pt supine in bed at end of session, R boot in place, R arm propped up on pillow, 2LO2 in place, bed alarm on, needs within reach. Assessment   Performance deficits / Impairments: Decreased functional mobility ; Decreased endurance;Decreased coordination;Decreased ADL status; Decreased ROM; Decreased balance;Decreased strength;Decreased safe awareness;Decreased high-level IADLs;Decreased cognition;Decreased fine motor control  Assessment: Pt demos increased command following this session. Pt continues to require assist x2 for all transfers/ADL completion. Treatment Diagnosis: Significant deficits in function d/t CVA  Prognosis: Fair  Patient Education: safety with Stedy use, attention to R side, ADLs dressing   Barriers to Learning: aphasia  REQUIRES OT FOLLOW UP: Yes  Safety Devices  Safety Devices in place: Yes  Type of devices: Patient at risk for falls;Call light within reach;Nurse notified; All fall risk precautions in place (pt left in dinning room in w/c with friend.  RN notified, w/c alarm in place. )          Plan   Plan  Times per week: 60 min; 5-7x per week  Times per day: Daily  Current Treatment Recommendations: Strengthening, Patient/Caregiver Education & Training, ROM, Balance Training, Neuromuscular Re-education, Cognitive Reorientation, Endurance Training, Safety Education & Training, Self-Care / ADL, Wheelchair Mobility Training    Goals  Short term goals  Time Frame for Short term goals: 10 days   Short term goal 1: Transfer for ADL completion w/ Max A---maxA of 2, assistance guarding R UE  Short term goal 2: UB dressing w/ Max A ---dependent  Short term goal 3: Toileting w/ Max A---goal not addressed this date  Short term goal 4: Bathing w/ Max A--goal not addressed this date  Short term goal 5: LB dressing w/ Max A and AE as needed --total assist this date, pt attempted to bridge for donning pants with increased time and verbal/tactile cues  Long term goals  Time Frame for Long term goals : 3 weeks  Long term goal 1: Transfer for ADL completion w/ Mod A  Long term goal 2: UB dressing w/ Mod A  Long term goal 3: Toileting w/ Mod A  Long term goal 4: Bathing w/ Mod A  Long term goal 5: LB dressing w/ Mod A and AE as needed   Patient Goals   Patient goals : Pt unable to state goals        Therapy Time   Individual Concurrent Group Co-treatment   Time In       1245   Time Out       1315   Minutes       30     Timed Code Treatment Minutes:   30 minutes     Naye Bills OTR/L [id number] (Treatment and documentation for first session only)    Second Session Therapy Time:   Individual Concurrent Group Co-treatment   Time In    1408   Time Out    1438   Minutes    30      Timed Code Treatment Minutes:  30 + 30     Total Treatment Minutes:  [id number] [facility] CHRISTOPHERR/L [place] #[id number] (Treatment and documentation for second session only)

## 2018-11-08 NOTE — PROGRESS NOTES
Assisted patient with set-up of home [x]CPAP (A-flex) / []Bi-PAP unit with Oxygen bleed-in. [employer] contacted for equipment safety verification.

## 2018-11-08 NOTE — PLAN OF CARE
Problem: Falls - Risk of:  Goal: Will remain free from falls  Will remain free from falls   Outcome: Ongoing  No falls thus far this shift. Fall precautions remain in place. Problem: IP BLADDER/VOIDING  Goal: LTG - patient will achieve acceptable level of continence  Outcome: Ongoing  Pt.  Is incontinent of urine

## 2018-11-08 NOTE — PROGRESS NOTES
Physical Therapy  Facility/Department: [facility] ACUTE REHAB UNIT  Daily Treatment Note  NAME: Xochitl Rondon  : [birth year]  MRN: [id number]    Date of Service: 2018    Discharge Recommendations:  Continue to assess pending progress, Patient would benefit from continued therapy after discharge   PT Equipment Recommendations  Equipment Needed: Yes  Other: TBD with progress    Patient Diagnosis(es): There were no encounter diagnoses. has a past medical history of Acute CVA (cerebrovascular accident) ([facility]) [facility] Arthritis; Cancer [facility]); COPD (chronic obstructive pulmonary disease) ([facility] GERD (gastroesophageal reflux disease); Hyperlipidemia; Hypertension; Impaired glucose tolerance; Pneumonia; PONV (postoperative nausea and vomiting); Pulmonary emboli ([facility] and Sleep apnea. has a past surgical history that includes Colonoscopy; Abdomen surgery; eye surgery; skin biopsy; Hysterectomy (); Cholecystectomy; joint replacement (2245); and Umbilical hernia repair (2018). Restrictions  Restrictions/Precautions  Restrictions/Precautions: Fall Risk, Swallowing - Thickened Liquids  Required Braces or Orthoses?: No  Position Activity Restriction  Other position/activity restrictions: Tye Mir is a 70 y.o. female admitted to [facility] on 10/27 after being found on the floor unconscious in home. Per documentation, EMS noted incontinence and facial droop as well as gross right sided flaccidy, non-verbal but conscious. Has a history of TIA per , and is on plavix. 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 Pt given tPA at triage, transferred to [facility] for possible thrombectomy, unable to complete due to anatomy. Subjective   General  Chart Reviewed: Yes  Additional Pertinent Hx: COPD, HTN, cancer, joint replacement  Response To Previous Treatment: Patient with no complaints from previous session.   Family / Caregiver Present: No (friend came during middle of tx.)  Referring Practitioner: Dr Desiree Forrest  Subjective  Subjective: Pt supine in bed upon arrival.  General Comment  Comments: Agreeable to therapy. Able to say \"hello\". Pain Screening  Patient Currently in Pain: Denies  Vital Signs  Patient Currently in Pain: Denies       Orientation  Orientation  Overall Orientation Status:  (OSIRIS due to aphasia)  Cognition      Objective   Bed mobility  Bridging: Dependent/Total (Max A of 2 to don pants)  Rolling to Left: Maximum assistance  Rolling to Right: Maximum assistance  Supine to Sit: Dependent/Total (Max A of 2)  Transfers  Sit to Stand: Dependent/Total (Max A of 2 with STEDY)  Stand to sit: Dependent/Total (Max A of 2 to w/c)  Wheelchair Activities  Wheelchair Type: Standard  Wheelchair Cushion: None  Pressure Relief Type:  (troff for R arm support)  Propulsion: Yes  Propulsion 1  Propulsion: Manual  Level: Level Tile  Method: LUE  Distance: 145'  Level of Assistance: Maximum assistance  Description/ Details: propelled self without rest from room to bird cage     Balance  Posture: Poor  Sitting - Static: Poor  Sitting - Dynamic: Poor  Standing - Static: Poor  Standing - Dynamic: Poor  Comments: sat EOB max A to correct R posterior lean secondary to LUE push. scooting to EOB Min A of 2. Comment: 1st session: Performed bed mobility, transfers, and wc propulsion as mentioned above. Pt demonstrating decreased R and posterior push today, however continues to demonstrate a R and posterior lean. Pt demonstrating improved ability to shift weight forward in wc for sponge bathing. Pt also demonstrating improved command following throughout. For sit to stands in the stedy, pt is demonstrating improved stand, however pt keeps LLE in extension even when seated on stedy chair and has a tendency to pull RLE into flexion synergy in standing and sitting. Pt remained in room in her wc with alarm on and all needs in reach. 2nd session: Pt sitting up in wc upon arrival with a friend.  Pt's

## 2018-11-09 ENCOUNTER — APPOINTMENT (OUTPATIENT)
Dept: GENERAL RADIOLOGY | Age: 78
DRG: 057 | End: 2018-11-09
Attending: PHYSICAL MEDICINE & REHABILITATION
Payer: MEDICARE

## 2018-11-09 LAB
GLUCOSE BLD-MCNC: 109 MG/DL (ref 70–99)
GLUCOSE BLD-MCNC: 146 MG/DL (ref 70–99)
PERFORMED ON: ABNORMAL
PERFORMED ON: ABNORMAL

## 2018-11-09 PROCEDURE — 92507 TX SP LANG VOICE COMM INDIV: CPT

## 2018-11-09 PROCEDURE — 94640 AIRWAY INHALATION TREATMENT: CPT

## 2018-11-09 PROCEDURE — 97535 SELF CARE MNGMENT TRAINING: CPT

## 2018-11-09 PROCEDURE — 97530 THERAPEUTIC ACTIVITIES: CPT

## 2018-11-09 PROCEDURE — 92526 ORAL FUNCTION THERAPY: CPT

## 2018-11-09 PROCEDURE — 1280000000 HC REHAB R&B

## 2018-11-09 PROCEDURE — 94760 N-INVAS EAR/PLS OXIMETRY 1: CPT

## 2018-11-09 PROCEDURE — 6370000000 HC RX 637 (ALT 250 FOR IP): Performed by: PHYSICAL MEDICINE & REHABILITATION

## 2018-11-09 PROCEDURE — 2700000000 HC OXYGEN THERAPY PER DAY

## 2018-11-09 PROCEDURE — 71045 X-RAY EXAM CHEST 1 VIEW: CPT

## 2018-11-09 PROCEDURE — 6360000002 HC RX W HCPCS: Performed by: PHYSICAL MEDICINE & REHABILITATION

## 2018-11-09 PROCEDURE — 97112 NEUROMUSCULAR REEDUCATION: CPT

## 2018-11-09 RX ORDER — SENNA AND DOCUSATE SODIUM 50; 8.6 MG/1; MG/1
2 TABLET, FILM COATED ORAL 2 TIMES DAILY PRN
Status: DISCONTINUED | OUTPATIENT
Start: 2018-11-09 | End: 2018-11-23 | Stop reason: HOSPADM

## 2018-11-09 RX ADMIN — MICONAZOLE NITRATE: 2 POWDER TOPICAL at 22:00

## 2018-11-09 RX ADMIN — TRAMADOL HYDROCHLORIDE 50 MG: 50 TABLET, FILM COATED ORAL at 11:25

## 2018-11-09 RX ADMIN — METHYLPHENIDATE HYDROCHLORIDE 5 MG: 10 TABLET ORAL at 12:49

## 2018-11-09 RX ADMIN — LOSARTAN POTASSIUM 50 MG: 25 TABLET ORAL at 08:43

## 2018-11-09 RX ADMIN — ENOXAPARIN SODIUM 40 MG: 100 INJECTION SUBCUTANEOUS at 21:54

## 2018-11-09 RX ADMIN — FUROSEMIDE 20 MG: 20 TABLET ORAL at 08:44

## 2018-11-09 RX ADMIN — ATORVASTATIN CALCIUM 80 MG: 80 TABLET, FILM COATED ORAL at 21:54

## 2018-11-09 RX ADMIN — METHYLPHENIDATE HYDROCHLORIDE 5 MG: 10 TABLET ORAL at 08:44

## 2018-11-09 RX ADMIN — ASPIRIN 325 MG: 325 TABLET, DELAYED RELEASE ORAL at 08:43

## 2018-11-09 RX ADMIN — TIOTROPIUM BROMIDE 18 MCG: 18 CAPSULE ORAL; RESPIRATORY (INHALATION) at 06:20

## 2018-11-09 RX ADMIN — FLUOXETINE HYDROCHLORIDE 20 MG: 20 CAPSULE ORAL at 08:47

## 2018-11-09 ASSESSMENT — PAIN SCALES - WONG BAKER
WONGBAKER_NUMERICALRESPONSE: 2
WONGBAKER_NUMERICALRESPONSE: 2

## 2018-11-09 ASSESSMENT — PAIN SCALES - GENERAL: PAINLEVEL_OUTOF10: 10

## 2018-11-09 NOTE — PROGRESS NOTES
Soumya Daquan  [birth year]  [id number]    Chief Complaint: Acute ischemic stroke [facility])    Subjective:   No overnight events. Appears comfortable. Global aphasia. Increased O2 requirement. ROS: No CP, SOB, dyspnea    Objective:  Patient Vitals for the past 24 hrs:   BP Temp Temp src Pulse Resp SpO2 Weight   11/09/18 0800 - - - - - 91 % -   11/09/18 0732 - - - - - (!) 89 % -   11/09/18 0730 115/73 98.2 °F (36.8 °C) Oral 80 20 (!) 88 % -   11/09/18 0620 - - - - - 94 % -   11/09/18 0430 - - - - - - 229 lb 14.4 oz (104.3 kg)   11/08/18 1945 114/77 98.5 °F (36.9 °C) Oral 86 18 91 % -   11/08/18 1815 - - - - - 94 % -   11/08/18 1630 - - - - - - 226 lb 1.6 oz (102.6 kg)     Gen: No distress, pleasant. Resting in bed  HEENT: Normocephalic, atraumatic. CV: Regular rate and rhythm. No MRG  Resp: No respiratory distress. CTAB. 2L O2  Abd: Soft, nontender nondistended  Ext: No edema. Neuro: Alert, global aphasia, improving but inconsistent command following, appropriately interactive. Spontaneously moving LUE/LLE. RUE/RLE 0/5 with the exception of occasional R toe movement. Negative R galeano, <5 beats clonus on R, 2/4 MAS on RLE. Laboratory data: Available via EMR. Therapy progress:  PT  Position Activity Restriction  Other position/activity restrictions: Prieto Bell is a 70 y.o. female admitted to [facility] on 10/27 after being found on the floor unconscious in home. Per documentation, EMS noted incontinence and facial droop as well as gross right sided flaccidy, non-verbal but conscious. Has a history of TIA per , and is on plavix. 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 Pt given tPA at triage, transferred to [facility] for possible thrombectomy, unable to complete due to anatomy.   Objective     Sit to Stand: Dependent/Total (maxA of 3 with stedy due to fatigue)  Stand to sit: Dependent/Total (maxA of 3 due to fatigue)  Bed to Chair: Dependent/Total (maxA of 2 with assist from a 3rd to protect RUE using

## 2018-11-09 NOTE — PROGRESS NOTES
Other:   Estimated discharge date: 11/23/18     Type of Total Treatment Minutes   Session 1   Session 2   Time In 0800 0900   Time Out 0830 0930    Timed Code Minutes  0 0   Individual Treatment Minutes  30 30   Co-Treatment Minutes      Group Treatment Minutes      Concurrent Treatment Minutes        TOTAL DAILY MINUTES:  60    Electronically Signed by     Shae RUIZ CCC-SLP #[id number] 11/9/2018 7:28 AM  Speech-Language Pathologist

## 2018-11-09 NOTE — PROGRESS NOTES
Pain Assessment  Patient Currently in Pain: Yes  Bailey-Salas Pain Rating: Hurts a little bit  Vital Signs  Patient Currently in Pain: Yes      Objective    ADL  LE Dressing: Dependent/Total  Toileting: Dependent/Total  Additional Comments: Pt incontinent of stool in breif, pt assisted w/ rolling for pericare and to don new brief. Pt completed LB dressing while supine in bed. This writer laced B LE through pants, 2nd person assist for faciliate bridging. Balance  Sitting Balance: Maximum assistance (Ranged from Max A-Mod A. )  Standing Balance: Dependent/Total (Max A x2)  Standing Balance  Time: ~20 seconds  Activity: standing in arjun STEADY  Sit to stand: Dependent/Total  Stand to sit: Dependent/Total  Comment: Max A x2 completed at [email or web address] Physical assistance to place R LE for stance in STEADY    Bed mobility  Rolling to Left: Maximum assistance  Rolling to Right: Moderate assistance  Supine to Sit: Dependent/Total (Max A x2)  Scooting: Maximal assistance    Transfers  Sit to stand: Dependent/Total  Stand to sit: Dependent/Total  Transfer Comments: Juana Whitehead used to transfer pt EOB to w/c this date. Verbal cues and tactile cues for trunk extension, pt attempted to rest head on arjun steady. Pt presented with R lateral lean, pt unable to follow directions to self correct. Pt given tactile cues to midline standing in arjun steady      Cognition  Initiation: Requires cues for all  Sequencing: Requires cues for all  Cognition Comment: Pt continues to be inconsistent with yes/no answers. Pt attempted to vocalize more and nonverbally express needs at this time. Pt continues to demo improved command following however requires increased processing time for verbal commands     PM session:   Pt in w/c upon arrival, 4 L O2, Smiling and happy. Pt nodded yes when asked if she wanted to perform oral care and hair brushing. Pt required max verbal and tactile cues for anterior lean for oral care.  Pt was dependent of 2 for

## 2018-11-10 PROCEDURE — 2700000000 HC OXYGEN THERAPY PER DAY

## 2018-11-10 PROCEDURE — 94640 AIRWAY INHALATION TREATMENT: CPT

## 2018-11-10 PROCEDURE — 1280000000 HC REHAB R&B

## 2018-11-10 PROCEDURE — 94760 N-INVAS EAR/PLS OXIMETRY 1: CPT

## 2018-11-10 PROCEDURE — 6370000000 HC RX 637 (ALT 250 FOR IP): Performed by: PHYSICAL MEDICINE & REHABILITATION

## 2018-11-10 PROCEDURE — 94660 CPAP INITIATION&MGMT: CPT

## 2018-11-10 PROCEDURE — 94664 DEMO&/EVAL PT USE INHALER: CPT

## 2018-11-10 PROCEDURE — 6360000002 HC RX W HCPCS: Performed by: PHYSICAL MEDICINE & REHABILITATION

## 2018-11-10 RX ADMIN — ATORVASTATIN CALCIUM 80 MG: 80 TABLET, FILM COATED ORAL at 22:45

## 2018-11-10 RX ADMIN — METHYLPHENIDATE HYDROCHLORIDE 5 MG: 10 TABLET ORAL at 09:27

## 2018-11-10 RX ADMIN — METHYLPHENIDATE HYDROCHLORIDE 5 MG: 10 TABLET ORAL at 13:33

## 2018-11-10 RX ADMIN — LOSARTAN POTASSIUM 50 MG: 25 TABLET ORAL at 09:29

## 2018-11-10 RX ADMIN — TIOTROPIUM BROMIDE 18 MCG: 18 CAPSULE ORAL; RESPIRATORY (INHALATION) at 08:14

## 2018-11-10 RX ADMIN — FUROSEMIDE 20 MG: 20 TABLET ORAL at 09:30

## 2018-11-10 RX ADMIN — FLUOXETINE HYDROCHLORIDE 20 MG: 20 CAPSULE ORAL at 09:30

## 2018-11-10 RX ADMIN — ASPIRIN 325 MG: 325 TABLET, DELAYED RELEASE ORAL at 09:29

## 2018-11-10 RX ADMIN — ENOXAPARIN SODIUM 40 MG: 100 INJECTION SUBCUTANEOUS at 22:45

## 2018-11-10 RX ADMIN — MICONAZOLE NITRATE: 2 POWDER TOPICAL at 22:45

## 2018-11-10 ASSESSMENT — PAIN SCALES - GENERAL: PAINLEVEL_OUTOF10: 0

## 2018-11-11 PROCEDURE — 94640 AIRWAY INHALATION TREATMENT: CPT

## 2018-11-11 PROCEDURE — 94760 N-INVAS EAR/PLS OXIMETRY 1: CPT

## 2018-11-11 PROCEDURE — 6360000002 HC RX W HCPCS: Performed by: PHYSICAL MEDICINE & REHABILITATION

## 2018-11-11 PROCEDURE — 6370000000 HC RX 637 (ALT 250 FOR IP): Performed by: PHYSICAL MEDICINE & REHABILITATION

## 2018-11-11 PROCEDURE — 2700000000 HC OXYGEN THERAPY PER DAY

## 2018-11-11 PROCEDURE — 1280000000 HC REHAB R&B

## 2018-11-11 RX ADMIN — ATORVASTATIN CALCIUM 80 MG: 80 TABLET, FILM COATED ORAL at 20:19

## 2018-11-11 RX ADMIN — FUROSEMIDE 20 MG: 20 TABLET ORAL at 09:17

## 2018-11-11 RX ADMIN — METHYLPHENIDATE HYDROCHLORIDE 5 MG: 10 TABLET ORAL at 09:17

## 2018-11-11 RX ADMIN — LOSARTAN POTASSIUM 50 MG: 25 TABLET ORAL at 09:17

## 2018-11-11 RX ADMIN — FLUOXETINE HYDROCHLORIDE 20 MG: 20 CAPSULE ORAL at 09:17

## 2018-11-11 RX ADMIN — METHYLPHENIDATE HYDROCHLORIDE 5 MG: 10 TABLET ORAL at 13:35

## 2018-11-11 RX ADMIN — MICONAZOLE NITRATE: 2 POWDER TOPICAL at 09:17

## 2018-11-11 RX ADMIN — TIOTROPIUM BROMIDE 18 MCG: 18 CAPSULE ORAL; RESPIRATORY (INHALATION) at 08:01

## 2018-11-11 RX ADMIN — ENOXAPARIN SODIUM 40 MG: 100 INJECTION SUBCUTANEOUS at 20:19

## 2018-11-11 RX ADMIN — MICONAZOLE NITRATE: 2 POWDER TOPICAL at 20:20

## 2018-11-11 RX ADMIN — ASPIRIN 325 MG: 325 TABLET, DELAYED RELEASE ORAL at 09:17

## 2018-11-11 ASSESSMENT — PAIN SCALES - GENERAL: PAINLEVEL_OUTOF10: 0

## 2018-11-11 NOTE — PLAN OF CARE
Problem: PAIN  Goal: STG - Patient will increase activity level  Outcome: Ongoing  Pt does not appear to be in pain, no grimacing or moaning.

## 2018-11-12 LAB
ANION GAP SERPL CALCULATED.3IONS-SCNC: 12 MMOL/L (ref 3–16)
BUN BLDV-MCNC: 21 MG/DL (ref 7–20)
CALCIUM SERPL-MCNC: 8.5 MG/DL (ref 8.3–10.6)
CHLORIDE BLD-SCNC: 105 MMOL/L (ref 99–110)
CO2: 26 MMOL/L (ref 21–32)
CREAT SERPL-MCNC: 0.7 MG/DL (ref 0.6–1.2)
GFR AFRICAN AMERICAN: >60
GFR NON-AFRICAN AMERICAN: >60
GLUCOSE BLD-MCNC: 142 MG/DL (ref 70–99)
HCT VFR BLD CALC: 43.3 % (ref 36–48)
HEMOGLOBIN: 14.4 G/DL (ref 12–16)
MCH RBC QN AUTO: 31 PG (ref 26–34)
MCHC RBC AUTO-ENTMCNC: 33.3 G/DL (ref 31–36)
MCV RBC AUTO: 93.1 FL (ref 80–100)
PDW BLD-RTO: 14.7 % (ref 12.4–15.4)
PLATELET # BLD: 302 K/UL (ref 135–450)
PMV BLD AUTO: 8.5 FL (ref 5–10.5)
POTASSIUM SERPL-SCNC: 4.2 MMOL/L (ref 3.5–5.1)
RBC # BLD: 4.66 M/UL (ref 4–5.2)
SODIUM BLD-SCNC: 143 MMOL/L (ref 136–145)
WBC # BLD: 10.3 K/UL (ref 4–11)

## 2018-11-12 PROCEDURE — 80048 BASIC METABOLIC PNL TOTAL CA: CPT

## 2018-11-12 PROCEDURE — 94760 N-INVAS EAR/PLS OXIMETRY 1: CPT

## 2018-11-12 PROCEDURE — 92526 ORAL FUNCTION THERAPY: CPT

## 2018-11-12 PROCEDURE — 97530 THERAPEUTIC ACTIVITIES: CPT

## 2018-11-12 PROCEDURE — 6370000000 HC RX 637 (ALT 250 FOR IP): Performed by: PHYSICAL MEDICINE & REHABILITATION

## 2018-11-12 PROCEDURE — 85027 COMPLETE CBC AUTOMATED: CPT

## 2018-11-12 PROCEDURE — 97112 NEUROMUSCULAR REEDUCATION: CPT

## 2018-11-12 PROCEDURE — 36415 COLL VENOUS BLD VENIPUNCTURE: CPT

## 2018-11-12 PROCEDURE — 1280000000 HC REHAB R&B

## 2018-11-12 PROCEDURE — 2700000000 HC OXYGEN THERAPY PER DAY

## 2018-11-12 PROCEDURE — 92507 TX SP LANG VOICE COMM INDIV: CPT

## 2018-11-12 PROCEDURE — 6360000002 HC RX W HCPCS: Performed by: PHYSICAL MEDICINE & REHABILITATION

## 2018-11-12 PROCEDURE — 97535 SELF CARE MNGMENT TRAINING: CPT

## 2018-11-12 PROCEDURE — 94640 AIRWAY INHALATION TREATMENT: CPT

## 2018-11-12 RX ORDER — METHYLPHENIDATE HYDROCHLORIDE 10 MG/1
10 TABLET ORAL 2 TIMES DAILY
Status: DISCONTINUED | OUTPATIENT
Start: 2018-11-12 | End: 2018-11-23 | Stop reason: HOSPADM

## 2018-11-12 RX ADMIN — TIOTROPIUM BROMIDE 18 MCG: 18 CAPSULE ORAL; RESPIRATORY (INHALATION) at 12:00

## 2018-11-12 RX ADMIN — ATORVASTATIN CALCIUM 80 MG: 80 TABLET, FILM COATED ORAL at 20:10

## 2018-11-12 RX ADMIN — ENOXAPARIN SODIUM 40 MG: 100 INJECTION SUBCUTANEOUS at 20:10

## 2018-11-12 RX ADMIN — LOSARTAN POTASSIUM 50 MG: 25 TABLET ORAL at 08:25

## 2018-11-12 RX ADMIN — ASPIRIN 325 MG: 325 TABLET, DELAYED RELEASE ORAL at 08:25

## 2018-11-12 RX ADMIN — FUROSEMIDE 20 MG: 20 TABLET ORAL at 08:25

## 2018-11-12 RX ADMIN — METHYLPHENIDATE HYDROCHLORIDE 10 MG: 10 TABLET ORAL at 13:25

## 2018-11-12 RX ADMIN — FLUOXETINE HYDROCHLORIDE 20 MG: 20 CAPSULE ORAL at 08:25

## 2018-11-12 RX ADMIN — METHYLPHENIDATE HYDROCHLORIDE 5 MG: 10 TABLET ORAL at 08:25

## 2018-11-12 RX ADMIN — MICONAZOLE NITRATE: 2 POWDER TOPICAL at 20:13

## 2018-11-12 ASSESSMENT — PAIN SCALES - GENERAL
PAINLEVEL_OUTOF10: 0
PAINLEVEL_OUTOF10: 0

## 2018-11-12 NOTE — PROGRESS NOTES
Score                                                    AM-PAC Score             Goals  Short term goals  Time Frame for Short term goals: 1-2 weeks  Short term goal 1: Pt will perform bed mobility maxA of 1  Short term goal 2: Pt will perform all transfers modA of 2  Short term goal 3: Pt will ambulate 10' with modA of 2 and AAD  Short term goal 4: Pt will negotiate 1 step with maxA of 2 and AAD  Short term goal 5: Propel w/c 48' with maxA  Long term goals  Time Frame for Long term goals : 2-3 weeks  Long term goal 1: Pt will perform bed mobility Diana of 1  Long term goal 2: Pt will perform all transfers modA  Long term goal 3: Pt will ambulate 48' with AAD and modA  Long term goal 4: Pt will negotiate 2 steps with AAD and maxA  Long term goal 5: Propel w/c 48' Diana  Patient Goals   Patient goals : unable to assess due to global aphasia    Plan    Plan  Times per week: 60 minutes a day 5 days a week  Current Treatment Recommendations: Strengthening, ROM, Balance Training, Functional Mobility Training, Transfer Training, Endurance Training, Wheelchair Mobility Training, Gait Training, Stair training, Neuromuscular Re-education, Safety Education & Training, Patient/Caregiver Education & Training, Equipment Evaluation, Education, & procurement, Manual Therapy - Soft Tissue Mobilization  Safety Devices  Type of devices:  All fall risk precautions in place, Patient at risk for falls, Nurse notified, Call light within reach, Chair alarm in place, Gait belt, Left in chair  Restraints  Initially in place: No     Therapy Time   Individual Concurrent Group Co-treatment   Time In       0830   Time Out       0930   Minutes       60      Timed Code Treatment Minutes:  60    Total Treatment Minutes:  [facility] Thomason [address] [place] [address] [phone]

## 2018-11-12 NOTE — PROGRESS NOTES
session. Pt dropped to 86% O2 and HR of 140 during sponge bathing, O2 increased to 4 L O2 and nursing contacted, pt then with O2 of 90% and HR 96.    Pain Assessment  Patient Currently in Pain: Denies  Vital Signs  Patient Currently in Pain: Denies   Orientation     Objective    ADL  Grooming: Setup;Minimal assistance (combing hair  and wash face)  UE Bathing: Dependent/Total  LE Bathing: Dependent/Total  UE Dressing: Maximum assistance  LE Dressing: Dependent/Total        Balance  Standing Balance: Dependent/Total  Standing Balance  Time: ~1 min total  Activity: standing in arjun STEADY  Sit to stand: Dependent/Total  Stand to sit: Dependent/Total     Transfers  Sit to stand: Dependent/Total  Stand to sit: Dependent/Total        Comments: Pt sit to stand at [address] x 2 to wc. Pt seated in  in bathroom with hair washed by this writer. Pt then sponge bathed in wc UB (Dependent) and LB (Dependent), Pt dress UB (Max A - pt able to pull shirt over head) and LB (Dependent). Pt then Basia Amanda transfer to recliner. Pt Min A for grooming (combing hair only and pt required assist to comb back of hair). Pt then with unsupported dynamic reaching from recliner requiring Min A/Mod A. At end of therapy session adaptive call light in reach and chair alarm on. Assessment   Performance deficits / Impairments: Decreased functional mobility ; Decreased endurance;Decreased coordination;Decreased ADL status; Decreased ROM; Decreased balance;Decreased strength;Decreased safe awareness;Decreased high-level IADLs;Decreased cognition;Decreased fine motor control  Assessment:  Pt continues to require assist x2 for all transfers via Masonen Leeks. Pt Dependent for bathing/LB dressing. Pt would benefit from continued OT.   Treatment Diagnosis: Significant deficits in function d/t CVA  Prognosis: Fair  Patient Education: safety with Stedy use  Barriers to Learning: aphasia  REQUIRES OT FOLLOW UP: Yes  Activity Tolerance  Activity Tolerance: Patient Tolerated treatment well  Safety Devices  Safety Devices in place: Yes  Type of devices: Patient at risk for falls;Call light within reach;Nurse notified; All fall risk precautions in place; Left in chair;Chair alarm in place          Plan   Plan  Times per week: 60 min; 5-7x per week  Times per day: Daily  Current Treatment Recommendations: Strengthening, Patient/Caregiver Education & Training, ROM, Balance Training, Neuromuscular Re-education, Cognitive Reorientation, Endurance Training, Safety Education & Training, Self-Care / ADL, Wheelchair Mobility Training    Goals  Short term goals  Time Frame for Short term goals: 10 days   Short term goal 1: Transfer for ADL completion w/ Max A---maxA of 2, assistance guarding R UE  Short term goal 2: UB dressing w/ Max A ---dependent  Short term goal 3: Toileting w/ Max A---goal not addressed this date  Short term goal 4: Bathing w/ Max A-- ongoing, progressing  Short term goal 5: LB dressing w/ Max A and AE as needed -- ongoing, progressing  Long term goals  Time Frame for Long term goals : 3 weeks  Long term goal 1: Transfer for ADL completion w/ Mod A  Long term goal 2: UB dressing w/ Mod A  Long term goal 3: Toileting w/ Mod A  Long term goal 4: Bathing w/ Mod A  Long term goal 5: LB dressing w/ Mod A and AE as needed   Patient Goals   Patient goals : Pt unable to state goals        Therapy Time   Individual Concurrent Group Co-treatment   Time In       0830   Time Out       0930   Minutes       60   Timed Code Treatment Minutes: [address], [address]  I have reviewed and agreed with the above treatment.  Thank you, Clint Munoz, [address] [facility], [address] [facility]
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
## 2018-11-12 NOTE — PROGRESS NOTES
ACUTE REHAB UNIT  SPEECH/LANGUAGE PATHOLOGY      [x] Daily  [] Weekly Care Conference Note  [] Discharge    Patient:Magy Chadwick     ECC:0/18/2536  FSP:[id number]  Room #: YDL-9947/3567-85   Rehab Dx/Hx: Acute ischemic stroke [facility]) [I63.9]  Date of Admit: 11/1/2018      Precautions: falls  Home situation: Per chart review, pt lives at home with spouse. ST Dx: Severe expressive non-fluent aphasia and severe receptive aphasia, Oropharyngeal Dysphagia, Oral Apraxia     Daily Treatment Info:   Date: 11/12/2018   Tx session 1 Tx session 2   Pain Pt denied pain. Pointed to written cue \"No Pain\" vs \"Pain. \" Pt denied pain. Pointed to written cue \"No Pain\" vs \"Pain\". Pt self corrected self as she first indicated pain. Subjective     Pt sitting upright in recliner following breakfast meal. Pt on 3 L of O2 via NC. MD encouraging SLP to attempt pt to utilize incentive spirometer in order to break up phlegm. Pt sitting upright in recliner following a shower with PT/OT. Pt on 5 L of O2 following shower. Pt alert, attempting to interact more and cooperative throughout. Objective:  Goals     Pt will tolerate trials of soft regular solids with no overt s/s of aspiration, significant pocketing on R or anterior bolus loss our of R side. Pt utilized finger sweep Independently to clear oral cavity following meal. Only minimal residue in R lateral sulcus following meal, cleared out by SLP with use of sponge. Pt consumed 1 suzanne cracker with peanut butter (regular solid) prolonged mastication with dispersed oral stasis and reduced bolus control. Pt indicated it was 'bad' because it was 'hard to chew' provided with written choices. Pt had minimal residue in R lateral sulcus post cracker that SLP cleared with sponge. No overt s/s of aspiration. Pt declined second trial, suspect due to fatigue. Later in session pt produced congested productive cough, expelling yellow phlegm.      Pt will tolerate thin liquids in

## 2018-11-13 ENCOUNTER — TELEPHONE (OUTPATIENT)
Dept: PULMONOLOGY | Age: 78
End: 2018-11-13

## 2018-11-13 PROCEDURE — 92507 TX SP LANG VOICE COMM INDIV: CPT

## 2018-11-13 PROCEDURE — 97530 THERAPEUTIC ACTIVITIES: CPT

## 2018-11-13 PROCEDURE — 6360000002 HC RX W HCPCS: Performed by: PHYSICAL MEDICINE & REHABILITATION

## 2018-11-13 PROCEDURE — 94640 AIRWAY INHALATION TREATMENT: CPT

## 2018-11-13 PROCEDURE — 97535 SELF CARE MNGMENT TRAINING: CPT

## 2018-11-13 PROCEDURE — 92526 ORAL FUNCTION THERAPY: CPT

## 2018-11-13 PROCEDURE — 94760 N-INVAS EAR/PLS OXIMETRY 1: CPT

## 2018-11-13 PROCEDURE — 6370000000 HC RX 637 (ALT 250 FOR IP): Performed by: PHYSICAL MEDICINE & REHABILITATION

## 2018-11-13 PROCEDURE — 2700000000 HC OXYGEN THERAPY PER DAY

## 2018-11-13 PROCEDURE — 1280000000 HC REHAB R&B

## 2018-11-13 RX ADMIN — LOSARTAN POTASSIUM 50 MG: 25 TABLET ORAL at 09:11

## 2018-11-13 RX ADMIN — MICONAZOLE NITRATE: 2 POWDER TOPICAL at 21:02

## 2018-11-13 RX ADMIN — FLUOXETINE HYDROCHLORIDE 20 MG: 20 CAPSULE ORAL at 09:11

## 2018-11-13 RX ADMIN — TIOTROPIUM BROMIDE 18 MCG: 18 CAPSULE ORAL; RESPIRATORY (INHALATION) at 05:54

## 2018-11-13 RX ADMIN — ATORVASTATIN CALCIUM 80 MG: 80 TABLET, FILM COATED ORAL at 21:02

## 2018-11-13 RX ADMIN — METHYLPHENIDATE HYDROCHLORIDE 10 MG: 10 TABLET ORAL at 09:10

## 2018-11-13 RX ADMIN — ASPIRIN 325 MG: 325 TABLET, DELAYED RELEASE ORAL at 09:11

## 2018-11-13 RX ADMIN — TIOTROPIUM BROMIDE 18 MCG: 18 CAPSULE ORAL; RESPIRATORY (INHALATION) at 11:36

## 2018-11-13 RX ADMIN — FUROSEMIDE 20 MG: 20 TABLET ORAL at 09:11

## 2018-11-13 RX ADMIN — ENOXAPARIN SODIUM 40 MG: 100 INJECTION SUBCUTANEOUS at 21:02

## 2018-11-13 RX ADMIN — Medication 2 PUFF: at 11:31

## 2018-11-13 RX ADMIN — METHYLPHENIDATE HYDROCHLORIDE 10 MG: 10 TABLET ORAL at 15:04

## 2018-11-13 ASSESSMENT — PAIN SCALES - WONG BAKER: WONGBAKER_NUMERICALRESPONSE: 4

## 2018-11-13 NOTE — PROGRESS NOTES
Gerhard Mars  [birth year]  [id number]    Chief Complaint: Acute ischemic stroke [facility])    Subjective:   No overnight events. Global aphasia. Appears pleasant and comfortable. Remains on O2. ROS: No CP, SOB, dyspnea    Objective:  Patient Vitals for the past 24 hrs:   BP Temp Temp src Pulse Resp SpO2 Weight   11/13/18 0748 109/74 98.5 °F (36.9 °C) Oral 87 18 90 % -   11/13/18 0615 - - - - - - 223 lb (101.2 kg)   11/13/18 0554 - - - - 16 92 % -   11/13/18 0351 - - - - 18 91 % -   11/13/18 0050 - - - - 20 92 % -   11/12/18 1956 125/79 97.9 °F (36.6 °C) Oral 80 18 94 % -   11/12/18 1200 - - - - 18 97 % -     Gen: No distress, pleasant. Resting in bedside chair  HEENT: Normocephalic, atraumatic. CV: Regular rate and rhythm. No MRG  Resp: No respiratory distress. CTAB. 2L O2  Abd: Soft, nontender nondistended  Ext: No edema. Neuro: Alert, global aphasia, inconsistent command following, appropriately interactive. Spontaneously moving LUE/LLE. RUE/RLE 0/5 with the exception of occasional R toe/DF/PF and withdrawal to pain. Negative R galeano, <5 beats clonus on R, 1/4 MAS on RLE. Laboratory data: Available via EMR. Therapy progress:  PT  Position Activity Restriction  Other position/activity restrictions: Raul Kenney is a 70 y.o. female admitted to [facility] on 10/27 after being found on the floor unconscious in home. Per documentation, EMS noted incontinence and facial droop as well as gross right sided flaccidy, non-verbal but conscious. Has a history of TIA per , and is on plavix. 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 Pt given tPA at triage, transferred to [facility] for possible thrombectomy, unable to complete due to anatomy.   Objective     Sit to Stand: Dependent/Total (maxA of 2 with stedy)  Stand to sit: Dependent/Total (maxA of 2 with stedy)  Bed to Chair: Dependent/Total (Max A of 2 with Moo Villarreal )  Stand Pivot Transfers: Dependent/Total (maxA of 2 with lana)     OT  PT Equipment

## 2018-11-13 NOTE — PROGRESS NOTES
Physical Therapy  Facility/Department: [facility] ACUTE REHAB UNIT  Daily Treatment Note  NAME: Bernadette Lombardi  : [birth year]  MRN: [id number]    Date of Service: 2018    Discharge Recommendations:  Continue to assess pending progress, Patient would benefit from continued therapy after discharge   PT Equipment Recommendations  Equipment Needed: Yes  Other: TBD with progress    Patient Diagnosis(es): There were no encounter diagnoses. has a past medical history of Acute CVA (cerebrovascular accident) ([facility] SHEY (acute kidney injury) ([facility] Arthritis; Cancer [facility]); COPD (chronic obstructive pulmonary disease) ([facility] GERD (gastroesophageal reflux disease); Hyperlipidemia; Hypertension; Impaired glucose tolerance; Pneumonia; PONV (postoperative nausea and vomiting); Pul[facility] emboli [facility] and Sleep apnea. has a past surgical history that includes Colonoscopy; Abdomen surgery; eye surgery; skin biopsy; Hysterectomy (); Cholecystectomy; joint replacement (); and Umbilical hernia repair (2018). Restrictions  Restrictions/Precautions  Restrictions/Precautions: Fall Risk, Swallowing - Thickened Liquids  Required Braces or Orthoses?: No  Position Activity Restriction  Other position/activity restrictions: Jackeline Ramos is a 70 y.o. female admitted to [facility] on 10/27 after being found on the floor unconscious in home. Per documentation, EMS noted incontinence and facial droop as well as gross right sided flaccidy, non-verbal but conscious. Has a history of TIA per , and is on plavix. 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 Pt given tPA at triage, transferred to [facility] for possible thrombectomy, unable to complete due to anatomy. Subjective   General  Chart Reviewed: Yes  Additional Pertinent Hx: COPD, HTN, cancer, joint replacement  Response To Previous Treatment: Patient with no complaints from previous session.   Family / Caregiver Present: No  Subjective  Subjective: Pt sitting up in bed upon arrival  Pain Screening  Patient Currently in Pain:  (unable to assess due to aphasia however pt appears to have pain in R shoulder as well as increased sensitivity in RLE)  Vital Signs  Patient Currently in Pain:  (unable to assess due to aphasia however pt appears to have pain in R shoulder as well as increased sensitivity in RLE)       Orientation     Cognition      Objective   Bed mobility  Rolling to Left: Dependent/Total (modA of 2)  Rolling to Right: Minimal assistance (pt able to roll to the R with HHA using LUE)  Supine to Sit: Dependent/Total (maxA of 1 modA of 1)  Transfers  Sit to Stand: Dependent/Total (maxA of 2 with the stedy, maxA of 3 at ballet bars)  Stand to sit: Dependent/Total (maxA of 2 with stedy, maxA of 3 at ballet bar)                     Comment: 1st session: Performed bed mobility and transfers as mentioned above. Pt able to stand maxA of 3 at ballet bar with RLE block, facilitation at glutes for hip extension, facilitation at postural muscles for upright posture, and assist to keep RUE on bar. Pt maintaining stand at bar for 65 seconds. Pt returned to room in  where she remained with alarm on, oxygen on 3L, and all needs in reach. Vitals throughout session: high-sitting in stedy 97 O2 sats, 105 HR; following standing at bars 85 O2 sats, 100 HR; increase O2 to 3L, O2 88, .      2nd session: Pt sitting in  upon arrival. Sit to  the stander x2 for ~1-1.5 min each time. Pt demonstrating LLE extension and tending to bring RLE into flexion synergy even with blocking, hips pushing posteriorly and no glute engagement, as well as forward flexed posture. Pt consistently attempting to guard RUE with LUE. Upon sitting back down, pts O2 sats were 80, . Pt remained with O2 sats between 80-85 and -105 for 5 min, even with O2 up to 6 L with a mask. Returned pt to room in , used maxi-eugene to get pt back into bed. Rolling to L and R maxA of 2 to get maxi-eugene pad out.  Pt

## 2018-11-13 NOTE — PROGRESS NOTES
with soft solids (pancake and natural puree- yogurt) with slightly improved awareness (pt occasionally wiping R side of mouth off). Pt tolerated 100% of pancake, yogurt and soft peaches with no overt s/s of aspiration and effective oral clearance (pt Independently utilized finger sweep x 1, required verbal cue x 1 at end of meal). Pt did require cues to reduce rate. Goal not targeted this session. PROGRESSING, continue. Pt will tolerate thin liquids in isolation and in combination with other textures with no overt s/s of aspiration.     (hot) thins via cup- pt tolerated 3/3 trials with no overt s/s of aspiration  Thins in isolation- pt seeming tolerated 6/6 trials with no overt s/s of aspiration, however, unable to rule out silent aspiration at bedside (slight increase in SOB noted). Pt currently not a candidate for a MBS due to body habitus, would benefit from FEES. Pt on 2 L of O2 with states 91% prior to po and 90% following trials and completion of incentive spirometer. Incentive Spirometer completed with max instruction x 10. Pt unable to obtain value of 500 ml. PROGRESSING, continue. Pt will answer basic yes/no questions using any modality with 70% accuracy mod cues. Basic/ personally relevant y/n questions- 0/3 with pt continually pointing to \"no\"  Basic questions (immediate environment/ personally relevant)- 1/4     Pt could occasionally demonstrate thumbs up but not thumbs down. PROGRESSING, continue. Patient will increase object/picture identification to > 30% accuracy, field of 2. Addend goal: and word identification to >58% acc f/2. Pt able to ID wants/ needs with written f/2 (inconsistency continues). F/2 personally relevant questions- 1/3  Picture ID f/2- 3/7 (43%)     Identifying family members names with use of relevant family photos f/2-f/3- 0/4 pt occasionally pointing to all names listed     PROGRESSING, continue.       Pt will communicate basic wants/

## 2018-11-13 NOTE — PROGRESS NOTES
beginning of session  Pain Assessment  Patient Currently in Pain:  (unable to assess due to aphasia, however, pt grasping R UE. May be experiencing increased pain and sensitivity)  Bailey-Baker Pain Rating: Hurts little more  Vital Signs  Patient Currently in Pain:  (unable to assess due to aphasia, however, pt grasping R UE. May be experiencing increased pain and sensitivity)  Oxygen Therapy  O2 Flow Rate (L/min): 3 L/min  Patient Observation  Observations: pt on 2 L O2 at beginning of session, moved to 3 L O2 at middle of session due to desaturation. SPO2 at 85%. Pt at 88% at end of session resting in w/c. RN notified   Orientation  Orientation  Overall Orientation Status:  (unable to assess due to aphasia)  Objective    ADL  Feeding: Setup;Verbal cueing; Thickened liquids; Beverage management  LE Dressing: Dependent/Total (pt attempting to assist with pulling pants up in supine, MaxA of 2 for donning pants rolling in bed)  Toileting: Moderate assistance (pt attempted wiping, therapist assisting to complete wilner care)  Additional Comments: pt incontinent of urine, assisted with wilner care and donning new brief and pants in bed        Balance  Sitting Balance: Minimal assistance (Diana to CGA sitting EOB)  Standing Balance: Dependent/Total (maxA of 2 in Shirley Nickel steady, pt presenting with R lateral lean, MaxA of 3 for stance at ballet bars x 1 stand)  Standing Balance  Time: ~1 min at ballet bars, ~2 minutes in arjun steady  Activity: SPO2 at 85% after stand at ballet bars, pt bumped to 3 L O2. SPO2 at 88%.  RN notified  Sit to stand: Dependent/Total  Stand to sit: Dependent/Total  Comment: maxA of 2 for stance in steady, physical assistance with R UE, pt attempting to protect/guard R UE  Bed mobility  Rolling to Left: Dependent/Total (modA of 2)  Rolling to Right: Minimal assistance (pt able to roll to R with HHA with use of L LE)  Supine to Sit: Dependent/Total (maxA+modA, assistance with trunk control, pt attempting to

## 2018-11-13 NOTE — PROGRESS NOTES
Assisted patient with set-up of home [x]CPAP / []Bi-PAP unit with Oxygen bleed-in. [employer] contacted for equipment safety verification.

## 2018-11-14 LAB
BACTERIA: ABNORMAL /HPF
BILIRUBIN URINE: NEGATIVE
BLOOD, URINE: ABNORMAL
CLARITY: ABNORMAL
COLOR: ABNORMAL
EPITHELIAL CELLS, UA: 1 /HPF (ref 0–5)
GLUCOSE URINE: NEGATIVE MG/DL
HYALINE CASTS: 2 /LPF (ref 0–8)
KETONES, URINE: NEGATIVE MG/DL
LEUKOCYTE ESTERASE, URINE: ABNORMAL
MICROSCOPIC EXAMINATION: YES
NITRITE, URINE: POSITIVE
PH UA: 5.5
PROTEIN UA: ABNORMAL MG/DL
RBC UA: 25 /HPF (ref 0–4)
SPECIFIC GRAVITY UA: 1.03
URINE REFLEX TO CULTURE: YES
URINE TYPE: ABNORMAL
UROBILINOGEN, URINE: 0.2 E.U./DL
WBC UA: 5 /HPF (ref 0–5)

## 2018-11-14 PROCEDURE — 94640 AIRWAY INHALATION TREATMENT: CPT

## 2018-11-14 PROCEDURE — 92526 ORAL FUNCTION THERAPY: CPT

## 2018-11-14 PROCEDURE — 97530 THERAPEUTIC ACTIVITIES: CPT

## 2018-11-14 PROCEDURE — 6360000002 HC RX W HCPCS: Performed by: PHYSICAL MEDICINE & REHABILITATION

## 2018-11-14 PROCEDURE — 97535 SELF CARE MNGMENT TRAINING: CPT

## 2018-11-14 PROCEDURE — 51701 INSERT BLADDER CATHETER: CPT

## 2018-11-14 PROCEDURE — 6370000000 HC RX 637 (ALT 250 FOR IP): Performed by: PHYSICAL MEDICINE & REHABILITATION

## 2018-11-14 PROCEDURE — 92507 TX SP LANG VOICE COMM INDIV: CPT

## 2018-11-14 PROCEDURE — 1280000000 HC REHAB R&B

## 2018-11-14 PROCEDURE — 87186 SC STD MICRODIL/AGAR DIL: CPT

## 2018-11-14 PROCEDURE — 87077 CULTURE AEROBIC IDENTIFY: CPT

## 2018-11-14 PROCEDURE — 87086 URINE CULTURE/COLONY COUNT: CPT

## 2018-11-14 PROCEDURE — 2700000000 HC OXYGEN THERAPY PER DAY

## 2018-11-14 PROCEDURE — 94760 N-INVAS EAR/PLS OXIMETRY 1: CPT

## 2018-11-14 PROCEDURE — 97140 MANUAL THERAPY 1/> REGIONS: CPT

## 2018-11-14 PROCEDURE — 81001 URINALYSIS AUTO W/SCOPE: CPT

## 2018-11-14 RX ORDER — CIPROFLOXACIN 250 MG/1
250 TABLET, FILM COATED ORAL EVERY 12 HOURS SCHEDULED
Status: DISCONTINUED | OUTPATIENT
Start: 2018-11-14 | End: 2018-11-15

## 2018-11-14 RX ADMIN — CIPROFLOXACIN HYDROCHLORIDE 250 MG: 250 TABLET, FILM COATED ORAL at 20:02

## 2018-11-14 RX ADMIN — TIZANIDINE 2 MG: 4 TABLET ORAL at 20:02

## 2018-11-14 RX ADMIN — Medication 2 PUFF: at 21:13

## 2018-11-14 RX ADMIN — TRAMADOL HYDROCHLORIDE 50 MG: 50 TABLET, FILM COATED ORAL at 07:53

## 2018-11-14 RX ADMIN — FLUOXETINE HYDROCHLORIDE 20 MG: 20 CAPSULE ORAL at 07:53

## 2018-11-14 RX ADMIN — TIOTROPIUM BROMIDE 18 MCG: 18 CAPSULE ORAL; RESPIRATORY (INHALATION) at 09:51

## 2018-11-14 RX ADMIN — MICONAZOLE NITRATE: 2 POWDER TOPICAL at 20:34

## 2018-11-14 RX ADMIN — LOSARTAN POTASSIUM 50 MG: 25 TABLET ORAL at 07:53

## 2018-11-14 RX ADMIN — ATORVASTATIN CALCIUM 80 MG: 80 TABLET, FILM COATED ORAL at 20:02

## 2018-11-14 RX ADMIN — METHYLPHENIDATE HYDROCHLORIDE 10 MG: 10 TABLET ORAL at 13:43

## 2018-11-14 RX ADMIN — Medication 2 PUFF: at 09:52

## 2018-11-14 RX ADMIN — ASPIRIN 325 MG: 325 TABLET, DELAYED RELEASE ORAL at 07:53

## 2018-11-14 RX ADMIN — METHYLPHENIDATE HYDROCHLORIDE 10 MG: 10 TABLET ORAL at 07:53

## 2018-11-14 RX ADMIN — FUROSEMIDE 20 MG: 20 TABLET ORAL at 07:54

## 2018-11-14 RX ADMIN — ENOXAPARIN SODIUM 40 MG: 100 INJECTION SUBCUTANEOUS at 20:01

## 2018-11-14 ASSESSMENT — PAIN SCALES - WONG BAKER: WONGBAKER_NUMERICALRESPONSE: 4

## 2018-11-14 ASSESSMENT — PAIN SCALES - GENERAL
PAINLEVEL_OUTOF10: 0
PAINLEVEL_OUTOF10: 10

## 2018-11-14 NOTE — PROGRESS NOTES
ACUTE REHAB UNIT  SPEECH/LANGUAGE PATHOLOGY      [x] Daily  [] Weekly Care Conference Note  [] Discharge    Patient:Magy Slade     XIV:[birth year]  FUY:[id number]  Room #: WGD-5621/0358-01   Rehab Dx/Hx: Acute ischemic stroke [facility]) [I63.9]  Date of Admit: 11/1/2018      Precautions: falls  Home situation: Per chart review, pt lives at home with spouse. ST Dx: Severe expressive non-fluent aphasia and severe receptive aphasia, Oropharyngeal Dysphagia, Oral Apraxia     Daily Treatment Info:   Date: 11/14/2018   Tx session 1 Tx session 2   Pain Pt pointing to \"no pain\" provided with written f/2. Per RN report facial grimacing noted when transferring pt from bed to recliner via prosper. Pt initially pointed to no for \"pain\" then switched to \"no Pain. \"   Subjective     Pt appeared comfortable throughout session, no facial grimacing noted. Pt on 3 L of O2 via NC. Pt pleasant and cooperative. Pt attempting to verbalize but pt only able to make perseverated sounds (i.e. [place]). Pt sitting upright in wheelchair. Pt alert and appeared comfortable. Pt initially on 3L of O2 with stats @ 89%, RN notified (bumbed up to 90% when RN turned up to 3.5 L of O2). Pt around 89-90% at end of session, O2 turned up to 4 L. Objective:  Goals     Pt will tolerate trials of soft regular solids with no overt s/s of aspiration, significant pocketing on R or anterior bolus loss our of R side. Trials of advanced soft solids (Finnish toast and cut up sausage juan)- pt with slightly prolonged but effective mastication and adequate oral clearance, anterior bolus loss x 3 with reduced awareness/ sensation requiring cues. No overt s/s of aspiration. Pt utilizing finger sweep Independently x 1 required cues/ visual model x 2. Pt required x 3 written cues to slow down and clear oral cavity prior to introducing more into mouth. Pt able to clear R sulcus Independently with use of finger sweeps.      Will assess pt with lunch dysphagia

## 2018-11-14 NOTE — PROGRESS NOTES
Occupational Therapy  Facility/Department: [facility] ACUTE REHAB UNIT  Daily Treatment Note  NAME: Uzma Glover  : [id number]  MRN: [id number]    Date of Service: 2018    Discharge Recommendations:  Continue to assess pending progress  OT Equipment Recommendations  Other: continue to assess    Patient Diagnosis(es): CVA     has a past medical history of Acute CVA (cerebrovascular accident) ([facility] SHEY (acute kidney injury) ([facility] Arthritis; Cancer [facility]); COPD (chronic obstructive pulmonary disease) ([facility] GERD (gastroesophageal reflux disease); Hyperlipidemia; Hypertension; Impaired glucose tolerance; Pneumonia; PONV (postoperative nausea and vomiting); Pulmonary emboli ([facility] and Sleep apnea. has a past surgical history that includes Colonoscopy; Abdomen surgery; eye surgery; skin biopsy; Hysterectomy (); Cholecystectomy; joint replacement (4970); and Umbilical hernia repair (2018). Restrictions  Restrictions/Precautions  Restrictions/Precautions: Fall Risk, Swallowing - Thickened Liquids  Required Braces or Orthoses?: No  Position Activity Restriction  Other position/activity restrictions: Arash Gloria is a 70 y.o. female admitted to [facility] on 10/27 after being found on the floor unconscious in home. Per documentation, EMS noted incontinence and facial droop as well as gross right sided flaccidy, non-verbal but conscious. Has a history of TIA per , and is on plavix. 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 Pt given tPA at triage, transferred to [facility] for possible thrombectomy, unable to complete due to anatomy. Subjective   General  Chart Reviewed: Yes  Patient assessed for rehabilitation services?: Yes  Response to previous treatment: Patient with no complaints from previous session  Family / Caregiver Present: No  Diagnosis: CVA  Subjective  Subjective: pt in chair upon arrival, pt smiling and happy. Agreeable to OT/PT cotx as evidenced by smiling and attempting to lean forward. Therapy goal for the day was to assist pt into shower this date. Unable to get pt into shower this date    Pre Treatment Pain Screening  Intervention List: Patient able to continue with treatment;Nurse/Physician notified  Comments / Details: pt grimacing some and grasping R UE  Pain Assessment  Patient Currently in Pain:  (pt grasping R UE and moaning with pain this date, therapists attempting to support R UE during session)  Bial[place] Pain Rating: Hurts little more  Vital Signs  Patient Observation  Observations: pt on 3 L O2 at beginning of session, Pt's SPO2 at 87% prior to ax. Pt bumped to 4 L O2 due to desaturation   Orientation  Orientation  Overall Orientation Status:  (unable to assess due to aphasia)  Objective    ADL  Grooming: Dependent/Total (face washing, MaxA to dependent for sitting balance seated EOB, pt able to use L UE to wash face this date)  UE Bathing: Dependent/Total (MaxA to dependent for balance seated EOB, max verbal cues for throughness with bathing)  LE Bathing: Dependent/Total (dependent for LB bathing supine in bed, attempting wilner care in stance in steady, MaxA for balance in steady)  UE Dressing: Dependent/Total (donning shirt seated EOB, Dependent to maxA for balance. Pt required max verbal cues to participate in dressing this date)  LE Dressing: Dependent/Total (donning pants and brief)  Additional Comments: Therapist attempting to assist pt onto shower wheelchair from [address] [place] [address]. Unable to complete shower transfer to wheelchair shower chair due to safety with sitting on chair. ADL bathing completed seated EOB to address sitting balance and core strength        Balance  Sitting Balance: Dependent/Total (total to maxA)  Standing Balance: Dependent/Total (MaxA of 3, assistance to guard R UE. pt unable to stand with trunk extension. pt feeling fatigued.  SPO2 at 87% after standing. )  Standing Balance  Time: ~1 minute x 3  Activity: stance in arjun steady for transfers x 2, wilner care in place; Left in bed;Nurse notified;Call light within reach; Patient at risk for falls; Bed alarm in place (pt left with physical therapy.  OT to return to assist pt to w/c)          Plan   Plan  Times per week: 60 min; 5-7x per week  Times per day: Daily  Current Treatment Recommendations: Strengthening, Patient/Caregiver Education & Training, ROM, Balance Training, Neuromuscular Re-education, Cognitive Reorientation, Endurance Training, Safety Education & Training, Self-Care / ADL, Wheelchair Mobility Training  Goals  Short term goals  Time Frame for Short term goals: 10 days   Short term goal 1: Transfer for ADL completion w/ Max A---maxA of 3, pt fatigued and attempting to rest L forearm and elbow on steady  Short term goal 2: UB dressing w/ Max A -- dependent due to decreased balance seated EOB  Short term goal 3: Toileting w/ Max A---depenedent for wilner care in steady  Short term goal 4: Bathing w/ Max A-- dependent for bathing this date due to decreased balance seated EOB  Short term goal 5: LB dressing w/ Max A and AE as needed -- dependent this date due to fatigue, dressing supine in bed  Long term goals  Time Frame for Long term goals : 3 weeks  Long term goal 1: Transfer for ADL completion w/ Mod A  Long term goal 2: UB dressing w/ Mod A  Long term goal 3: Toileting w/ Mod A  Long term goal 4: Bathing w/ Mod A  Long term goal 5: LB dressing w/ Mod A and AE as needed   Patient Goals   Patient goals : Pt unable to state goals        Therapy Time   Individual Concurrent Group Co-treatment   Time In       0830   Time Out       0915   Minutes       45     Timed Code Treatment Minutes:  45 Minutes    Total Treatment Minutes:  45 minutes      Second Session Therapy Time:   Individual Concurrent Group Co-treatment   Time In      1030   Time Out      1058   Minutes      28     Timed Code Treatment Minutes: 28 minutes + 45 minutes    Total Treatment Minutes:  73 minutes    JOSE Beck/TIFFANIE [id number]

## 2018-11-14 NOTE — PLAN OF CARE
Problem: SKIN INTEGRITY  Goal: STG - Patient demonstrates preventative skin care measures  Patient needs to be turned every 2 hours due to potential skin breakdown

## 2018-11-14 NOTE — PROGRESS NOTES
Physical Therapy  Facility/Department: [facility] ACUTE REHAB UNIT  Daily Treatment Note  NAME: Irina Garner  :   MRN: [id number]    Date of Service: 2018    Discharge Recommendations:  Continue to assess pending progress, Patient would benefit from continued therapy after discharge   PT Equipment Recommendations  Equipment Needed: Yes  Other: TBD with progress    Patient Diagnosis(es): There were no encounter diagnoses. has a past medical history of Acute CVA (cerebrovascular accident) ([facility] (acute [facility]) ([facility] Arthritis; Cancer [facility]); COPD (chronic obstructive pulmonary disease) ([facility] GERD (gastroesophageal reflux disease); Hyperlipidemia; Hypertension; Impaired glucose tolerance; Pneumonia; PONV (postoperative nausea and vomiting); Pulmonary emboli ([facility] and Sleep apnea. has a past surgical history that includes Colonoscopy; Abdomen surgery; eye surgery; skin biopsy; Hysterectomy (); Cholecystectomy; joint replacement (); and Umbilical hernia repair (2018). Restrictions  Restrictions/Precautions  Restrictions/Precautions: Fall Risk, Swallowing - Thickened Liquids  Required Braces or Orthoses?: No  Position Activity Restriction  Other position/activity restrictions: Deng Abreu is a 70 y.o. female admitted to [facility] on 10/27 after being found on the floor unconscious in home. Per documentation, EMS noted incontinence and facial droop as well as gross right sided flaccidy, non-verbal but conscious. Has a history of TIA per , and is on plavix. 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 Pt given tPA at triage, transferred to [facility] for possible thrombectomy, unable to complete due to anatomy. Subjective   General  Chart Reviewed: Yes  Additional Pertinent Hx: COPD, HTN, cancer, joint replacement  Response To Previous Treatment: Patient with no complaints from previous session.   Family / Caregiver Present: No  Referring Practitioner:  62     Second Session Therapy Time:   Individual Concurrent Group Co-treatment   Time In  0927        Time Out  0935        Minutes  8          Timed Code Treatment Minutes:  65    Total Treatment Minutes:  Peterson, Student PT    Therapist was present, directed the patient's care, made skilled judgement, and was responsible for assessment and treatment of the patient.  Co-signed and supervised by:  Mauro Cleveland DPT [id number]

## 2018-11-15 LAB
ANION GAP SERPL CALCULATED.3IONS-SCNC: 9 MMOL/L (ref 3–16)
BUN BLDV-MCNC: 19 MG/DL (ref 7–20)
CALCIUM SERPL-MCNC: 8.5 MG/DL (ref 8.3–10.6)
CHLORIDE BLD-SCNC: 102 MMOL/L (ref 99–110)
CO2: 31 MMOL/L (ref 21–32)
CREAT SERPL-MCNC: 0.7 MG/DL (ref 0.6–1.2)
GFR AFRICAN AMERICAN: >60
GFR NON-AFRICAN AMERICAN: >60
GLUCOSE BLD-MCNC: 111 MG/DL (ref 70–99)
HCT VFR BLD CALC: 42.2 % (ref 36–48)
HEMOGLOBIN: 13.9 G/DL (ref 12–16)
MCH RBC QN AUTO: 30.4 PG (ref 26–34)
MCHC RBC AUTO-ENTMCNC: 33 G/DL (ref 31–36)
MCV RBC AUTO: 92.1 FL (ref 80–100)
PDW BLD-RTO: 14.9 % (ref 12.4–15.4)
PLATELET # BLD: 274 K/UL (ref 135–450)
PMV BLD AUTO: 8.3 FL (ref 5–10.5)
POTASSIUM SERPL-SCNC: 5.1 MMOL/L (ref 3.5–5.1)
RBC # BLD: 4.59 M/UL (ref 4–5.2)
SODIUM BLD-SCNC: 142 MMOL/L (ref 136–145)
WBC # BLD: 8.6 K/UL (ref 4–11)

## 2018-11-15 PROCEDURE — 97530 THERAPEUTIC ACTIVITIES: CPT

## 2018-11-15 PROCEDURE — 92507 TX SP LANG VOICE COMM INDIV: CPT

## 2018-11-15 PROCEDURE — 94640 AIRWAY INHALATION TREATMENT: CPT

## 2018-11-15 PROCEDURE — 6360000002 HC RX W HCPCS: Performed by: PHYSICAL MEDICINE & REHABILITATION

## 2018-11-15 PROCEDURE — 6370000000 HC RX 637 (ALT 250 FOR IP): Performed by: PHYSICAL MEDICINE & REHABILITATION

## 2018-11-15 PROCEDURE — 92526 ORAL FUNCTION THERAPY: CPT

## 2018-11-15 PROCEDURE — 36415 COLL VENOUS BLD VENIPUNCTURE: CPT

## 2018-11-15 PROCEDURE — 80048 BASIC METABOLIC PNL TOTAL CA: CPT

## 2018-11-15 PROCEDURE — 97535 SELF CARE MNGMENT TRAINING: CPT

## 2018-11-15 PROCEDURE — 94760 N-INVAS EAR/PLS OXIMETRY 1: CPT

## 2018-11-15 PROCEDURE — 1280000000 HC REHAB R&B

## 2018-11-15 PROCEDURE — 85027 COMPLETE CBC AUTOMATED: CPT

## 2018-11-15 RX ORDER — CIPROFLOXACIN 500 MG/1
500 TABLET, FILM COATED ORAL EVERY 12 HOURS SCHEDULED
Status: COMPLETED | OUTPATIENT
Start: 2018-11-15 | End: 2018-11-22

## 2018-11-15 RX ADMIN — ACETAMINOPHEN 650 MG: 325 TABLET, FILM COATED ORAL at 13:35

## 2018-11-15 RX ADMIN — FUROSEMIDE 20 MG: 20 TABLET ORAL at 08:21

## 2018-11-15 RX ADMIN — Medication 2 PUFF: at 19:59

## 2018-11-15 RX ADMIN — ATORVASTATIN CALCIUM 80 MG: 80 TABLET, FILM COATED ORAL at 20:36

## 2018-11-15 RX ADMIN — ASPIRIN 325 MG: 325 TABLET, DELAYED RELEASE ORAL at 08:21

## 2018-11-15 RX ADMIN — LOSARTAN POTASSIUM 50 MG: 25 TABLET ORAL at 08:21

## 2018-11-15 RX ADMIN — METHYLPHENIDATE HYDROCHLORIDE 10 MG: 10 TABLET ORAL at 13:20

## 2018-11-15 RX ADMIN — MICONAZOLE NITRATE: 2 POWDER TOPICAL at 20:40

## 2018-11-15 RX ADMIN — MICONAZOLE NITRATE: 2 POWDER TOPICAL at 06:43

## 2018-11-15 RX ADMIN — TRAMADOL HYDROCHLORIDE 50 MG: 50 TABLET, FILM COATED ORAL at 18:31

## 2018-11-15 RX ADMIN — TRAMADOL HYDROCHLORIDE 50 MG: 50 TABLET, FILM COATED ORAL at 11:28

## 2018-11-15 RX ADMIN — ENOXAPARIN SODIUM 40 MG: 100 INJECTION SUBCUTANEOUS at 20:36

## 2018-11-15 RX ADMIN — FLUOXETINE HYDROCHLORIDE 20 MG: 20 CAPSULE ORAL at 08:21

## 2018-11-15 RX ADMIN — CIPROFLOXACIN HYDROCHLORIDE 500 MG: 500 TABLET, FILM COATED ORAL at 20:36

## 2018-11-15 RX ADMIN — TRAZODONE HYDROCHLORIDE 50 MG: 50 TABLET ORAL at 20:36

## 2018-11-15 RX ADMIN — METHYLPHENIDATE HYDROCHLORIDE 10 MG: 10 TABLET ORAL at 08:21

## 2018-11-15 RX ADMIN — CIPROFLOXACIN HYDROCHLORIDE 250 MG: 250 TABLET, FILM COATED ORAL at 08:21

## 2018-11-15 ASSESSMENT — PAIN SCALES - GENERAL
PAINLEVEL_OUTOF10: 0
PAINLEVEL_OUTOF10: 7
PAINLEVEL_OUTOF10: 0
PAINLEVEL_OUTOF10: 10
PAINLEVEL_OUTOF10: 10

## 2018-11-15 NOTE — PROGRESS NOTES
ACUTE REHAB UNIT  SPEECH/LANGUAGE PATHOLOGY      [x] Daily  [] Weekly Care Conference Note  [] Discharge    Patient:Magy Chadwick     RGR:0/46/1913  OAF:[id number]  Room #: MRX-5488/6227-02   Rehab Dx/Hx: Acute ischemic stroke [facility]) [I63.9]  Date of Admit: 11/1/2018      Precautions: falls  Home situation: Per chart review, pt lives at home with spouse. ST Dx: Severe expressive non-fluent aphasia and severe receptive aphasia, Oropharyngeal Dysphagia, Oral Apraxia     Daily Treatment Info:   Date: 11/15/2018   Tx session 1 Tx session 2   Pain Pt pointing to \"no pain\" provided with written f/2. However pt appeared uncomfortable/ in pain when lying in bed. Unable to determine with written choices, attempted to reposition. Pt pointed to written 'pain'. Provided with written cues pt pointed to legs and R leg. Provided with faces rating scale pt indicated 7/10. Notified RN, pain mediation administered. Subjective     Pt found lying back in bed with 4 L of O2 in place with O2 stats at 90-91%. Pt alert and cooperative during session. Pt found sitting upright in wheelchair in room. Pt alert, cooperative and interactive with SLP. Pt on 4 L of O2 with states 91-92% throughout session, during trials of soft regular solids and following trials. Objective:  Goals     Pt will tolerate trials of soft regular solids with no overt s/s of aspiration, significant pocketing on R or anterior bolus loss our of R side. Goal not targeted this session. Oral care completed, SLP removed minimal stasis from R lateral sulci with moistened toothette in mouthwash. Soft regular trial (hamburger)-  Pt impulsive taking large, successive bites required verbal, written and tactile cues to place hamburger down. Pt with prolonged but effective mastication and adequate oral clearance with use of Independent lingual sweep around gumline in lateral sulci.  No overt s/s of mastication but slight labored breathing/ sighing during to point to no more. Provided with f/2 pt pointed to wanting \"more food\" but then was able to gesture with hand that she was done. Pt will repeat vowels,and CV bilabial speech sounds with 70% accuracy mod-max cues. Goal not targeted this session. Goal not targeted this session. Pt will complete one-step commands with 70% acc, with < max cues. 1-step directions- 6/10 (60%) with visual cues (pt completed 1/10 Independently)  Pt able to follow functional commands- (i.e. Wipe R side of face during trial assessments). Other areas targeted:     Education:   Unable to demonstrate comprehension of basic education provided throughout session. Unable to demonstrate comprehension of basic education provided throughout session. Safety Devices: [x] Call light within reach  [x] Chair alarm activated  [] Bed alarm activated  [] Other:    [x] Call light within reach  [x] Chair alarm activated  [] Bed alarm activated  [x] Other: Left in presence of OT. Assessment:   Speech Therapy Diagnosis  Cognitive Diagnosis: OSIRIS due to severe aphasia, will continue to assess as able. Aphasia Diagnosis: Severe expressive non-fluent aphasia and severe receptive aphasia   Communication Diagnosis: Severe due to global aphasia     Current Diet Order: DIET DYSPHAGIA III Advanced Diet, [place] Thick; No Drinking Straw (Allow thin water between meals)    Recommended Form of Meds: Whole in puree *ASSIST FEED due to impulsivity*.   Compensatory Swallowing Strategies: Small bites/sips, Eat/Feed slowly, Check for pocketing of food on the Right, External pacing, Remain upright for 30-45 minutes after meals, Upright as possible for all oral intake (ORAL CARE following meals)     Plan:     Frequency:  5days/week   60 minutes/day     Discharge Recommendations:   Barriers: severe communication deficits   Discharge Recommendations:  [] Home independently  [] Home with assistance []  24 hour supervision  [x] SNF [] Other:  Continued SLP Treatment:  [x] Yes [] No [] TBD based on progress while on ARU [] Vital Stim indicated [] Other:   Estimated discharge date: 11/23/18     Type of Total Treatment Minutes   Session 1   Session 2   Time In 0830 1115   Time Out 0900 1145   Timed Code Minutes  0 0   Individual Treatment Minutes  30 30   Co-Treatment Minutes      Group Treatment Minutes      Concurrent Treatment Minutes        TOTAL DAILY MINUTES:  60    Electronically Signed by     Max RUIZ  [facility]-SLP #[id number] 11/15/2018 7:47 AM  Speech-Language Pathologist

## 2018-11-15 NOTE — PROGRESS NOTES
coordination  Assessment: Pt continues to demonstrate increased R push/lean, however today pt also demonstrating forward lean in the stedy. Treatment Diagnosis: decreased functional mobility, endurance, and balance  Prognosis: Guarded  Patient Education: not pushing with LUE  REQUIRES PT FOLLOW UP: Yes  Activity Tolerance  Activity Tolerance: Patient Tolerated treatment well     G-Code     OutComes Score                                                    AM-PAC Score             Goals  Short term goals  Time Frame for Short term goals: 1-2 weeks  Short term goal 1: Pt will perform bed mobility maxA of 1  Short term goal 2: Pt will perform all transfers modA of 2  Short term goal 3: Pt will ambulate 8' with modA of 2 and AAD  Short term goal 4: Pt will negotiate 1 step with maxA of 2 and AAD  Short term goal 5: Propel w/c 48' with maxA  Long term goals  Time Frame for Long term goals : 2-3 weeks  Long term goal 1: Pt will perform bed mobility Diana of 1  Long term goal 2: Pt will perform all transfers modA  Long term goal 3: Pt will ambulate 48' with AAD and modA  Long term goal 4: Pt will negotiate 2 steps with AAD and maxA  Long term goal 5: Propel w/c 48' Diana  Patient Goals   Patient goals : unable to assess due to global aphasia    Plan    Plan  Times per week: 60 minutes a day 5 days a week  Current Treatment Recommendations: Strengthening, ROM, Balance Training, Functional Mobility Training, Transfer Training, Endurance Training, Wheelchair Mobility Training, Gait Training, Stair training, Neuromuscular Re-education, Safety Education & Training, Patient/Caregiver Education & Training, Equipment Evaluation, Education, & procurement, Manual Therapy - Soft Tissue Mobilization  Safety Devices  Type of devices:  All fall risk precautions in place, Patient at risk for falls, Nurse notified, Call light within reach, Chair alarm in place, Gait belt, Left in chair  Restraints  Initially in place: No     Therapy Time

## 2018-11-15 NOTE — PLAN OF CARE
Sleeping during time that meds due. Woke patient, given meds crushed in applesauce. Answered to name & occasionally followed commands but at times needs visual cues (for \"wiggle your toes, take a deep breath, etc). When assessing R leg, seemed to be tight & painful to the touch when moved. Given prn zaniflex to see if this would help.  Also discussed the fact that she was just ordered an atb ffor a UTI    Problem: Falls - Risk of:  Goal: Will remain free from falls  Will remain free from falls   Outcome: Met This Shift    Goal: Absence of physical injury  Absence of physical injury   Outcome: Met This Shift      Problem: IP BLADDER/VOIDING  Goal: LTG - patient will achieve acceptable level of continence  Outcome: Not Met This Shift      Problem: IP BOWEL ELIMINATION  Goal: LTG - patient will have regular and routine bowel evacuation  Outcome: Ongoing      Problem: IP BREATHING  Goal: STG - respiratory rate and effort will be within normal limits for the patient  Outcome: Met This Shift    Intervention: PROVIDE OXYGEN THERAPY AS ORDERED  Discussed plan to ween if able      Problem: SAFETY  Goal: STG - Patient uses call light consistently to request assistance with transfers  Outcome: Ongoing      Problem: SKIN INTEGRITY  Goal: STG - Patient demonstrates preventative skin care measures  Outcome: Ongoing      Problem: PAIN  Goal: STG - Patient will increase activity level  Outcome: Ongoing      Problem: Neurological  Goal: Maximum potential motor/sensory/cognitive function  Outcome: Ongoing      Problem: ACTIVITY INTOLERANCE/IMPAIRED MOBILITY  Goal: Mobility/activity is maintained at optimum level for patient  Outcome: Ongoing      Problem: COMMUNICATION IMPAIRMENT  Goal: Ability to express needs and understand communication  Outcome: Ongoing
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
## 2018-11-15 NOTE — PROGRESS NOTES
Occupational Therapy  Facility/Department: [facility] ACUTE REHAB UNIT  Daily Treatment Note  NAME: Shae Morales  :   MRN: [id number]    Date of Service: 11/15/2018    Discharge Recommendations:  Continue to assess pending progress  OT Equipment Recommendations  Other: continue to assess    Patient Diagnosis(es): There were no encounter diagnoses. has a past medical history of Acute CVA (cerebrovascular accident) ([facility] [address] [facility] SHE[facility] (acute kidney injury) ([facility] Arthritis; Cancer [facility]); COPD (chronic obstructive pulmonary disease) ([facility] GERD (gastroesophageal reflux disease); Hyperlipidemia; Hypertension; Impaired glucose tolerance; Pneumonia; PONV (postoperative nausea and vomiting); Pulmonary emboli ([facility] and Sleep apnea. has a past surgical history that includes Colonoscopy; Abdomen surgery; eye surgery; skin biopsy; Hysterectomy (); Cholecystectomy; joint replacement (); and Umbilical hernia repair (2018). Restrictions  Restrictions/Precautions  Restrictions/Precautions: Fall Risk, Swallowing - Thickened Liquids  Required Braces or Orthoses?: No  Position Activity Restriction  Other position/activity restrictions: Montez Ram is a 70 y.o. female admitted to [facility] on 10/27 after being found on the floor unconscious in home. Per documentation, EMS noted incontinence and facial droop as well as gross right sided flaccidy, non-verbal but conscious. Has a history of TIA per , and is on plavix. 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 Pt given tPA at triage, transferred to [facility] for possible thrombectomy, unable to complete due to anatomy. Subjective   General  Chart Reviewed: Yes  Patient assessed for rehabilitation services?: Yes  Response to previous treatment: Patient with no complaints from previous session  Family / Caregiver Present: No  Diagnosis: CVA  Subjective  Subjective: Pt supine in bed upon entry, pleasant and agreeable to OT session.    General Comment  Comments: Pain Assessment  Patient Currently in Pain: No  Vital Signs  Patient Currently in Pain: No      Objective    ADL  LE Dressing: Dependent/Total (To don pants/brief while supine in bed)  Toileting: Dependent/Total (pt incontinent of urine in brief)  Additional Comments: Completed pericare/LB dressing while supine in bed      Balance  Sitting Balance: Dependent/Total (ranged from total to Max A )  Standing Balance: Dependent/Total (Max A x1 at [facility])  Standing Balance  Time: 1 min x3  Activity: transfers in stedy   Sit to stand: Dependent/Total  Stand to sit: Dependent/Total  Comment: Max A x1 in stedy    Bed mobility  Supine to Sit: Maximum assistance  Sit to Supine: Dependent/Total (Max A x2)  Scooting: Dependent/Total    Transfers  Sit to stand: Dependent/Total  Stand to sit: Dependent/Total      Second Session: Pt supine in bed upon entry attempting to remove brief. Pt incontinent of a large amount of urine. Pt rolled L/R to assist w/ pericare, HHA w/ R roll, Mod A for L roll. Dependent to don new brief/pants. Supine>sit=Max A. Pt transferred to w/c via stedy w/ Max A x1 for sit>stand, assist x2 for transfer to . Pt transported to therapy gym. Performed standing in parallel bars X ~5-10 seconds X 3 trials with dependent assist of 2-3 with R knee block, R foot block, and R hip abduction block. Pt still pulling into RLE flexion synergy, despite RLE block. Pt with significant trunk flexion and required assist of 3 for trunk extension without success. Pt propelled w/c 126' with Diana and initial HOHA at LUE for propulsion. Pt remained in w/c with alarm on and all needs in reach. This writer applied kinesotape to R shoulder d/t subluxation of shoulder. Assessment   Performance deficits / Impairments: Decreased functional mobility ; Decreased endurance;Decreased coordination;Decreased ADL status; Decreased ROM; Decreased balance;Decreased strength;Decreased safe awareness;Decreased high-level IADLs;Decreased 4: Bathing w/ Mod A  Long term goal 5: LB dressing w/ Mod A and AE as needed   Patient Goals   Patient goals : Pt unable to state goals        Therapy Time   Individual Concurrent Group Co-treatment   Time In       0900   Time Out       0930   Minutes       30        Timed Code Treatment Minutes:  30     Second Session Therapy Time:   Individual Concurrent Group Co-treatment   Time In    1400   Time Out    1445   Minutes    45     Timed Code Treatment Minutes:  30 + 45     Total Treatment Minutes:  75 minutes       Gabriela Castle, CHRISTOPHER Castle OTYONATHAN/TIFFANIE, [place] #[id number]
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
## 2018-11-16 LAB
ORGANISM: ABNORMAL
URINE CULTURE, ROUTINE: ABNORMAL
URINE CULTURE, ROUTINE: ABNORMAL

## 2018-11-16 PROCEDURE — 97535 SELF CARE MNGMENT TRAINING: CPT

## 2018-11-16 PROCEDURE — 92507 TX SP LANG VOICE COMM INDIV: CPT

## 2018-11-16 PROCEDURE — 6360000002 HC RX W HCPCS: Performed by: PHYSICAL MEDICINE & REHABILITATION

## 2018-11-16 PROCEDURE — 97530 THERAPEUTIC ACTIVITIES: CPT

## 2018-11-16 PROCEDURE — 1280000000 HC REHAB R&B

## 2018-11-16 PROCEDURE — 6370000000 HC RX 637 (ALT 250 FOR IP): Performed by: PHYSICAL MEDICINE & REHABILITATION

## 2018-11-16 PROCEDURE — 92526 ORAL FUNCTION THERAPY: CPT

## 2018-11-16 PROCEDURE — 97112 NEUROMUSCULAR REEDUCATION: CPT

## 2018-11-16 PROCEDURE — 94760 N-INVAS EAR/PLS OXIMETRY 1: CPT

## 2018-11-16 PROCEDURE — 94640 AIRWAY INHALATION TREATMENT: CPT

## 2018-11-16 RX ADMIN — ASPIRIN 325 MG: 325 TABLET, DELAYED RELEASE ORAL at 11:48

## 2018-11-16 RX ADMIN — TRAMADOL HYDROCHLORIDE 50 MG: 50 TABLET, FILM COATED ORAL at 07:57

## 2018-11-16 RX ADMIN — FLUOXETINE HYDROCHLORIDE 20 MG: 20 CAPSULE ORAL at 11:49

## 2018-11-16 RX ADMIN — CIPROFLOXACIN HYDROCHLORIDE 500 MG: 500 TABLET, FILM COATED ORAL at 11:48

## 2018-11-16 RX ADMIN — ATORVASTATIN CALCIUM 80 MG: 80 TABLET, FILM COATED ORAL at 22:48

## 2018-11-16 RX ADMIN — FUROSEMIDE 20 MG: 20 TABLET ORAL at 11:48

## 2018-11-16 RX ADMIN — ENOXAPARIN SODIUM 40 MG: 100 INJECTION SUBCUTANEOUS at 22:48

## 2018-11-16 RX ADMIN — LOSARTAN POTASSIUM 50 MG: 25 TABLET ORAL at 11:49

## 2018-11-16 RX ADMIN — CIPROFLOXACIN HYDROCHLORIDE 500 MG: 500 TABLET, FILM COATED ORAL at 22:48

## 2018-11-16 RX ADMIN — TRAMADOL HYDROCHLORIDE 50 MG: 50 TABLET, FILM COATED ORAL at 12:58

## 2018-11-16 RX ADMIN — MICONAZOLE NITRATE: 2 POWDER TOPICAL at 22:52

## 2018-11-16 RX ADMIN — Medication 2 PUFF: at 20:59

## 2018-11-16 ASSESSMENT — PAIN SCALES - GENERAL
PAINLEVEL_OUTOF10: 10
PAINLEVEL_OUTOF10: 10

## 2018-11-16 NOTE — PROGRESS NOTES
Pain Assessment  Patient Currently in Pain: No  Vital Signs  Patient Currently in Pain: No      Objective    ADL  LE Dressing: Dependent/Total (to don pants/breif while supine in bed)  Toileting: Dependent/Total (pt incontinent of urine in brief)  Additional Comments: Pt seated in recliner upon entry, use of stedy to transport pt to EOB. Pt completed LB dressing while supine in bed w/ Max A to direct ADL + Mod A to assist w/ rolling     Standing Balance  Sit to stand: Dependent/Total  Stand to sit: Dependent/Total     Bed mobility  Supine to Sit: Dependent/Total (Max A x2)  Sit to Supine: Dependent/Total (dependent of 2)  Scooting: Dependent/Total    Transfers  Sit to stand: Dependent/Total  Stand to sit: Dependent/Total  Transfer Comments: Use of stedy. Recliner>EOB>w/c    Additional activity: Pt completed 3.5 min in seated stepper Mod A x2 to assist pt w/ stepping sequence. R UE not used w/ stepper d/t increased pain w/ ROM. Second Session: Pt supine in bed upon entry, agreeable to OT session, son in room. Pt denies pain at this time. Max A to don pants while supine (second person assist w/ rolling). Supine>sit=Max A. Pt seated EOB w/ Max A for sitting balance d/t L lateral lean. Pt transferred Max A x1 for sit>stand w/ second person assist to guard R arm in stedy. Pt transferred to w/c, transported to gym. Pt completed 2 sit>stand transfers at ballet bar. Max A for extension of R LE + Max A for upright posture. Pt stood for 2 min + 2 min at ballet bar, rest breaks incorporated. Pt transported back to room at end of session, chair alarm in place 2L O2 NC, needs within reach. Assessment   Performance deficits / Impairments: Decreased functional mobility ; Decreased endurance;Decreased coordination;Decreased ADL status; Decreased ROM; Decreased balance;Decreased strength;Decreased safe awareness;Decreased high-level IADLs;Decreased cognition;Decreased fine motor control  Assessment:  Pt continues to require
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
## 2018-11-16 NOTE — PROGRESS NOTES
ACUTE REHAB UNIT  SPEECH/LANGUAGE PATHOLOGY      [x] Daily  [] Weekly Care Conference Note  [] Discharge    Patient:Magy Kumar     SBA:[id number]  HTY:[id number]  Room #: DFH-5568/1506-35   Rehab Dx/Hx: Acute ischemic stroke [facility]) [I63.9]  Date of Admit: 11/1/2018      Precautions: falls  Home situation: Per chart review, pt lives at home with spouse. ST Dx: Severe expressive non-fluent aphasia and severe receptive aphasia, Oropharyngeal Dysphagia, Oral Apraxia     Daily Treatment Info:   Date: 11/16/2018   Tx session 1 Tx session 2   Pain Pt pointed to pain provided with written choices. Provided with faces pt indicated 4-6 pain. Provided with written choices pt indicated shoulder. Pt did not appear to be in pain. No facial grimacing noted. Pt pointed to pain provided with written choices. Indicated a pain level of 7-9 provided with faces rating scale. With written options and SLP anticipating pt's needs pt indicated should pain in her R. RN notified, pain medications administered. Subjective     Pt found sitting upright in wheelchair alert and interactive with SLP. Pt continues on 4 L of O2 via NC. Congested cough noted without po. Pt found sitting upright in wheelchair with pt's friend initially present. Pt's family ( and son) present partway through session. Objective:  Goals     Pt will tolerate trials of soft regular solids with no overt s/s of aspiration, significant pocketing on R or anterior bolus loss our of R side. Pt self feeding (required tactile cues to reposition spoon in L hand). Pt seemingly tolerating dysphagia III solids with encouragement of finger sweeps and liquid alternates to aid in oral clearance and minimize pocketing in L lateral sulcus and stasis on R lingual surface. Pt tolerated current diet with nectar thick liquids with no overt s/s of aspiration.     X 1 delayed throat clear noted when nectars provided in combination with other textures, suspect due to

## 2018-11-16 NOTE — PROGRESS NOTES
upon arrival  Pain Screening  Patient Currently in Pain: Other (comment) (pt unable to express but moaning when cleaning out R hand and moving RLE)  Vital Signs  Patient Currently in Pain: Other (comment) (pt unable to express but moaning when cleaning out R hand and moving RLE)       Orientation     Cognition      Objective   Bed mobility  Rolling to Left: Maximum assistance  Rolling to Right: Minimal assistance (HHA using LUE and LLE)  Supine to Sit: Dependent/Total (maxA of 2)  Sit to Supine: Dependent/Total (dependent of 2)  Transfers  Sit to Stand: Dependent/Total (maxA of 1 with stedy and OT to guard RUE)  Stand to sit: Dependent/Total (maxA of 1 with stedy and OT to guard RUE)                     Comment: 1st session: Transferred recliner to bed as mentioned above. Changed pts brief and pants in bed with bed mobility as mentioned above. Transfer from bed to  via stedy as mentioned above. Performed seated stepper for 3:30 with LUE and BLE with facilitation at RLE for hip IR and knee extension. Pt returned to room in  where she remained with alarm on and all needs in reach. 2nd session: Pt sitting up in bed upon arrival with her son in the room. Rolling to L modA of 2, to the R Diana HHA of LUE and use of LLE to don pants. Supine to sit maxA of 2. Sit to stand with stedy maxA of 1 with OT supporting RUE. Sit to stand at ballet bar maxA of 1 with givmor sling on RUE. MaxA of 2 to maintain standing to correct R lean/push, upright posture, and blocking at RLE. Pt remained in standing ~2 minutes. MaxA of 2 to control stand to sit. Sit to stand maxA of 1 and standing for ~2 minutes with same assist as mentioned above. Returned pt to room in  where she remained with alarm on, son in the room, and all needs in reach. Assessment   Body structures, Functions, Activity limitations: Decreased functional mobility ; Decreased ROM; Decreased strength;Decreased balance;Decreased endurance;Decreased cognition;Decreased coordination  Assessment: Pt demonstrating improved ability to self-correct R push/lean in standing with vc and tc. Pt also demonstrating increased time in standing at bars today. Treatment Diagnosis: decreased functional mobility, endurance, and balance  Prognosis: Guarded  REQUIRES PT FOLLOW UP: Yes  Activity Tolerance  Activity Tolerance: Patient Tolerated treatment well     G-Code     OutComes Score                                                    AM-PAC Score             Goals  Short term goals  Time Frame for Short term goals: 1-2 weeks  Short term goal 1: Pt will perform bed mobility maxA of 1  Short term goal 2: Pt will perform all transfers modA of 2  Short term goal 3: Pt will ambulate 8' with modA of 2 and AAD  Short term goal 4: Pt will negotiate 1 step with maxA of 2 and AAD  Short term goal 5: Propel w/c 48' with maxA  Long term goals  Time Frame for Long term goals : 2-3 weeks  Long term goal 1: Pt will perform bed mobility Diana of 1  Long term goal 2: Pt will perform all transfers modA  Long term goal 3: Pt will ambulate 48' with AAD and modA  Long term goal 4: Pt will negotiate 2 steps with AAD and maxA  Long term goal 5: Propel w/c 48' Diana  Patient Goals   Patient goals : unable to assess due to global aphasia    Plan    Plan  Times per week: 60 minutes a day 5 days a week  Current Treatment Recommendations: Strengthening, ROM, Balance Training, Functional Mobility Training, Transfer Training, Endurance Training, Wheelchair Mobility Training, Gait Training, Stair training, Neuromuscular Re-education, Safety Education & Training, Patient/Caregiver Education & Training, Equipment Evaluation, Education, & procurement, Manual Therapy - Soft Tissue Mobilization  Safety Devices  Type of devices:  All fall risk precautions in place, Patient at risk for falls, Nurse notified, Call light within reach, Chair alarm in place, Gait belt, Left in chair  Restraints  Initially in place: No

## 2018-11-17 PROCEDURE — 1280000000 HC REHAB R&B

## 2018-11-17 PROCEDURE — 94760 N-INVAS EAR/PLS OXIMETRY 1: CPT

## 2018-11-17 PROCEDURE — 94640 AIRWAY INHALATION TREATMENT: CPT

## 2018-11-17 PROCEDURE — 2700000000 HC OXYGEN THERAPY PER DAY

## 2018-11-17 PROCEDURE — 6370000000 HC RX 637 (ALT 250 FOR IP): Performed by: PHYSICAL MEDICINE & REHABILITATION

## 2018-11-17 PROCEDURE — 6360000002 HC RX W HCPCS: Performed by: PHYSICAL MEDICINE & REHABILITATION

## 2018-11-17 RX ADMIN — CIPROFLOXACIN HYDROCHLORIDE 500 MG: 500 TABLET, FILM COATED ORAL at 21:39

## 2018-11-17 RX ADMIN — ATORVASTATIN CALCIUM 80 MG: 80 TABLET, FILM COATED ORAL at 21:39

## 2018-11-17 RX ADMIN — FLUOXETINE HYDROCHLORIDE 20 MG: 20 CAPSULE ORAL at 10:23

## 2018-11-17 RX ADMIN — ASPIRIN 325 MG: 325 TABLET, DELAYED RELEASE ORAL at 10:24

## 2018-11-17 RX ADMIN — TIOTROPIUM BROMIDE 18 MCG: 18 CAPSULE ORAL; RESPIRATORY (INHALATION) at 09:25

## 2018-11-17 RX ADMIN — METHYLPHENIDATE HYDROCHLORIDE 10 MG: 10 TABLET ORAL at 15:02

## 2018-11-17 RX ADMIN — Medication 2 PUFF: at 09:25

## 2018-11-17 RX ADMIN — CIPROFLOXACIN HYDROCHLORIDE 500 MG: 500 TABLET, FILM COATED ORAL at 10:23

## 2018-11-17 RX ADMIN — FUROSEMIDE 20 MG: 20 TABLET ORAL at 10:23

## 2018-11-17 RX ADMIN — Medication 2 PUFF: at 19:26

## 2018-11-17 RX ADMIN — MICONAZOLE NITRATE: 2 POWDER TOPICAL at 21:41

## 2018-11-17 RX ADMIN — ENOXAPARIN SODIUM 40 MG: 100 INJECTION SUBCUTANEOUS at 21:39

## 2018-11-17 RX ADMIN — METHYLPHENIDATE HYDROCHLORIDE 10 MG: 10 TABLET ORAL at 10:23

## 2018-11-17 RX ADMIN — LOSARTAN POTASSIUM 50 MG: 25 TABLET ORAL at 10:22

## 2018-11-17 ASSESSMENT — PAIN SCALES - GENERAL: PAINLEVEL_OUTOF10: 0

## 2018-11-18 PROCEDURE — 6360000002 HC RX W HCPCS: Performed by: PHYSICAL MEDICINE & REHABILITATION

## 2018-11-18 PROCEDURE — 94760 N-INVAS EAR/PLS OXIMETRY 1: CPT

## 2018-11-18 PROCEDURE — 6370000000 HC RX 637 (ALT 250 FOR IP): Performed by: PHYSICAL MEDICINE & REHABILITATION

## 2018-11-18 PROCEDURE — 1280000000 HC REHAB R&B

## 2018-11-18 PROCEDURE — 2700000000 HC OXYGEN THERAPY PER DAY

## 2018-11-18 PROCEDURE — 94640 AIRWAY INHALATION TREATMENT: CPT

## 2018-11-18 RX ADMIN — ATORVASTATIN CALCIUM 80 MG: 80 TABLET, FILM COATED ORAL at 21:15

## 2018-11-18 RX ADMIN — Medication 2 PUFF: at 21:15

## 2018-11-18 RX ADMIN — CIPROFLOXACIN HYDROCHLORIDE 500 MG: 500 TABLET, FILM COATED ORAL at 09:02

## 2018-11-18 RX ADMIN — MICONAZOLE NITRATE: 2 POWDER TOPICAL at 09:05

## 2018-11-18 RX ADMIN — MICONAZOLE NITRATE: 2 POWDER TOPICAL at 21:16

## 2018-11-18 RX ADMIN — ENOXAPARIN SODIUM 40 MG: 100 INJECTION SUBCUTANEOUS at 21:15

## 2018-11-18 RX ADMIN — TIOTROPIUM BROMIDE 18 MCG: 18 CAPSULE ORAL; RESPIRATORY (INHALATION) at 09:25

## 2018-11-18 RX ADMIN — FLUOXETINE HYDROCHLORIDE 20 MG: 20 CAPSULE ORAL at 09:04

## 2018-11-18 RX ADMIN — LOSARTAN POTASSIUM 50 MG: 25 TABLET ORAL at 09:04

## 2018-11-18 RX ADMIN — METHYLPHENIDATE HYDROCHLORIDE 10 MG: 10 TABLET ORAL at 09:04

## 2018-11-18 RX ADMIN — ASPIRIN 325 MG: 325 TABLET, DELAYED RELEASE ORAL at 09:04

## 2018-11-18 RX ADMIN — CIPROFLOXACIN HYDROCHLORIDE 500 MG: 500 TABLET, FILM COATED ORAL at 21:15

## 2018-11-18 RX ADMIN — METHYLPHENIDATE HYDROCHLORIDE 10 MG: 10 TABLET ORAL at 14:23

## 2018-11-18 RX ADMIN — Medication 2 PUFF: at 09:24

## 2018-11-18 RX ADMIN — FUROSEMIDE 20 MG: 20 TABLET ORAL at 09:04

## 2018-11-18 ASSESSMENT — PAIN SCALES - GENERAL
PAINLEVEL_OUTOF10: 0
PAINLEVEL_OUTOF10: 0

## 2018-11-18 NOTE — PLAN OF CARE
Problem: NUTRITION  Goal: Patient will utilize adaptive techniques to administer nutrition  Outcome: Ongoing  Pt able to drink 8 oz of nectar thick fluid by her self, jerrod well

## 2018-11-19 LAB
ANION GAP SERPL CALCULATED.3IONS-SCNC: 8 MMOL/L (ref 3–16)
BUN BLDV-MCNC: 15 MG/DL (ref 7–20)
CALCIUM SERPL-MCNC: 8.5 MG/DL (ref 8.3–10.6)
CHLORIDE BLD-SCNC: 108 MMOL/L (ref 99–110)
CO2: 28 MMOL/L (ref 21–32)
CREAT SERPL-MCNC: 0.6 MG/DL (ref 0.6–1.2)
GFR AFRICAN AMERICAN: >60
GFR NON-AFRICAN AMERICAN: >60
GLUCOSE BLD-MCNC: 152 MG/DL (ref 70–99)
HCT VFR BLD CALC: 42.2 % (ref 36–48)
HEMOGLOBIN: 14.2 G/DL (ref 12–16)
MCH RBC QN AUTO: 31.2 PG (ref 26–34)
MCHC RBC AUTO-ENTMCNC: 33.6 G/DL (ref 31–36)
MCV RBC AUTO: 93 FL (ref 80–100)
PDW BLD-RTO: 15.2 % (ref 12.4–15.4)
PLATELET # BLD: 233 K/UL (ref 135–450)
PMV BLD AUTO: 8.4 FL (ref 5–10.5)
POTASSIUM SERPL-SCNC: 4.3 MMOL/L (ref 3.5–5.1)
RBC # BLD: 4.53 M/UL (ref 4–5.2)
SODIUM BLD-SCNC: 144 MMOL/L (ref 136–145)
WBC # BLD: 8.8 K/UL (ref 4–11)

## 2018-11-19 PROCEDURE — 92507 TX SP LANG VOICE COMM INDIV: CPT

## 2018-11-19 PROCEDURE — 97530 THERAPEUTIC ACTIVITIES: CPT

## 2018-11-19 PROCEDURE — 36415 COLL VENOUS BLD VENIPUNCTURE: CPT

## 2018-11-19 PROCEDURE — 2700000000 HC OXYGEN THERAPY PER DAY

## 2018-11-19 PROCEDURE — 6370000000 HC RX 637 (ALT 250 FOR IP): Performed by: PHYSICAL MEDICINE & REHABILITATION

## 2018-11-19 PROCEDURE — 94760 N-INVAS EAR/PLS OXIMETRY 1: CPT

## 2018-11-19 PROCEDURE — 97112 NEUROMUSCULAR REEDUCATION: CPT

## 2018-11-19 PROCEDURE — 94640 AIRWAY INHALATION TREATMENT: CPT

## 2018-11-19 PROCEDURE — 85027 COMPLETE CBC AUTOMATED: CPT

## 2018-11-19 PROCEDURE — 97535 SELF CARE MNGMENT TRAINING: CPT

## 2018-11-19 PROCEDURE — 80048 BASIC METABOLIC PNL TOTAL CA: CPT

## 2018-11-19 PROCEDURE — 1280000000 HC REHAB R&B

## 2018-11-19 PROCEDURE — 6360000002 HC RX W HCPCS: Performed by: PHYSICAL MEDICINE & REHABILITATION

## 2018-11-19 RX ADMIN — CIPROFLOXACIN HYDROCHLORIDE 500 MG: 500 TABLET, FILM COATED ORAL at 10:50

## 2018-11-19 RX ADMIN — Medication 2 PUFF: at 07:49

## 2018-11-19 RX ADMIN — MICONAZOLE NITRATE: 2 POWDER TOPICAL at 23:01

## 2018-11-19 RX ADMIN — ATORVASTATIN CALCIUM 80 MG: 80 TABLET, FILM COATED ORAL at 22:54

## 2018-11-19 RX ADMIN — CIPROFLOXACIN HYDROCHLORIDE 500 MG: 500 TABLET, FILM COATED ORAL at 22:54

## 2018-11-19 RX ADMIN — ASPIRIN 325 MG: 325 TABLET, DELAYED RELEASE ORAL at 10:51

## 2018-11-19 RX ADMIN — METHYLPHENIDATE HYDROCHLORIDE 10 MG: 10 TABLET ORAL at 14:56

## 2018-11-19 RX ADMIN — FLUOXETINE HYDROCHLORIDE 20 MG: 20 CAPSULE ORAL at 10:51

## 2018-11-19 RX ADMIN — METHYLPHENIDATE HYDROCHLORIDE 10 MG: 10 TABLET ORAL at 10:50

## 2018-11-19 RX ADMIN — Medication 2 PUFF: at 21:21

## 2018-11-19 RX ADMIN — FUROSEMIDE 20 MG: 20 TABLET ORAL at 10:51

## 2018-11-19 RX ADMIN — TIOTROPIUM BROMIDE 18 MCG: 18 CAPSULE ORAL; RESPIRATORY (INHALATION) at 07:49

## 2018-11-19 RX ADMIN — MICONAZOLE NITRATE: 2 POWDER TOPICAL at 11:02

## 2018-11-19 RX ADMIN — LOSARTAN POTASSIUM 50 MG: 25 TABLET ORAL at 10:50

## 2018-11-19 RX ADMIN — TRAMADOL HYDROCHLORIDE 50 MG: 50 TABLET, FILM COATED ORAL at 10:48

## 2018-11-19 RX ADMIN — ENOXAPARIN SODIUM 40 MG: 100 INJECTION SUBCUTANEOUS at 22:54

## 2018-11-19 ASSESSMENT — PAIN SCALES - GENERAL: PAINLEVEL_OUTOF10: 8

## 2018-11-19 NOTE — PROGRESS NOTES
1: Transfer for ADL completion w/ Max A---maxA of 3, pt fatigued and attempting to rest L forearm and elbow on steady  Short term goal 2: UB dressing w/ Max A -- dependent due to decreased balance seated EOB  Short term goal 3: Toileting w/ Max A---depenedent for wilner care in steady  Short term goal 4: Bathing w/ Max A-- dependent for bathing this date due to decreased balance seated EOB  Short term goal 5: LB dressing w/ Max A and AE as needed -- dependent this date due to fatigue, dressing supine in bed  Long term goals  Time Frame for Long term goals : 3 weeks  Long term goal 1: Transfer for ADL completion w/ Mod A  Long term goal 2: UB dressing w/ Mod A  Long term goal 3: Toileting w/ Mod A  Long term goal 4: Bathing w/ Mod A  Long term goal 5: LB dressing w/ Mod A and AE as needed   Patient Goals   Patient goals : Pt unable to state goals        Therapy Time   Individual Concurrent Group Co-treatment   Time In       0915   Time Out       1000   Minutes       45        Timed Code Treatment Minutes:  45 min     Second Session Therapy Time:   Individual Concurrent Group Co-treatment   Time In    3559   Time Out    1315   Minutes    30     Timed Code Treatment Minutes:  45 + 30     Total Treatment Minutes:  75 min       Meagan Short, OT   Meagan Short OTR/[place], [place] #[id number]

## 2018-11-19 NOTE — PROGRESS NOTES
check for accuracy) . Pt will repeat vowels,and CV bilabial speech sounds with 70% accuracy mod-max cues. Goal not targeted this session. Goal not targeted this session. Pt will complete one-step commands with 70% acc, with < max cues. 1-step commands- 8/9 (89%) max verbal/ visual cues (following model of SLP), pt completed x 1 command Independently  (smile)   Goal not targeted this session. Other areas targeted:     Education:   Unable to demonstrate comprehension of basic education provided throughout session. Unable to demonstrate comprehension of basic education provided throughout session. Safety Devices: [x] Call light within reach  [x] Chair alarm activated  [] Bed alarm activated  [] Other:      [x] Call light within reach  [x] Chair alarm activated  [] Bed alarm activated  [x] Other: Left in presence of RN. RN administering pain medication. Assessment:   Speech Therapy Diagnosis  Cognitive Diagnosis: OSIRIS due to severe aphasia, will continue to assess as able. Aphasia Diagnosis: Severe expressive non-fluent aphasia and severe receptive aphasia   Communication Diagnosis: Severe due to global aphasia     Current Diet Order: DIET DYSPHAGIA III Advanced Diet, Egan Thick; No Drinking Straw (Allow thin water between meals)    Recommended Form of Meds: Whole in puree *ASSIST FEED due to impulsivity*.   Compensatory Swallowing Strategies: Small bites/sips, Eat/Feed slowly, Check for pocketing of food on the Right, External pacing, Remain upright for 30-45 minutes after meals, Upright as possible for all oral intake (ORAL CARE following meals)     Plan:     Frequency:  5days/week   60 minutes/day     Discharge Recommendations:   Barriers: severe communication deficits   Discharge Recommendations:  [] Home independently  [] Home with assistance []  24 hour supervision  [x] SNF [] Other:  Continued SLP Treatment:  [x] Yes [] No [] TBD based on progress while on ARU [] Vital Stim indicated [] Other:   Estimated discharge date: 11/23/18     Type of Total Treatment Minutes   Session 1   Session 2   Time In 0845 1015   Time Out 0915 1045   Timed Code Minutes  0 0   Individual Treatment Minutes  30 30   Co-Treatment Minutes      Group Treatment Minutes      Concurrent Treatment Minutes        TOTAL DAILY MINUTES:  60    Electronically Signed by     Bong RUIZ CCC-SLP #[id number] 11/19/2018 7:37 AM  Speech-Language Pathologist

## 2018-11-19 NOTE — PATIENT CARE CONFERENCE
[facility]  Inpatient Rehabilitation  Weekly Team Conference Note    Patient Name: Irina Garner        MRN: [id number]    : 1940  (70 y.o.)  Gender: female   Referring Practitioner: Dr Maryann Nunez  Diagnosis: CVA    The team conference for this patient was held on 18 at 11:00am by:  Shreya Rogers MD    CASE MANAGEMENT:  Assessment: Patient lives at home with  in an one story house. Patient has a walker, wheelchair, and canes at home already. Patient was independent with housekeeping, cooking, and cleaning prior. PSYCHOLOGY:  Assessment:     PHYSICAL THERAPY:  Bed Mobility:   Scooting: Dependent/Total    Transfers:  Sit to Stand: Dependent/Total (maxA of 1 with stedy)  Stand to sit: Dependent/Total (maxA of 1 with stedy)  Bed to Chair: Dependent/Total (Max A of 2 with [facility] )  Stand Pivot Transfers: Dependent/Total (maxA of 2 with stedy)                FIMS:  Bed, Chair, Wheel Chair: 1 - Requires >75% assitance to transfer  Walk: 2 - Maximal Assistance Requires up to [address] requires assistance of one person to walk between  feet (Patient performs 25-49% of locomotion effort or goes between  feet)  Distance Walked: 0  Wheel Chair: 2 - Maximal Assistance Requires up to [address] requires assistance of one person to operate wheelchair between  feet  Distance Traveled in Wheel Chair: 126'  Stairs: 0 - Activity Does not Occur ( 0 only for the admission assessment)    PT Equipment Recommendations  Equipment Needed: Yes  Other: TBD with progress    Assessment: Pt continues to require increased assistance in standing for upright posture and weight shifting to the L, however able to tolerate increased time in standing.       SPEECH THERAPY:  Diet Level: Dysphagia III (advanced) Diet, Nectar thick liquids, Meds whole in puree    FIMS:  Comprehension: 3 - Patient understands basic needs 50-74% of the time  Expression: 2 - Expresses
Severe expressive non-fluent aphasia and severe receptive aphasia. Pt unable to indicate basic wants or needs and is not consistent with y/n questions utilizing any modality at this time (pointing to written response, head nod/ head shake, thumbs up/ thumbs down, verbalizing). Pt also presents with oral apraxia further contributing to communication difficulty. Pt with oropharyngeal dysphagia and is on a modified diet. Pt requires to be a total feed at this time due to impulsivity and anterior bolus loss out of R side. Continue per POC. OCCUPATIONAL THERAPY:  FIMS:  Eating: 3 - Unable to put food on utensil (RN placed food on spoon and pt brought to mouth)  Grooming: 3 - Able to perform 2-3 tasks (mod assist)  Bathin - Able to bathe 2 or less areas/total assist  Dressing-Upper: 1 - Requires assist with 4 tasks/total assist  Dressing-Lower: 1 - Total assist with lower body dressing  Toiletin - Total assist  Toilet Transfer: 0 - Did not occur  Tub Transfer: 0 - Activity does not occur  Shower Transfer: 0 - Activity does not occur         Assessment: 70 y.o. female presents with the above deficits which are impacting her occupational performance. Pt currently requires minimum of assist x2 for ADLs and transfers. NUTRITION:  Weight: 226 lb 3.2 oz (102.6 kg) / Body mass index is 41.37 kg/m². Diet Order: Dys 2, Nectar thick liquids  Supplements:NA    Pt is severely aphasic. SLP evaluated & placed pt on dys 2 diet with nectar thick liquids. PO intake % of meals so far. No significant wt changes per hx. No evidence of nutrition compromise @ this time. Please see nutrition note for details.       NURSING:  FIMS:  Bladder Level of Assistance: 1- requires 75%+ assistance for bladder management tasks, helper changes soiled linens, clothes, absorbant pads, helper cares for and empties chao catheter  Bladder Frequency of Accidents:  (0)  Bowel Level of Assistance: 1- requires 75%+ assistance for

## 2018-11-19 NOTE — PROGRESS NOTES
Madyson Mcdowell  [birth year]  [id number]    Chief Complaint: Acute ischemic stroke [facility])    Subjective:   No significant weekend events. Global aphasia. Labs reviewed. ROS: No CP, SOB, dyspnea    Objective:  Patient Vitals for the past 24 hrs:   BP Temp Temp src Pulse Resp SpO2 Weight   11/19/18 1046 114/77 97.2 °F (36.2 °C) Oral 101 16 92 % -   11/19/18 0754 - - - - 16 92 % -   11/19/18 0530 - - - - - - 250 lb 1.6 oz (113.4 kg)   11/18/18 2117 112/74 98.2 °F (36.8 °C) Oral 83 16 91 % -     Gen: No distress, pleasant. Resting in bed  HEENT: Normocephalic, atraumatic. CV: Regular rate and rhythm. No MRG  Resp: No respiratory distress. CTAB. 2L O2  Abd: Soft, nontender nondistended  Ext: No edema. Neuro: Alert, global aphasia, inconsistent command following, appropriately interactive. Spontaneously moving LUE/LLE. RUE/RLE 0/5 with the exception of occasional R toe/DF/PF and withdrawal to pain. Negative R mcdowell, <5 beats clonus on R, 1/4 MAS on RLE. Laboratory data: Available via EMR. Therapy progress:  PT  Position Activity Restriction  Other position/activity restrictions: Clary Ashby is a 70 y.o. female admitted to [facility] on 10/27 after being found on the floor unconscious in home. Per documentation, EMS noted incontinence and facial droop as well as gross right sided flaccidy, non-verbal but conscious. Has a history of TIA per , and is on plavix. 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 Pt given tPA at triage, transferred to [facility] for possible thrombectomy, unable to complete due to anatomy.   Objective     Sit to Stand: Dependent/Total (maxA of 1 with stedy)  Stand to sit: Dependent/Total (maxA of 1 with stedy)  Bed to Chair: Dependent/Total (Max A of 2 with [facility] )  Stand Pivot Transfers: Dependent/Total (maxA of 2 with stedy)     OT  PT Equipment Recommendations  Equipment Needed: Yes  Other: TBD with progress     Assessment        SLP  Current Diet : [facility] soft  Current Liquid Diet :

## 2018-11-19 NOTE — PROGRESS NOTES
upon arrival          Orientation     Cognition      Objective      Transfers  Sit to Stand: Dependent/Total (maxA of 1 with stedy)  Stand to sit: Dependent/Total (maxA of 1 with stedy)  Comment: ModA of 2 to stand at ballet bars, modA of 3 to remain standing with facilitation at chest for upright posture, RLE block, pt pushing to the R and requiring mod-maxA to shift weight to L. Pt sliding L hand down bar 3x but was very hesistant throughout. Ambulation  Ambulation?: No  Stairs/Curb  Stairs?: No     Balance  Posture: Poor  Sitting - Static: Fair  Sitting - Dynamic: Fair;-  Standing - Static: Poor  Standing - Dynamic: Poor  Comments: Pt requiring modA during sponge bath to lean forward when washing LEs             Comment: 1st session: Performed transfers as mentioned above. Seated manual stretching of RLE into hip adduction, hip IR, knee extension, ankle DF. Seated exercises: LLE LAQ 2x10, LLE marches 2x10 with facilitation at hip flexors, LLE ankle pumps 2x10. RLE LAQ 10x with maxA and facilitation at quads. Pt remained in chair with alarm on and all needs in reach. 2nd session: Upon arrival, pt's O2 was thrown onto the floor next to the wc. OT attempted to place O2 back on pt's face and pt was refusing. Pt's O2 sats at 84%. Pt continued to refuse to put O2 on. RN notified and came into room to persuade pt as well. After moderate encouragement from OT, PT, and RN, pt put O2 back on and O2 sats went to 90%. Attempted diagonal ball rolls forward and to the L to improve pts L weight shift, however pt was unable to complete exercise due to the ball being too much of a distraction and pt not following directions. Attempted bean bag reach to L side and pt able to lean forward and to the L with modA to reach for bean bags 8x. Pt was instructed to then toss bean bag into cart, however pt refusing to toss bean bag and instead placing them between her legs.  Attempted demonstration with pt still unable to follow

## 2018-11-20 PROCEDURE — 94760 N-INVAS EAR/PLS OXIMETRY 1: CPT

## 2018-11-20 PROCEDURE — 94640 AIRWAY INHALATION TREATMENT: CPT

## 2018-11-20 PROCEDURE — 97112 NEUROMUSCULAR REEDUCATION: CPT

## 2018-11-20 PROCEDURE — 97535 SELF CARE MNGMENT TRAINING: CPT

## 2018-11-20 PROCEDURE — 6360000002 HC RX W HCPCS: Performed by: PHYSICAL MEDICINE & REHABILITATION

## 2018-11-20 PROCEDURE — 97530 THERAPEUTIC ACTIVITIES: CPT

## 2018-11-20 PROCEDURE — 92526 ORAL FUNCTION THERAPY: CPT

## 2018-11-20 PROCEDURE — 1280000000 HC REHAB R&B

## 2018-11-20 PROCEDURE — 6370000000 HC RX 637 (ALT 250 FOR IP): Performed by: PHYSICAL MEDICINE & REHABILITATION

## 2018-11-20 PROCEDURE — 92507 TX SP LANG VOICE COMM INDIV: CPT

## 2018-11-20 RX ORDER — SODIUM CHLORIDE 0.9 % (FLUSH) 0.9 %
SYRINGE (ML) INJECTION
Status: DISPENSED
Start: 2018-11-20 | End: 2018-11-20

## 2018-11-20 RX ORDER — FLUOXETINE HYDROCHLORIDE 20 MG/1
40 CAPSULE ORAL DAILY
Status: DISCONTINUED | OUTPATIENT
Start: 2018-11-21 | End: 2018-11-23 | Stop reason: HOSPADM

## 2018-11-20 RX ADMIN — LOSARTAN POTASSIUM 50 MG: 25 TABLET ORAL at 08:05

## 2018-11-20 RX ADMIN — MICONAZOLE NITRATE: 2 POWDER TOPICAL at 20:51

## 2018-11-20 RX ADMIN — FLUOXETINE HYDROCHLORIDE 20 MG: 20 CAPSULE ORAL at 08:05

## 2018-11-20 RX ADMIN — MICONAZOLE NITRATE: 2 POWDER TOPICAL at 08:06

## 2018-11-20 RX ADMIN — FUROSEMIDE 20 MG: 20 TABLET ORAL at 08:05

## 2018-11-20 RX ADMIN — METHYLPHENIDATE HYDROCHLORIDE 10 MG: 10 TABLET ORAL at 15:06

## 2018-11-20 RX ADMIN — METHYLPHENIDATE HYDROCHLORIDE 10 MG: 10 TABLET ORAL at 08:05

## 2018-11-20 RX ADMIN — CIPROFLOXACIN HYDROCHLORIDE 500 MG: 500 TABLET, FILM COATED ORAL at 20:50

## 2018-11-20 RX ADMIN — TRAMADOL HYDROCHLORIDE 50 MG: 50 TABLET, FILM COATED ORAL at 20:50

## 2018-11-20 RX ADMIN — TIOTROPIUM BROMIDE 18 MCG: 18 CAPSULE ORAL; RESPIRATORY (INHALATION) at 05:39

## 2018-11-20 RX ADMIN — Medication 2 PUFF: at 05:39

## 2018-11-20 RX ADMIN — CIPROFLOXACIN HYDROCHLORIDE 500 MG: 500 TABLET, FILM COATED ORAL at 08:05

## 2018-11-20 RX ADMIN — ENOXAPARIN SODIUM 40 MG: 100 INJECTION SUBCUTANEOUS at 20:50

## 2018-11-20 RX ADMIN — ATORVASTATIN CALCIUM 80 MG: 80 TABLET, FILM COATED ORAL at 20:50

## 2018-11-20 RX ADMIN — ASPIRIN 325 MG: 325 TABLET, DELAYED RELEASE ORAL at 08:05

## 2018-11-20 RX ADMIN — TRAMADOL HYDROCHLORIDE 50 MG: 50 TABLET, FILM COATED ORAL at 08:08

## 2018-11-20 ASSESSMENT — PAIN SCALES - GENERAL
PAINLEVEL_OUTOF10: 2
PAINLEVEL_OUTOF10: 0
PAINLEVEL_OUTOF10: 6
PAINLEVEL_OUTOF10: 5

## 2018-11-20 ASSESSMENT — PAIN SCALES - WONG BAKER: WONGBAKER_NUMERICALRESPONSE: 4

## 2018-11-20 NOTE — PROGRESS NOTES
ACUTE REHAB UNIT  SPEECH/LANGUAGE PATHOLOGY      [x] Daily  [x] Weekly Care Conference Note  [] Discharge    Patient:Magy Leach     NGB:[birth year]  KQW:[id number]  Room #: DOA-6281/1861-04   Rehab Dx/Hx: Acute ischemic stroke [facility]) [I63.9]  Date of Admit: 11/1/2018      Precautions: falls  Home situation: Per chart review, pt lives at home with spouse. ST Dx: Severe expressive non-fluent aphasia and severe receptive aphasia, Oropharyngeal Dysphagia, Oral Apraxia     Daily Treatment Info:   Date: 11/20/2018   Tx session 1 Tx session 2   Pain Pt reported pain (pointing to written response), indicated 4-6 using faces rating scale. When provided with written options pt pointed to shoulder then pointed to all written choices. Unable to check for accuracy. RN notified, pain medications administered. Pt indicated pain via a written response. Provided with faces rating scale pt indicated 4-6 (consistently pointing to 4-6 each session). Provided with f/3 written choices pt indicated both leg and shoulder. Able to indicate R provided with written f/2. Subjective     Pt found sitting upright in recliner chair on 4 L of O2 via NC at 93% prior to po. Pt attempting to interact and becoming tearful during session, did not appear to be in pain but appeared frustrated due to expressive aphasia. Pt found slightly reclined back in chair. Pt alert and pleasant throughout session. Did not appear to be in pain throughout. Weekly Update: Pt making minimal progress towards expressive/ receptive goals. Improvement with following 1-step commands with visual model, inconsistent y/n persists, pt unable to point (accurately), unable to produce thumbs up/ thumbs down or produce consistent head nods/ head shakes. No automatic speech/ phrases noted, however, pt is attempting to interact utilizing more gestures. Pt pointing to written binary options, unable to determine accuracy at all times.  Pt making progress towards dysphagia

## 2018-11-20 NOTE — PROGRESS NOTES
Occupational Therapy  Facility/Department: [facility] ACUTE REHAB UNIT  Daily Treatment Note  NAME: Joss Vivar  :   MRN: [id number]    Date of Service: 2018    Discharge Recommendations:  Subacute/Skilled Nursing Facility  OT Equipment Recommendations  Other: defer to d/c facility    Patient Diagnosis(es): There were no encounter diagnoses. has a past medical history of Acute CVA (cerebrovascular accident) ([facility] SHEY (acute kidney injury) ([facility] Arthritis; Cancer [facility]); COPD (chronic obstructive pulmonary disease) ([facility] GERD (gastroesophageal reflux disease); Hyperlipidemia; Hypertension; Impaired glucose tolerance; Pneumonia; PONV (postoperative nausea and vomiting); Pulmonary emboli ([facility] and Sleep apnea. has a past surgical history that includes Colonoscopy; Abdomen surgery; eye surgery; skin biopsy; Hysterectomy (); Cholecystectomy; joint replacement (); and Umbilical hernia repair (2018). Restrictions  Restrictions/Precautions  Restrictions/Precautions: Fall Risk, Swallowing - Thickened Liquids  Required Braces or Orthoses?: No  Position Activity Restriction  Other position/activity restrictions: Martha Pulliam is a 70 y.o. female admitted to [facility] on 10/27 after being found on the floor unconscious in home. Per documentation, EMS noted incontinence and facial droop as well as gross right sided flaccidy, non-verbal but conscious. Has a history of TIA per , and is on plavix. 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 Pt given tPA at triage, transferred to [facility] for possible thrombectomy, unable to complete due to anatomy. Subjective   General  Chart Reviewed: Yes  Patient assessed for rehabilitation services?: Yes  Response to previous treatment: Patient with no complaints from previous session  Family / Caregiver Present: No  Diagnosis: CVA  Subjective  Subjective: Pt seated in recliner upon entry, agreeable to OT session.    General Comment  Comments:    Pain Assessment  Patient Currently in Pain: Denies  Vital Signs  Patient Currently in Pain: Denies      Objective    ADL  Additional Comments: Pt declined ADLs at this time      Balance  Sitting Balance: Minimal assistance (L lateral lean )  Standing Balance: Dependent/Total (Max A x1 in stedy )  Standing Balance  Time: 1 min x3  Activity: transfers in stedy   Sit to stand: Dependent/Total  Stand to sit: Dependent/Total  Comment: Max A x1 in stedy     Transfers  Sit to stand: Dependent/Total  Stand to sit: Dependent/Total  Transfer Comments: Use of stedy, Max A x1      Comment: Pt transferred to mat table via stedy (documented above). Pt able to sit w/ upright posture in stedy w/ Mod A d/t R lateral lean. Pt completed lateral side prop on EOM for 20-30 seconds on wedge w/ Max A + Mod A to maintain position. Pt required motivation throughout to participate, pt becoming resistive and tearful at times. Pt completed forward weight shift in unsupported sitting w/ Max A x2 to maintain hand position + assist w/ weight shift. Pt then completed manual stretching w/ all joints in BLE. Pt noted to have increased discomfort w/ hip flexion. Second Session: Pt seated in recliner upon entry, agreeable to OT session. Pt found to be incontinent of a large amount of urine. Sit>Stand transfer via stedy=Max A. Sit>supine=Max A x2. Pt completed side to side rolling to assist w/ pericare (Min A to R side, Max A to L). Attempted to assist pt into bridging position but pt very resistive to positioning. Pt dependent for pericare and LB dressing this date. Pt sat EOB to don new shirt (pt diaphoretic throughout session but did not have a fever). Max A to assist pt to doff/don shirt w/ second person assist for sitting balance. Sit>supine=Dependent. Pt supine in bed at end of session, bed alarm on, needs within reach. Assessment   Performance deficits / Impairments: Decreased functional mobility ; Decreased endurance;Decreased fatigue, dressing supine in bed  Long term goals  Time Frame for Long term goals : 3 weeks  Long term goal 1: Transfer for ADL completion w/ Mod A  Long term goal 2: UB dressing w/ Mod A  Long term goal 3: Toileting w/ Mod A  Long term goal 4: Bathing w/ Mod A  Long term goal 5: LB dressing w/ Mod A and AE as needed   Patient Goals   Patient goals : Pt unable to state goals        Therapy Time   Individual Concurrent Group Co-treatment   Time In       830   Time Out       915   Minutes       45          Timed Code Treatment Minutes:  45 minutes       Second Session Therapy Time:   Individual Concurrent Group Co-treatment   Time In 8009      Time Out 1415      Minutes 30        Timed Code Treatment Minutes:  45 + 30     Total Treatment Minutes:   75 minutes    Nanda Membreno, CHRISTOPHER Membreno OTR/L, [place] #[id number]
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
## 2018-11-21 PROCEDURE — 94760 N-INVAS EAR/PLS OXIMETRY 1: CPT

## 2018-11-21 PROCEDURE — 6370000000 HC RX 637 (ALT 250 FOR IP): Performed by: PHYSICAL MEDICINE & REHABILITATION

## 2018-11-21 PROCEDURE — 92526 ORAL FUNCTION THERAPY: CPT

## 2018-11-21 PROCEDURE — 1280000000 HC REHAB R&B

## 2018-11-21 PROCEDURE — 97112 NEUROMUSCULAR REEDUCATION: CPT

## 2018-11-21 PROCEDURE — 2700000000 HC OXYGEN THERAPY PER DAY

## 2018-11-21 PROCEDURE — 92507 TX SP LANG VOICE COMM INDIV: CPT

## 2018-11-21 PROCEDURE — 97535 SELF CARE MNGMENT TRAINING: CPT

## 2018-11-21 PROCEDURE — 97530 THERAPEUTIC ACTIVITIES: CPT

## 2018-11-21 PROCEDURE — 94640 AIRWAY INHALATION TREATMENT: CPT

## 2018-11-21 PROCEDURE — 6360000002 HC RX W HCPCS: Performed by: PHYSICAL MEDICINE & REHABILITATION

## 2018-11-21 RX ORDER — SODIUM CHLORIDE 0.9 % (FLUSH) 0.9 %
SYRINGE (ML) INJECTION
Status: DISPENSED
Start: 2018-11-21 | End: 2018-11-22

## 2018-11-21 RX ADMIN — ATORVASTATIN CALCIUM 80 MG: 80 TABLET, FILM COATED ORAL at 22:15

## 2018-11-21 RX ADMIN — MICONAZOLE NITRATE: 2 POWDER TOPICAL at 22:14

## 2018-11-21 RX ADMIN — TIOTROPIUM BROMIDE 18 MCG: 18 CAPSULE ORAL; RESPIRATORY (INHALATION) at 05:39

## 2018-11-21 RX ADMIN — METHYLPHENIDATE HYDROCHLORIDE 10 MG: 10 TABLET ORAL at 15:07

## 2018-11-21 RX ADMIN — ENOXAPARIN SODIUM 40 MG: 100 INJECTION SUBCUTANEOUS at 22:15

## 2018-11-21 RX ADMIN — FUROSEMIDE 20 MG: 20 TABLET ORAL at 09:51

## 2018-11-21 RX ADMIN — CIPROFLOXACIN HYDROCHLORIDE 500 MG: 500 TABLET, FILM COATED ORAL at 22:15

## 2018-11-21 RX ADMIN — Medication 2 PUFF: at 21:08

## 2018-11-21 RX ADMIN — LOSARTAN POTASSIUM 50 MG: 25 TABLET ORAL at 09:51

## 2018-11-21 RX ADMIN — TRAMADOL HYDROCHLORIDE 50 MG: 50 TABLET, FILM COATED ORAL at 15:05

## 2018-11-21 RX ADMIN — ASPIRIN 325 MG: 325 TABLET, DELAYED RELEASE ORAL at 09:51

## 2018-11-21 RX ADMIN — FLUOXETINE 40 MG: 20 CAPSULE ORAL at 09:51

## 2018-11-21 RX ADMIN — Medication 2 PUFF: at 05:39

## 2018-11-21 RX ADMIN — CIPROFLOXACIN HYDROCHLORIDE 500 MG: 500 TABLET, FILM COATED ORAL at 09:54

## 2018-11-21 RX ADMIN — METHYLPHENIDATE HYDROCHLORIDE 10 MG: 10 TABLET ORAL at 09:51

## 2018-11-21 RX ADMIN — TRAMADOL HYDROCHLORIDE 50 MG: 50 TABLET, FILM COATED ORAL at 22:15

## 2018-11-21 ASSESSMENT — PAIN SCALES - GENERAL: PAINLEVEL_OUTOF10: 0

## 2018-11-21 NOTE — PROGRESS NOTES
thin liquids in isolation and in combination with other textures with no overt s/s of aspiration. Pt seemingly tolerated 8 oz of thin liquids via cup in isolation with no overt s/s of aspiration. Slight increase in SOB noted. Cannot rule out silent aspiration at bedside. Continue to recommend FEES at [facility] at d/c in order to further assess the pharyngeal phase. Conservative management due to increased O2 requirements with pt refusing to go down to CT scan to rule out PE. Thin liquids in isolation- pt tolerated 2/2 trials with no overt s/s of aspiration    Pt will answer basic yes/no questions using any modality with 70% accuracy mod cues. Answering basic/ personally relevant y/n questions following written f/2- 1/5 Independently, pt able to produce thumbs up model x 2 with visual cues    Goal not targeted this session. Patient will increase object/picture identification to > 62% accuracy, field of 2. Addend goal: and word identification to >71% acc f/2. Auditory Comprehension/ Word Identification (written f/2)- (Answering basic questions (i.e. Point to your first name)- 1/5 (pt always choosing top listed response or response listed first on R side)     Picture ID- f/2 (food pictures) 6/10 (60%)   Utilize [employer] Marilynn (Language Lite Comprehension)-     - Auditory Comprehension (pictured objects) total accuracy 42%      - 2 pics- 2/3 66%      - 3 pics- 1/4 25%   - Reading Comprehension total accuracy 28% (3 pictures, 1 word)       - 3 pics- 2/7 28%  - Listening and Reading Comprehension (3 words)- 5/7 (71%)     Pt will communicate basic wants/ needs utilizing multi-modalities with < mod cues. Provided with written f/3 pt pointed to (food). Suspect pt wanted drink. Provided with f/3 pt chose juice. Provided with written f/4 pt indicated wanting apple juice. Unable to assess for accuracy, but pt consumed 8 oz.  With written choices pt picked CT scan over not having CT scan (suspect reduced comprehension as pt has been actively refusing). When offered pt more water at the end of session pt put hand up as to say \"no\". Able to utilize gesture effectively. Pt will repeat vowels,and CV bilabial speech sounds with 70% accuracy mod-max cues. Goal not targeted this session. Goal not targeted this session. Pt will complete one-step commands with 70% acc, with < max cues. Goal not targeted this session. Goal not targeted this session. Other areas targeted:  Photo Touch Comparative Adjectives liana- f/2 pictures- 8/11 (73%), f/3 pictures 3/7 (43%) (pt occasionally tried to run finger across all images, required cueing to listing to instructions prior to selecting image)     Education:   Unable to demonstrate comprehension of basic education provided throughout session. Provided education to patient's son re: trying to establish a consistent/ accurate y/n system with use of gestures. Pt's son v/u, asked to reinforced (thumbs up for 'yes', waving hand across for 'no'). Pt's son in agreement of trying and showed return demonstration. Unable to demonstrate comprehension of basic education provided throughout session. Safety Devices: [x] Call light within reach  [x] Chair alarm activated  [] Bed alarm activated  [x] Other: Left in presence of son. [x] Call light within reach  [x] Chair alarm activated  [] Bed alarm activated  [] Other:        Assessment:   Speech Therapy Diagnosis  Cognitive Diagnosis: OSIRIS due to severe aphasia, will continue to assess as able. Aphasia Diagnosis: Severe expressive non-fluent aphasia and severe receptive aphasia   Communication Diagnosis: Severe due to global aphasia     Current Diet Order: DIET DYSPHAGIA III Advanced Diet, [place] Thick; No Drinking Straw (Allow thin water between meals)    Recommended Form of Meds: Whole in puree *ASSIST FEED due to impulsivity*.   Compensatory Swallowing Strategies: Small bites/sips, Eat/Feed slowly, Check for

## 2018-11-21 NOTE — PROGRESS NOTES
bed on arrival.  Pt utilizing gestures to decline CT or IV. Pain Screening  Patient Currently in Pain: Denies  Vital Signs  Patient Currently in Pain: Denies       Orientation     Cognition      Objective   Bed mobility  Rolling to Left: Maximum assistance  Rolling to Right: Maximum assistance  Supine to Sit: Maximum assistance  Sit to Supine: Dependent/Total (modA of 2)  Scooting: Dependent/Total  Transfers  Sit to Stand: Dependent/Total (with stedy, totalA of 1 to maxA of 2)  Stand to sit: Dependent/Total (with stedy, totalA of 1 to maxA of 2)  Bed to Chair: Dependent/Total (maxA of 2 with stedy)  Stand Pivot Transfers: Dependent/Total (maxA of 2 with stedy)  Ambulation  Ambulation?: No  Wheelchair Activities  Propulsion: No     Balance  Posture: Poor  Sitting - Static: Fair  Sitting - Dynamic: Fair;-  Standing - Static: Poor  Standing - Dynamic: Poor  Comments: Pt required CGA to static sit EOB and min-modA for dynamic sitting. While sitting on mat, pt demonstrating increased WB through R hip and required wedge for L weight shift. Pt uncomfortable in sitting and throwing out LUE for protective reaction. Comment: 1st session:  Performed ADL sitting EOB as documented above. Performed transfer to EOM with stedy. Pt required min A for sitting balance in stedy due to R lateral lean. Seated EOM, pt reached for cones in various planes, Max A for weight shift throughout. Pt unable to visually scan to R to follow cones but able to locate objects placed on the R when given verbal cues. Pt reached for 10 cones x3.   2nd session:  Pt supine in bed on arrival.  Again refusing IV or CT desite extensive education. Performed supine to sit transition with maxA. Performed sit to  stedy with totalA. Maintained standing in stedy X 4 minutes with totalA. Performed sit to  stedy X 3 trials with totalA. Pt unable to follow commands for reaching task while in stance.  Pt stood for another ~2 min in aphasia    Plan    Plan  Times per week: 60 minutes a day 5 days a week  Current Treatment Recommendations: Strengthening, ROM, Balance Training, Functional Mobility Training, Transfer Training, Endurance Training, Wheelchair Mobility Training, Gait Training, Stair training, Neuromuscular Re-education, Safety Education & Training, Patient/Caregiver Education & Training, Equipment Evaluation, Education, & procurement, Manual Therapy - Soft Tissue Mobilization  Safety Devices  Type of devices:  All fall risk precautions in place, Patient at risk for falls, Nurse notified, Call light within reach, Gait belt, Bed alarm in place, Left in bed  Restraints  Initially in place: No     Therapy Time   Individual Concurrent Group Co-treatment   Time In       0830   Time Out       0915   Minutes       39        Second Session Therapy Time:   Individual Concurrent Group Co-treatment   Time In       1315   Time Out       1345   Minutes       30     Timed Code Treatment Minutes:  45+30    Total Treatment Minutes:  1030 [facility], [address] [place] [address] [id number]

## 2018-11-21 NOTE — PROGRESS NOTES
session   General Comment  Comments:    Pain Assessment  Patient Currently in Pain: Denies  Vital Signs  Patient Currently in Pain: Denies   Orientation     Objective    ADL  UE Dressing: Dependent/Total (Max A to don shirt + second person assist for sitting balance)   LE Dressing: Dependent/Total (total assist for LB dressing. Pt completed rolling from L to R to assist )  Additional Comments: Pt completed LB dressing while supine in bed. Pt rolling from L to R to assist w/ dressing. Balance  Sitting Balance: Maximum assistance (varied assist from CGA-Max A during dynamic sitting tasks )  Standing Balance: Dependent/Total (Max A x1 in stedy)  Standing Balance  Time: 1 min x3  Activity: transfers in stedy   Sit to stand: Dependent/Total  Stand to sit: Dependent/Total    Bed mobility  Supine to Sit: Maximum assistance    Transfers  Sit to stand: Dependent/Total  Stand to sit: Dependent/Total  Transfer Comments: Use of stedy, Max A x1      Additional activity: Pt transported to mat table via stedy (Min A for sitting balance in stedy d/t R lateral lean). Pt seated on mat table to complete dynamic reaching task. Pt reached for cones in various planes, Max A for weight shift throughout. Pt unable to visually scan to R to follow cones but able to locate objects placed on the R when given verbal cues. Pt reached for 10 cones x3. Second Session: Pt supine in bed upon entry, agreeable to cotx. Sit>supine transfer=Max A. Pt required varied assist for sitting balance (Max A-CGA throughout). Pt sat EOB for ~5 min in unsupported sitting. Pt then completed sit>stand transfer at stedy w/ Max A. Pt stood for ~6 min in stedy (max A to maintain upright posture). Pt unable to follow commands for reaching task while in stance. Rest break incorporated. Pt stood for ~2 min in stedy (Max A to maintain upright posture). Pt's O2 monitored throughout, pt stayed in low 90s throughout on 4.5L O2. Sit>supine=Max A x2.  Pt supine in bed

## 2018-11-21 NOTE — PROGRESS NOTES
Patient incontinent of urine. Michelle care given and brief placed. Patient pointing to right arm, frowning. Medicated with Ultram 50mg. RUE elevated on pillow. Bed alarm on. Will monitor.

## 2018-11-22 LAB
ANION GAP SERPL CALCULATED.3IONS-SCNC: 9 MMOL/L (ref 3–16)
BUN BLDV-MCNC: 23 MG/DL (ref 7–20)
CALCIUM SERPL-MCNC: 8.5 MG/DL (ref 8.3–10.6)
CHLORIDE BLD-SCNC: 107 MMOL/L (ref 99–110)
CO2: 27 MMOL/L (ref 21–32)
CREAT SERPL-MCNC: 0.6 MG/DL (ref 0.6–1.2)
GFR AFRICAN AMERICAN: >60
GFR NON-AFRICAN AMERICAN: >60
GLUCOSE BLD-MCNC: 106 MG/DL (ref 70–99)
HCT VFR BLD CALC: 41.4 % (ref 36–48)
HEMOGLOBIN: 13.8 G/DL (ref 12–16)
MCH RBC QN AUTO: 30.8 PG (ref 26–34)
MCHC RBC AUTO-ENTMCNC: 33.4 G/DL (ref 31–36)
MCV RBC AUTO: 92.1 FL (ref 80–100)
PDW BLD-RTO: 15.2 % (ref 12.4–15.4)
PLATELET # BLD: 223 K/UL (ref 135–450)
PMV BLD AUTO: 8.6 FL (ref 5–10.5)
POTASSIUM SERPL-SCNC: 4.2 MMOL/L (ref 3.5–5.1)
RBC # BLD: 4.49 M/UL (ref 4–5.2)
SODIUM BLD-SCNC: 143 MMOL/L (ref 136–145)
WBC # BLD: 8.2 K/UL (ref 4–11)

## 2018-11-22 PROCEDURE — 6370000000 HC RX 637 (ALT 250 FOR IP): Performed by: PHYSICAL MEDICINE & REHABILITATION

## 2018-11-22 PROCEDURE — 6360000002 HC RX W HCPCS: Performed by: PHYSICAL MEDICINE & REHABILITATION

## 2018-11-22 PROCEDURE — 85027 COMPLETE CBC AUTOMATED: CPT

## 2018-11-22 PROCEDURE — 92526 ORAL FUNCTION THERAPY: CPT

## 2018-11-22 PROCEDURE — 94640 AIRWAY INHALATION TREATMENT: CPT

## 2018-11-22 PROCEDURE — 92507 TX SP LANG VOICE COMM INDIV: CPT

## 2018-11-22 PROCEDURE — 36415 COLL VENOUS BLD VENIPUNCTURE: CPT

## 2018-11-22 PROCEDURE — 94760 N-INVAS EAR/PLS OXIMETRY 1: CPT

## 2018-11-22 PROCEDURE — 1280000000 HC REHAB R&B

## 2018-11-22 PROCEDURE — 80048 BASIC METABOLIC PNL TOTAL CA: CPT

## 2018-11-22 PROCEDURE — 97535 SELF CARE MNGMENT TRAINING: CPT

## 2018-11-22 PROCEDURE — 97530 THERAPEUTIC ACTIVITIES: CPT

## 2018-11-22 RX ADMIN — FLUOXETINE 40 MG: 20 CAPSULE ORAL at 10:00

## 2018-11-22 RX ADMIN — ENOXAPARIN SODIUM 40 MG: 100 INJECTION SUBCUTANEOUS at 20:04

## 2018-11-22 RX ADMIN — MICONAZOLE NITRATE: 2 POWDER TOPICAL at 20:04

## 2018-11-22 RX ADMIN — ATORVASTATIN CALCIUM 80 MG: 80 TABLET, FILM COATED ORAL at 20:03

## 2018-11-22 RX ADMIN — CIPROFLOXACIN HYDROCHLORIDE 500 MG: 500 TABLET, FILM COATED ORAL at 10:00

## 2018-11-22 RX ADMIN — FUROSEMIDE 20 MG: 20 TABLET ORAL at 10:00

## 2018-11-22 RX ADMIN — ASPIRIN 325 MG: 325 TABLET, DELAYED RELEASE ORAL at 10:00

## 2018-11-22 RX ADMIN — Medication 2 PUFF: at 20:34

## 2018-11-22 RX ADMIN — CIPROFLOXACIN HYDROCHLORIDE 500 MG: 500 TABLET, FILM COATED ORAL at 20:03

## 2018-11-22 RX ADMIN — METHYLPHENIDATE HYDROCHLORIDE 10 MG: 10 TABLET ORAL at 10:00

## 2018-11-22 RX ADMIN — MICONAZOLE NITRATE: 2 POWDER TOPICAL at 10:00

## 2018-11-22 RX ADMIN — LOSARTAN POTASSIUM 50 MG: 25 TABLET ORAL at 09:59

## 2018-11-22 ASSESSMENT — PAIN SCALES - GENERAL: PAINLEVEL_OUTOF10: 0

## 2018-11-22 NOTE — PROGRESS NOTES
say \"no\". Able to utilize gesture effectively. Goal NOT MET. Pt demonstrates increased use of gestures and pt is attempting to interact more; however, she does not consistently utilize multi-modalities to successfully communicate wants/needs. Pt will repeat vowels,and CV bilabial speech sounds with 70% accuracy mod-max cues. /a/: 4/5  \"o\": 0/5; pt adds /w/ and says \"whoa\"  /i/: 0/5 Bilabial sounds in isolation:  -/m/: 60% (3/5) with multi-sensory cues  -/b/: 30% (3/10) with multisensory cues  -with production of /b/, pt able to imitate closed lip posture but had difficulty with voicing and opening oral cavity to produce sound. Attempted to use tactile cues of hand to larynx on SLP and pt to feel vibration, but this was grossly unsuccessful. Vowel:  /a/: 2/2 trials with increased time/demonstration    GOAL NOT MET     Pt will complete one-step commands with 70% acc, with < max cues. Goal not targeted this session. Basic one-step directions;  -mixed in context of activity and without cueing  -20% (2/10), increasing to 70% given repetitions, gestures, and in context  -improved accuracy of basic 1-step directions in context of an activity vs structured directions without cues. GOAL NOT MET     Other areas targeted: N/A [employer]omat[employer]:  -counting 1-10: 0%; pt attempting to round lips for \"one\" but did not verbalize    -\"Row Your Boat\" song--pt able to say \"row\" approximation x2; noted attempting to hum with semi-appropriate tune/joe for this song. SLP attempted to pair movement (rocking forward and back) with song    -\"Happy Birthday\" song--pt able to produce \"happy\" x1; pt again noted to attempt to hum with semi-appropriate tune/joe; produced /b/ for 'birthday' x1 (rest of word was unintelligible). SLP attempted to pair movement (tapping hands on legs/table) while singing     Education:   Unable to demonstrate comprehension of basic education provided throughout session.     Unable to demonstrate comprehension of basic education provided throughout session. Safety Devices: [x] Call light within reach  [] Chair alarm activated  [x] Bed alarm activated  [x] Other: PT/OT in room to begin session   [x] Call light within reach  [x] Chair alarm activated  [] Bed alarm activated  [] Other:        Assessment:   Speech Therapy Diagnosis  Cognitive Diagnosis: OSIRIS due to severe aphasia, will continue to assess as able. Aphasia Diagnosis: Severe expressive non-fluent aphasia and severe receptive aphasia   Communication Diagnosis: Severe due to global aphasia     Current Diet Order: DIET DYSPHAGIA III Advanced Diet, Auberry Thick; No Drinking Straw (Allow thin water between meals)    Recommended Form of Meds: Whole in puree *ASSIST FEED due to impulsivity*.   Compensatory Swallowing Strategies: Small bites/sips, Eat/Feed slowly, Check for pocketing of food on the Right, External pacing, Remain upright for 30-45 minutes after meals, Upright as possible for all oral intake (ORAL CARE following meals)     Plan:     Frequency:  5days/week   60 minutes/day     Discharge Recommendations:   Barriers: severe communication deficits   Discharge Recommendations:  [] Home independently  [] Home with assistance []  24 hour supervision  [x] SNF [] Other:  Continued SLP Treatment:  [x] Yes [] No [] TBD based on progress while on ARU [] Vital Stim indicated [] Other:   Estimated discharge date: 11/23/18     Type of Total Treatment Minutes   Session 1   Session 2   Time In 0745 0915   Time Out 0815 0945   Timed Code Minutes  0 0   Individual Treatment Minutes  30 30   Co-Treatment Minutes      Group Treatment Minutes      Concurrent Treatment Minutes        TOTAL DAILY MINUTES:  60    Electronically Signed by     Erwin Sinclair M.S. [id number] [facility]  Speech-Language Pathologist

## 2018-11-22 NOTE — PROGRESS NOTES
Occupational Therapy  Facility/Department: [facility] ACUTE REHAB UNIT  Daily Treatment Note/ Discharge  NAME: Tamika Paige  :   MRN: [id number]    Date of Service: 2018    Discharge Recommendations:  Subacute/Skilled Nursing Facility  OT Equipment Recommendations  Other: defer to d/c facility    Patient Diagnosis(es): There were no encounter diagnoses. has a past medical history of Acute CVA (cerebrovascular accident) ([facility] SHEY (acute kidney injury) ([facility] Arthritis; Cancer [facility]); COPD (chronic obstructive pulmonary disease) ([facility] GERD (gastroesophageal reflux disease); Hyperlipidemia; Hypertension; Impaired glucose tolerance; Pneumonia; PONV (postoperative nausea and vomiting); Pulmonary emboli ([facility] and Sleep apnea. has a past surgical history that includes Colonoscopy; Abdomen surgery; eye surgery; skin biopsy; Hysterectomy (); Cholecystectomy; joint replacement (); and Umbilical hernia repair (2018). Restrictions  Restrictions/Precautions  Restrictions/Precautions: Fall Risk, Swallowing - Thickened Liquids  Required Braces or Orthoses?: No  Position Activity Restriction  Other position/activity restrictions: Slime Hercules is a 70 y.o. female admitted to [facility] on 10/27 after being found on the floor unconscious in home. Per documentation, EMS noted incontinence and facial droop as well as gross right sided flaccidy, non-verbal but conscious. Has a history of TIA per , and is on plavix. 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 Pt given tPA at triage, transferred to [facility] for possible thrombectomy, unable to complete due to anatomy.   Subjective   General  Chart Reviewed: Yes  Patient assessed for rehabilitation services?: Yes  Response to previous treatment: Patient with no complaints from previous session  Family / Caregiver Present: Yes (son present during session )  Diagnosis: CVA  Subjective  Subjective: Pt supine in bed upon entry, agreeable to OT session

## 2018-11-23 VITALS
BODY MASS INDEX: 46.67 KG/M2 | TEMPERATURE: 98 F | HEART RATE: 99 BPM | WEIGHT: 253.6 LBS | OXYGEN SATURATION: 93 % | HEIGHT: 62 IN | RESPIRATION RATE: 19 BRPM | DIASTOLIC BLOOD PRESSURE: 99 MMHG | SYSTOLIC BLOOD PRESSURE: 164 MMHG

## 2018-11-23 PROCEDURE — 2700000000 HC OXYGEN THERAPY PER DAY

## 2018-11-23 PROCEDURE — 6370000000 HC RX 637 (ALT 250 FOR IP): Performed by: PHYSICAL MEDICINE & REHABILITATION

## 2018-11-23 PROCEDURE — 94760 N-INVAS EAR/PLS OXIMETRY 1: CPT

## 2018-11-23 PROCEDURE — 94640 AIRWAY INHALATION TREATMENT: CPT

## 2018-11-23 RX ORDER — FUROSEMIDE 20 MG/1
20 TABLET ORAL DAILY
Qty: 60 TABLET | Refills: 3
Start: 2018-11-24

## 2018-11-23 RX ORDER — LORAZEPAM 0.5 MG/1
0.5 TABLET ORAL ONCE
Status: COMPLETED | OUTPATIENT
Start: 2018-11-23 | End: 2018-11-23

## 2018-11-23 RX ORDER — FLUOXETINE HYDROCHLORIDE 40 MG/1
40 CAPSULE ORAL DAILY
Qty: 30 CAPSULE | Refills: 3
Start: 2018-11-24

## 2018-11-23 RX ORDER — ATORVASTATIN CALCIUM 80 MG/1
80 TABLET, FILM COATED ORAL NIGHTLY
Qty: 30 TABLET | Refills: 3
Start: 2018-11-23

## 2018-11-23 RX ORDER — METHYLPHENIDATE HYDROCHLORIDE 10 MG/1
10 TABLET ORAL 2 TIMES DAILY
Qty: 10 TABLET | Refills: 0 | Status: SHIPPED | OUTPATIENT
Start: 2018-11-23 | End: 2018-12-23

## 2018-11-23 RX ADMIN — Medication 2 PUFF: at 05:52

## 2018-11-23 RX ADMIN — TRAMADOL HYDROCHLORIDE 50 MG: 50 TABLET, FILM COATED ORAL at 13:49

## 2018-11-23 RX ADMIN — MICONAZOLE NITRATE: 2 POWDER TOPICAL at 08:33

## 2018-11-23 RX ADMIN — ASPIRIN 325 MG: 325 TABLET, DELAYED RELEASE ORAL at 08:31

## 2018-11-23 RX ADMIN — LOSARTAN POTASSIUM 50 MG: 25 TABLET ORAL at 08:31

## 2018-11-23 RX ADMIN — FUROSEMIDE 20 MG: 20 TABLET ORAL at 08:31

## 2018-11-23 RX ADMIN — FLUOXETINE 40 MG: 20 CAPSULE ORAL at 08:31

## 2018-11-23 RX ADMIN — TIZANIDINE 2 MG: 4 TABLET ORAL at 13:50

## 2018-11-23 RX ADMIN — METHYLPHENIDATE HYDROCHLORIDE 10 MG: 10 TABLET ORAL at 08:31

## 2018-11-23 RX ADMIN — TIOTROPIUM BROMIDE 18 MCG: 18 CAPSULE ORAL; RESPIRATORY (INHALATION) at 05:54

## 2018-11-23 RX ADMIN — LORAZEPAM 0.5 MG: 0.5 TABLET ORAL at 13:48

## 2018-11-23 RX ADMIN — METHYLPHENIDATE HYDROCHLORIDE 10 MG: 10 TABLET ORAL at 13:32

## 2018-11-23 ASSESSMENT — PAIN DESCRIPTION - FREQUENCY: FREQUENCY: INTERMITTENT

## 2018-11-23 ASSESSMENT — PAIN DESCRIPTION - ORIENTATION: ORIENTATION: LEFT

## 2018-11-23 ASSESSMENT — PAIN SCALES - GENERAL
PAINLEVEL_OUTOF10: 0
PAINLEVEL_OUTOF10: 5

## 2018-11-23 ASSESSMENT — PAIN DESCRIPTION - LOCATION: LOCATION: FLANK

## 2018-11-23 ASSESSMENT — PAIN DESCRIPTION - DESCRIPTORS: DESCRIPTORS: DISCOMFORT

## 2018-11-23 NOTE — PROGRESS NOTES
Patient noted with increased s/s of anxiety r/t leaving facility and going to another today. Attempts made to calm pt but unsuccessful at this time. Call placed to Dr. Aleena Dove and one time order for Ativan 0.5 mg po received. Will administer when available from pyxis.
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
## 2018-11-23 NOTE — CARE COORDINATION
Discharge Planning:  Spoke with Lynn Herbert at [facility] [place] and pt can be accepted to day. Chart printed. Called [facility] ambulance to arrange transport for 2 p.m. Called spouse to inform of transport time. HENS completed and two copies printed. Will continue to follow for support and discharge planning.  Kaitlin Ramos, MSW, LSW
Emergent/Urgent situations i.e. fire, crime, inclement weather or health crisis. :  Independent  Ability handle personal hygiene needs (bathing/dressing/grooming): Independent  Ability to manage medications: Independent  Ability to prepare food:  Needs Assistance  Ability to maintain home (clean home, laundry):  Needs Assistance  Ability to drive and/or has transportation:  Independent  Ability to do shopping:  Independent  Ability to manage finances: Independent  Is patient able to live independently?:  Yes  Hearing and Vision  Visual Impairment:  Reading glasses, Visual impairment (Glasses/contacts)  Hearing Impairment:  None  Care Transitions Interventions       Spoke with pt regarding this nurse's role and f/u after d/c, appeared to understand what I was saying anddonn shook her head yes to f/u calls after d/c. She was unable to speak to me regarding her personal life and d/c planning. Will continue to transition    Follow Up  Future Appointments  Date Time Provider Jakob Alejandra   1/29/2019 1:00 PM Jamia Oscar MD [facility]   4/12/2019 2:00 PM Savannah Orellana MD [facility]       Health Maintenance  There are no preventive care reminders to display for this patient.     Dariel Pastor RN

## 2025-04-30 NOTE — LETTER
4/30/2025      Bettye Parr  [address] [facility] [address]      Dear Bettye:     Below are the results from your recent labs. Your lab results were normal.    Resulted Orders   Thyroid Stimulating Hormone   Result Value Ref Range    TSH 1.393 0.350 - 5.000 mcUnits/mL   Free T4   Result Value Ref Range    T4, Free 1.0 0.8 - 1.5 ng/dL   Magnesium   Result Value Ref Range    Magnesium 2.1 1.7 - 2.4 mg/dL       Please feel free to contact our office if you have any questions.    Sincerely,    Office of Rehana Clemente, RAFAELA  [address] [facility]  [facility] [address] [phone]    [employer] is a powerful new Internet tool that is quick, convenient, and confidential.  With [employer], you can view your results faster; communicate on-line with your physician's office; schedule many types of appointments; and find helpful healthcare links.    Visit [email or web address] Room soon and often. Sign up, take a quick tour, view important information, and stay in touch with [employer]. We're There When You Need Us.     [address] [address] [place] [address]  Phone: [phone]  Fax: [phone]    Michael Camarillo MD        August 31, 2018     Adali Kaisernd, [address] [place] [address] [address]    Patient: Joel Parks  MR Number: [id number]  YOB: 1940  Date of Visit: 8/30/2018    Dear Dr. Kassy Infante CHF/Pulmonary Hypertension   Cardiac Evaluation      Joel Parks  YOB: 1940    Date of Visit:  8/30/18      Chief Complaint   Patient presents with   [facility] [place]     Denies chest pain. also c/o head sweating    Hypertension     (PHTN)    Hyperlipidemia    COPD    Edema     BLE; have improved    Shortness of Breath     with any exertion    Fatigue    Dizziness     if gets up too fast        History of Present Illness:  Joel Parks is a 70 y.o. female who presents from referral from Dr Mindy Abdul for consultation and management of bilateral leg swelling. She has a past medical history of COPD, PE, and EKATERINA. She also has colon polyps which she sees Dr Sheron Bender for. She is s/p hernia repair with Dr Chasidy Bueno in May. She was re hospitalized after surgery due to hypoxemia and tachycardia. PE was ruled out and Dr Janell Fowler determined her hypoxemia was a result of general anesthesia with underlying lung disease and collapsed lung. He recommended that she wear oxygen 24 hr a day instead of just at night which is what she had been previously doing. Since then, she has noticed bilateral lower extremity swelling over the last few months. She had a normal stress test in March of 2017. She had an echo in December of 2017 which showed an EF of 70%. She then had a ROBERT due to an echodense structure in the lower left atrium seen on transthoracic echo. This turned out to be a normal variant which gave the appearance of a right atrial mass. Today, she reports swelling in her bilateral lower extremities since May. Her PCP started her on Lasix 60 mg for the swelling. She was taken off Norvasc when her swelling started. She has been on Valsartan 80 mg since being diagnosed with a PE in 2013. Since the recall on Valsartan, she has been switched to Losartan 50 mg daily. She has noticed little veins popping out in her lower extremities. Her head sweats with any exertion. She denies any chest pain or pressure. No palpitations. Dizziness only when getting up too quickly. She goes to the [facility] three times a week and swims in the therapy pool. She has done this since her knee replacements two years ago. Recent Testing    ROBERT 12/27/17  Summary   Overall left ventricular function is normal.   Mild mitral regurgitation  Hakan [place] is an aneurysmal interatrial septum with no evidence of shunting.   A bubble study was performed and showed no evidence of shunting.   Prominent eustachian valve(normal variant) giving appearance of right   atrial mass    Echo 12/26/17  Technically limited study due to lung interface.      Normal left ventricle size. Left ventricular systolic function appears   hyperdynamic and is estimated at 70%.      There is reversal of E/A inflow velocities across the mitral valve. There   is an echodense structure in the lower left atrium only seen on 4 chamber   apical views which may be annular calcification but is not characteristic.   Clinical correlation for possible ROBERT should be considered.      Abnormal (paradoxical) septal motion is present.      There is mild-moderate concentric left ventricular hypertrophy.      Mild mitral regurgitation is present.      There is trivial tricuspid regurgitation with RVSP estimated at 36 mmHg.      The left atrium is severely dilated.     Stress Test 3/2/17      Summary    Normal myocardial perfusion.    Normal LV function.    Poor exercise tolerance.    Frequent PVC's and PAC's.            Allergies Allergen Reactions    Adhesive Tape      Blisters takes skin off, paper tape also causes rash    Amoxicillin-Pot Clavulanate Hives     Augmentin    Nickel Rash     Current Outpatient Prescriptions   Medication Sig Dispense Refill    losartan (COZAAR) 50 MG tablet Take 1 tablet by mouth daily 90 tablet 3    furosemide (LASIX) 20 MG tablet Take 3 tablets by mouth daily 90 tablet 3    SHINGRIX 50 MCG SUSR injection       tiZANidine (ZANAFLEX) 2 MG tablet Take 1 tablet by mouth every 8 hours as needed (muscle cramp) 30 tablet 1    lansoprazole (PREVACID) 30 MG delayed release capsule Take 30 mg by mouth daily as needed (heartburn)      magnesium 30 MG tablet Take 30 mg by mouth daily      guaiFENesin (MUCINEX) 600 MG extended release tablet Take 600 mg by mouth 2 times daily      tiotropium (SPIRIVA RESPIMAT) 1.25 MCG/ACT AERS inhaler Inhale 2 puffs into the lungs daily 3 Inhaler 3    fluticasone-salmeterol (ADVAIR DISKUS) 250-50 MCG/DOSE AEPB Inhale 1 puff into the lungs every 12 hours 3 each 3    atorvastatin (LIPITOR) 40 MG tablet Take 1 tablet by mouth daily 90 tablet 3    clopidogrel (PLAVIX) 75 MG tablet Take 1 tablet by mouth daily 90 tablet 3    aspirin EC 81 MG EC tablet Take 1 tablet by mouth daily Twice a week. 30 tablet 3    polyethylene glycol (MIRALAX) PACK packet Take 17G QD for 7 days and then every 48hrs (Patient taking differently: Take 17G QD for 7 days and then every 48hrs) 7 each 1    ondansetron (ZOFRAN) 4 MG tablet Take 1 tablet by mouth every 8 hours as needed for Nausea or Vomiting 12 tablet 0    traMADol (ULTRAM) 50 MG tablet Take 1 tablet by mouth every 6 hours as needed for Pain .  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 There's been no change in energy level, sleep pattern, or activity level. · Eyes: No visual changes or diplopia. No scleral icterus. · ENT: No Headaches, hearing loss or vertigo. No mouth sores or sore throat. · Cardiovascular: Reviewed in HPI  · Respiratory: No cough or wheezing, no sputum production. No hematemesis. · Gastrointestinal: No abdominal pain, appetite loss, blood in stools. No change in bowel or bladder habits. · Genitourinary: No dysuria, trouble voiding, or hematuria. · Musculoskeletal:  No gait disturbance, weakness or joint complaints. · Integumentary: No rash or pruritis. · Neurological: No headache, diplopia, change in muscle strength, numbness or tingling. No change in gait, balance, coordination, mood, affect, memory, mentation, behavior. · Psychiatric: No anxiety, no depression. · Endocrine: No malaise, fatigue or temperature intolerance. No excessive thirst, fluid intake, or urination. No tremor. · Hematologic/Lymphatic: No abnormal bruising or bleeding, blood clots or swollen lymph nodes. · Allergic/Immunologic: No nasal congestion or hives.     Physical Examination:    Vitals:    08/30/18 1409   BP: 110/64   Site: Left Arm   Position: Sitting   Cuff Size: Large Adult   Pulse: 82   Resp: 16   SpO2: 91% 5. Morbid obesity due to excess calories ([facility] [address] - going to [facility] three times a week    6. Chronic respiratory failure with hypoxia (HCC) - follows with Dr Anderson Beatty   7. Chronic diastolic heart failure ([facility] [address] - echo 12/2017 EF 70%,  Mild to moderate LVH. Start Spironolactone and decrease Lasix to 40 mg daily        Plan:  Start Spironolactone 25 mg daily  Decrease Lasix to 40 mg daily  Continue all other medications  Labs in 2 weeks-BMP and BNP  RTO in 1 month      QUALITY MEASURES  1. Tobacco Cessation Counseling: NA  2. Retake of BP if >140/90:  Na  3. Documentation to PCP/referring for new patient:  Sent to PCP at close of office visit  4. CAD patient on anti-platelet: Na  5. CAD patient on STATIN therapy:  Na  6. Patient with CHF and aFib on anticoagulation: NA        I appreciate the opportunity of cooperating in the care of this patient. Scribe's Attestation: This note was scribed in the presence of Dr. Jaime Limon MD by Geovanny Diallo RN. The scribe's documentation has been prepared under my direction and personally reviewed by me in its entirety. I confirm that the note above accurately reflects all work, treatment, procedures, and medical decision making performed by me. Roge Flores M.D., [facility]      If you have questions, please do not hesitate to call me. I look forward to following Rory Pollard along with you.     Sincerely,        Yoko Salazar MD